# Patient Record
Sex: FEMALE | Race: WHITE | NOT HISPANIC OR LATINO | Employment: OTHER | URBAN - METROPOLITAN AREA
[De-identification: names, ages, dates, MRNs, and addresses within clinical notes are randomized per-mention and may not be internally consistent; named-entity substitution may affect disease eponyms.]

---

## 2017-01-27 ENCOUNTER — TRANSCRIBE ORDERS (OUTPATIENT)
Dept: LAB | Facility: CLINIC | Age: 82
End: 2017-01-27

## 2017-01-27 ENCOUNTER — APPOINTMENT (OUTPATIENT)
Dept: LAB | Facility: CLINIC | Age: 82
End: 2017-01-27
Payer: COMMERCIAL

## 2017-01-27 DIAGNOSIS — D64.9 ANEMIA, UNSPECIFIED: ICD-10-CM

## 2017-01-27 DIAGNOSIS — Z79.899 ENCOUNTER FOR LONG-TERM (CURRENT) USE OF OTHER MEDICATIONS: ICD-10-CM

## 2017-01-27 DIAGNOSIS — R10.9 ABDOMINAL PAIN, UNSPECIFIED SITE: Primary | ICD-10-CM

## 2017-01-27 LAB
ALBUMIN SERPL BCP-MCNC: 3.5 G/DL (ref 3.5–5)
ALP SERPL-CCNC: 69 U/L (ref 46–116)
ALT SERPL W P-5'-P-CCNC: 20 U/L (ref 12–78)
ANION GAP SERPL CALCULATED.3IONS-SCNC: 6 MMOL/L (ref 4–13)
AST SERPL W P-5'-P-CCNC: 12 U/L (ref 5–45)
BASOPHILS # BLD AUTO: 0.02 THOUSANDS/ΜL (ref 0–0.1)
BASOPHILS NFR BLD AUTO: 0 % (ref 0–1)
BILIRUB SERPL-MCNC: 0.44 MG/DL (ref 0.2–1)
BUN SERPL-MCNC: 11 MG/DL (ref 5–25)
CALCIUM SERPL-MCNC: 8.9 MG/DL (ref 8.3–10.1)
CHLORIDE SERPL-SCNC: 106 MMOL/L (ref 100–108)
CO2 SERPL-SCNC: 29 MMOL/L (ref 21–32)
CREAT SERPL-MCNC: 0.8 MG/DL (ref 0.6–1.3)
EOSINOPHIL # BLD AUTO: 0.11 THOUSAND/ΜL (ref 0–0.61)
EOSINOPHIL NFR BLD AUTO: 2 % (ref 0–6)
ERYTHROCYTE [DISTWIDTH] IN BLOOD BY AUTOMATED COUNT: 16.5 % (ref 11.6–15.1)
EST. AVERAGE GLUCOSE BLD GHB EST-MCNC: 120 MG/DL
FERRITIN SERPL-MCNC: 12 NG/ML (ref 8–388)
FOLATE SERPL-MCNC: >20 NG/ML (ref 3.1–17.5)
GFR SERPL CREATININE-BSD FRML MDRD: >60 ML/MIN/1.73SQ M
GLUCOSE SERPL-MCNC: 95 MG/DL (ref 65–140)
HBA1C MFR BLD: 5.8 % (ref 4.2–6.3)
HCT VFR BLD AUTO: 39.6 % (ref 34.8–46.1)
HGB BLD-MCNC: 12.4 G/DL (ref 11.5–15.4)
IRON SATN MFR SERPL: 13 %
IRON SERPL-MCNC: 52 UG/DL (ref 50–170)
LYMPHOCYTES # BLD AUTO: 2.25 THOUSANDS/ΜL (ref 0.6–4.47)
LYMPHOCYTES NFR BLD AUTO: 33 % (ref 14–44)
MCH RBC QN AUTO: 27.4 PG (ref 26.8–34.3)
MCHC RBC AUTO-ENTMCNC: 31.3 G/DL (ref 31.4–37.4)
MCV RBC AUTO: 88 FL (ref 82–98)
MONOCYTES # BLD AUTO: 0.53 THOUSAND/ΜL (ref 0.17–1.22)
MONOCYTES NFR BLD AUTO: 8 % (ref 4–12)
NEUTROPHILS # BLD AUTO: 3.91 THOUSANDS/ΜL (ref 1.85–7.62)
NEUTS SEG NFR BLD AUTO: 57 % (ref 43–75)
NRBC BLD AUTO-RTO: 0 /100 WBCS
PLATELET # BLD AUTO: 227 THOUSANDS/UL (ref 149–390)
PMV BLD AUTO: 10.4 FL (ref 8.9–12.7)
POTASSIUM SERPL-SCNC: 3.3 MMOL/L (ref 3.5–5.3)
PROT SERPL-MCNC: 7.2 G/DL (ref 6.4–8.2)
RBC # BLD AUTO: 4.52 MILLION/UL (ref 3.81–5.12)
SODIUM SERPL-SCNC: 141 MMOL/L (ref 136–145)
TIBC SERPL-MCNC: 411 UG/DL (ref 250–450)
VIT B12 SERPL-MCNC: 695 PG/ML (ref 100–900)
WBC # BLD AUTO: 6.83 THOUSAND/UL (ref 4.31–10.16)

## 2017-01-27 PROCEDURE — 83036 HEMOGLOBIN GLYCOSYLATED A1C: CPT

## 2017-01-27 PROCEDURE — 83540 ASSAY OF IRON: CPT

## 2017-01-27 PROCEDURE — 82607 VITAMIN B-12: CPT

## 2017-01-27 PROCEDURE — 82746 ASSAY OF FOLIC ACID SERUM: CPT

## 2017-01-27 PROCEDURE — 80053 COMPREHEN METABOLIC PANEL: CPT

## 2017-01-27 PROCEDURE — 85025 COMPLETE CBC W/AUTO DIFF WBC: CPT

## 2017-01-27 PROCEDURE — 83550 IRON BINDING TEST: CPT

## 2017-01-27 PROCEDURE — 36415 COLL VENOUS BLD VENIPUNCTURE: CPT

## 2017-01-27 PROCEDURE — 82728 ASSAY OF FERRITIN: CPT

## 2017-02-23 ENCOUNTER — APPOINTMENT (OUTPATIENT)
Dept: LAB | Facility: CLINIC | Age: 82
End: 2017-02-23
Payer: COMMERCIAL

## 2017-02-23 ENCOUNTER — TRANSCRIBE ORDERS (OUTPATIENT)
Dept: LAB | Facility: CLINIC | Age: 82
End: 2017-02-23

## 2017-02-23 DIAGNOSIS — E87.6 HYPOPOTASSEMIA: Primary | ICD-10-CM

## 2017-02-23 LAB
ANION GAP SERPL CALCULATED.3IONS-SCNC: 5 MMOL/L (ref 4–13)
BUN SERPL-MCNC: 14 MG/DL (ref 5–25)
CALCIUM SERPL-MCNC: 9.1 MG/DL (ref 8.3–10.1)
CHLORIDE SERPL-SCNC: 107 MMOL/L (ref 100–108)
CO2 SERPL-SCNC: 30 MMOL/L (ref 21–32)
CREAT SERPL-MCNC: 0.9 MG/DL (ref 0.6–1.3)
GFR SERPL CREATININE-BSD FRML MDRD: 59 ML/MIN/1.73SQ M
GLUCOSE SERPL-MCNC: 96 MG/DL (ref 65–140)
POTASSIUM SERPL-SCNC: 4 MMOL/L (ref 3.5–5.3)
SODIUM SERPL-SCNC: 142 MMOL/L (ref 136–145)

## 2017-02-23 PROCEDURE — 80048 BASIC METABOLIC PNL TOTAL CA: CPT

## 2017-02-23 PROCEDURE — 36415 COLL VENOUS BLD VENIPUNCTURE: CPT

## 2017-12-04 ENCOUNTER — APPOINTMENT (EMERGENCY)
Dept: RADIOLOGY | Facility: HOSPITAL | Age: 82
End: 2017-12-04
Payer: COMMERCIAL

## 2017-12-04 ENCOUNTER — APPOINTMENT (EMERGENCY)
Dept: CT IMAGING | Facility: HOSPITAL | Age: 82
End: 2017-12-04
Payer: COMMERCIAL

## 2017-12-04 ENCOUNTER — HOSPITAL ENCOUNTER (EMERGENCY)
Facility: HOSPITAL | Age: 82
Discharge: HOME | End: 2017-12-04
Attending: EMERGENCY MEDICINE
Payer: COMMERCIAL

## 2017-12-04 VITALS
SYSTOLIC BLOOD PRESSURE: 186 MMHG | TEMPERATURE: 98.9 F | DIASTOLIC BLOOD PRESSURE: 77 MMHG | RESPIRATION RATE: 20 BRPM | OXYGEN SATURATION: 98 % | HEART RATE: 72 BPM | WEIGHT: 131.1 LBS

## 2017-12-04 DIAGNOSIS — J98.8 VIRAL RESPIRATORY INFECTION: Primary | ICD-10-CM

## 2017-12-04 DIAGNOSIS — R10.9 LEFT LATERAL ABDOMINAL PAIN: ICD-10-CM

## 2017-12-04 DIAGNOSIS — B97.89 VIRAL RESPIRATORY INFECTION: Primary | ICD-10-CM

## 2017-12-04 LAB
ALBUMIN SERPL BCP-MCNC: 3.3 G/DL (ref 3.5–5)
ALP SERPL-CCNC: 71 U/L (ref 46–116)
ALT SERPL W P-5'-P-CCNC: 26 U/L (ref 12–78)
ANION GAP SERPL CALCULATED.3IONS-SCNC: 9 MMOL/L (ref 4–13)
AST SERPL W P-5'-P-CCNC: 18 U/L (ref 5–45)
ATRIAL RATE: 66 BPM
BASOPHILS # BLD AUTO: 0.03 THOUSANDS/ΜL (ref 0–0.1)
BASOPHILS NFR BLD AUTO: 1 % (ref 0–1)
BILIRUB SERPL-MCNC: 0.7 MG/DL (ref 0.2–1)
BUN SERPL-MCNC: 16 MG/DL (ref 5–25)
CALCIUM SERPL-MCNC: 8.8 MG/DL (ref 8.3–10.1)
CHLORIDE SERPL-SCNC: 105 MMOL/L (ref 100–108)
CLARITY, POC: CLEAR
CO2 SERPL-SCNC: 28 MMOL/L (ref 21–32)
COLOR, POC: YELLOW
CREAT SERPL-MCNC: 0.97 MG/DL (ref 0.6–1.3)
EOSINOPHIL # BLD AUTO: 0.12 THOUSAND/ΜL (ref 0–0.61)
EOSINOPHIL NFR BLD AUTO: 2 % (ref 0–6)
ERYTHROCYTE [DISTWIDTH] IN BLOOD BY AUTOMATED COUNT: 13.1 % (ref 11.6–15.1)
EXT BILIRUBIN, UA: NORMAL
EXT BLOOD URINE: NORMAL
EXT GLUCOSE, UA: NORMAL
EXT KETONES: NORMAL
EXT NITRITE, UA: POSITIVE
EXT PH, UA: 6
EXT PROTEIN, UA: NORMAL
EXT SPECIFIC GRAVITY, UA: 1
EXT UROBILINOGEN: 0.2
GFR SERPL CREATININE-BSD FRML MDRD: 52 ML/MIN/1.73SQ M
GLUCOSE SERPL-MCNC: 95 MG/DL (ref 65–140)
HCT VFR BLD AUTO: 38.7 % (ref 34.8–46.1)
HGB BLD-MCNC: 12.5 G/DL (ref 11.5–15.4)
LIPASE SERPL-CCNC: 57 U/L (ref 73–393)
LYMPHOCYTES # BLD AUTO: 1.2 THOUSANDS/ΜL (ref 0.6–4.47)
LYMPHOCYTES NFR BLD AUTO: 18 % (ref 14–44)
MCH RBC QN AUTO: 30.4 PG (ref 26.8–34.3)
MCHC RBC AUTO-ENTMCNC: 32.3 G/DL (ref 31.4–37.4)
MCV RBC AUTO: 94 FL (ref 82–98)
MONOCYTES # BLD AUTO: 0.46 THOUSAND/ΜL (ref 0.17–1.22)
MONOCYTES NFR BLD AUTO: 7 % (ref 4–12)
NEUTROPHILS # BLD AUTO: 4.74 THOUSANDS/ΜL (ref 1.85–7.62)
NEUTS SEG NFR BLD AUTO: 72 % (ref 43–75)
P AXIS: 71 DEGREES
PLATELET # BLD AUTO: 135 THOUSANDS/UL (ref 149–390)
PMV BLD AUTO: 9.9 FL (ref 8.9–12.7)
POTASSIUM SERPL-SCNC: 3.7 MMOL/L (ref 3.5–5.3)
PR INTERVAL: 162 MS
PROT SERPL-MCNC: 6.7 G/DL (ref 6.4–8.2)
QRS AXIS: 7 DEGREES
QRSD INTERVAL: 74 MS
QT INTERVAL: 400 MS
QTC INTERVAL: 413 MS
RBC # BLD AUTO: 4.11 MILLION/UL (ref 3.81–5.12)
SODIUM SERPL-SCNC: 142 MMOL/L (ref 136–145)
T WAVE AXIS: 76 DEGREES
VENTRICULAR RATE: 64 BPM
WBC # BLD AUTO: 6.55 THOUSAND/UL (ref 4.31–10.16)
WBC # BLD EST: NORMAL 10*3/UL

## 2017-12-04 PROCEDURE — 93005 ELECTROCARDIOGRAM TRACING: CPT

## 2017-12-04 PROCEDURE — 71010 HB CHEST X-RAY 1 VIEW FRONTAL (PORTABLE): CPT

## 2017-12-04 PROCEDURE — 96361 HYDRATE IV INFUSION ADD-ON: CPT

## 2017-12-04 PROCEDURE — 99285 EMERGENCY DEPT VISIT HI MDM: CPT

## 2017-12-04 PROCEDURE — 74176 CT ABD & PELVIS W/O CONTRAST: CPT

## 2017-12-04 PROCEDURE — 83690 ASSAY OF LIPASE: CPT | Performed by: EMERGENCY MEDICINE

## 2017-12-04 PROCEDURE — 93005 ELECTROCARDIOGRAM TRACING: CPT | Performed by: EMERGENCY MEDICINE

## 2017-12-04 PROCEDURE — 96360 HYDRATION IV INFUSION INIT: CPT

## 2017-12-04 PROCEDURE — 81002 URINALYSIS NONAUTO W/O SCOPE: CPT | Performed by: EMERGENCY MEDICINE

## 2017-12-04 PROCEDURE — 85025 COMPLETE CBC W/AUTO DIFF WBC: CPT | Performed by: EMERGENCY MEDICINE

## 2017-12-04 PROCEDURE — 80053 COMPREHEN METABOLIC PANEL: CPT | Performed by: EMERGENCY MEDICINE

## 2017-12-04 PROCEDURE — 36415 COLL VENOUS BLD VENIPUNCTURE: CPT | Performed by: EMERGENCY MEDICINE

## 2017-12-04 RX ORDER — IRON POLYSACCHARIDE COMPLEX 150 MG
150 CAPSULE ORAL 2 TIMES DAILY
COMMUNITY
End: 2021-01-01

## 2017-12-04 RX ORDER — OXYCODONE HYDROCHLORIDE 5 MG/1
5 CAPSULE ORAL EVERY 6 HOURS PRN
Qty: 20 CAPSULE | Refills: 0 | Status: SHIPPED | OUTPATIENT
Start: 2017-12-04 | End: 2019-10-30

## 2017-12-04 RX ORDER — ALBUTEROL SULFATE 90 UG/1
2 AEROSOL, METERED RESPIRATORY (INHALATION) EVERY 6 HOURS PRN
COMMUNITY
End: 2020-08-26

## 2017-12-04 RX ORDER — GABAPENTIN 100 MG/1
100 CAPSULE ORAL 4 TIMES DAILY
COMMUNITY
End: 2020-06-07

## 2017-12-04 RX ORDER — OXYCODONE HYDROCHLORIDE 5 MG/1
5 TABLET ORAL ONCE
Status: COMPLETED | OUTPATIENT
Start: 2017-12-04 | End: 2017-12-04

## 2017-12-04 RX ADMIN — SODIUM CHLORIDE 500 ML: 0.9 INJECTION, SOLUTION INTRAVENOUS at 14:43

## 2017-12-04 RX ADMIN — OXYCODONE HYDROCHLORIDE 5 MG: 5 TABLET ORAL at 17:37

## 2017-12-04 NOTE — DISCHARGE INSTRUCTIONS
Diagnosis;   Viral respiratory infection // left mid to lower abdominal pain     - activity as tolerated    - diet as tolerated - need to keep well hydrated    - for the cold symptoms=- just supportive care -- keep hydrated- for cough can use over the counter mucinex- if it helps you- for nasal congestion over the counter afrin 2 sprays per nostril 2 times per day for 3 days    - please return to  The er for any increasing difficulty breathing    - for the left sided abdominal pain // - use over the counter tylenol 500 mg every 4 hrs -- can use the oxycodone as needed     - please return to  The er for any fevers-temp > 100 4/ any worsening/ intractable abdominal pain // any vomiting/ any bloody stools or any new/ worsening/concerning symptoms to you

## 2017-12-04 NOTE — ED NOTES
Pt awake and alert, no distress noted, no other questions upon d/c     April KYUNG Miles RN  12/04/17 1800

## 2017-12-04 NOTE — ED NOTES
Upon going in to discharge pt family and patient reported her pain coming back "severely"   Spoke with Dr Mohinder Vasquez who stated "I will be in to see her"     Bridgett Delacruz RN  12/04/17 0251

## 2017-12-04 NOTE — ED PROCEDURE NOTE
PROCEDURE  ECG 12 Lead Documentation  Date/Time: 12/4/2017 4:35 PM  Performed by: Luis Felipe Bess  Authorized by: Shivani VENCES     Indications / Diagnosis:  Mid abd pain   ECG reviewed by me, the ED Provider: yes    Patient location:  ED and bedside  Previous ECG:     Previous ECG:  Compared to current    Comparison ECG info:  9/20/16    Similarity:  No change  Interpretation:     Interpretation: non-specific    Rate:     ECG rate:  64    ECG rate assessment: normal    Rhythm:     Rhythm: sinus rhythm    Ectopy:     Ectopy: none    QRS:     QRS axis:  Normal    QRS intervals:  Normal  Conduction:     Conduction: normal    ST segments:     ST segments:  Normal  T waves:     T waves: flattening      Flattening:  AVL and V1  Q waves:     Q waves:  V1  Other findings:     Other findings: poor R wave progression and U wave    Comments:      No ecg signs of ischemia/ injury

## 2017-12-04 NOTE — ED PROVIDER NOTES
History  Chief Complaint   Patient presents with    Abdominal Pain     pt here for left side abd pain  hx of bowel loop for many years  afraid it may be the reason of her pain  c/o some diarrhea  onset yesterday  also has an URI  80 yr  wf- with 1 week of improving nasal cogngestion- productive cough -- no fever/ no cp/sob--  No ill cotnacts-  With 2 days of intermitetn sharp left sdied mid abd pain -- worse upon movements-- no injury - no rash in area-- no n/v/d- no gu/gyn comps-        History provided by:  Patient, relative and spouse   used: No        Prior to Admission Medications   Prescriptions Last Dose Informant Patient Reported? Taking? albuterol (PROVENTIL HFA,VENTOLIN HFA) 90 mcg/act inhaler  Self Yes Yes   Sig: Inhale 2 puffs every 6 (six) hours as needed for wheezing   colchicine (COLCRYS) 0 6 mg tablet  Self Yes Yes   Sig: Take 0 6 mg by mouth daily  cyanocobalamin (VITAMIN B-12) 1,000 mcg tablet  Self Yes Yes   Sig: Take 1,000 mcg by mouth daily  dexlansoprazole (DEXILANT) 60 MG capsule  Self Yes Yes   Sig: Take 60 mg by mouth daily  famotidine (PEPCID) 20 mg tablet  Self No Yes   Sig: Take 1 tablet by mouth 2 (two) times a day for 4 days  Patient taking differently: Take 20 mg by mouth daily     gabapentin (NEURONTIN) 100 mg capsule  Self Yes Yes   Sig: Take 100 mg by mouth 4 (four) times a day   iron polysaccharides (NIFEREX) 150 mg capsule  Self Yes Yes   Sig: Take 150 mg by mouth 2 (two) times a day   montelukast (SINGULAIR) 10 mg tablet 12/3/2017 at 1200 Self Yes Yes   Sig: Take 10 mg by mouth daily     oxyCODONE-acetaminophen (PERCOCET) 5-325 mg per tablet  Self Yes Yes   Sig: Take 1 tablet by mouth every 6 (six) hours as needed for moderate pain  pravastatin (PRAVACHOL) 10 mg tablet  Self Yes Yes   Sig: Take 10 mg by mouth daily        Facility-Administered Medications: None       Past Medical History:   Diagnosis Date    Asthma     Cancer ([facility] [address] )  Myocardial infarction        Past Surgical History:   Procedure Laterality Date    HYSTERECTOMY         History reviewed  No pertinent family history  I have reviewed and agree with the history as documented  Social History   Substance Use Topics    Smoking status: Never Smoker    Smokeless tobacco: Not on file    Alcohol use No        Review of Systems   Constitutional: Negative  HENT: Positive for congestion, postnasal drip, rhinorrhea and sore throat  Negative for dental problem, drooling, ear discharge, ear pain, facial swelling, hearing loss, mouth sores, nosebleeds, sinus pain, sinus pressure, sneezing, tinnitus, trouble swallowing and voice change  Eyes: Negative  Respiratory: Positive for cough  Negative for apnea, choking, chest tightness, shortness of breath, wheezing and stridor  Cardiovascular: Negative  Gastrointestinal: Positive for abdominal pain  Negative for abdominal distention, anal bleeding, blood in stool, constipation, diarrhea, nausea, rectal pain and vomiting  Endocrine: Negative  Musculoskeletal: Negative  Skin: Negative  Allergic/Immunologic: Negative  Neurological: Negative  Hematological: Negative  Psychiatric/Behavioral: Negative  Physical Exam  ED Triage Vitals [12/04/17 1200]   Temperature Pulse Respirations Blood Pressure SpO2   98 9 °F (37 2 °C) 75 20 163/69 95 %      Temp Source Heart Rate Source Patient Position - Orthostatic VS BP Location FiO2 (%)   Oral Monitor Sitting Left arm --      Pain Score       Worst Possible Pain           Orthostatic Vital Signs  Vitals:    12/04/17 1430 12/04/17 1512 12/04/17 1515 12/04/17 1621   BP:  127/80 127/80 157/71   Pulse: 68 65 66 65   Patient Position - Orthostatic VS:  Lying Lying Lying       Physical Exam   Constitutional: She is oriented to person, place, and time  No distress     avss-- pulse ox 98 % on ra- intepretation is normal- no intervention is normal- - no intervention   HENT: Head: Normocephalic and atraumatic  Right Ear: External ear normal    Left Ear: External ear normal    Mouth/Throat: Oropharynx is clear and moist  No oropharyngeal exudate  bilaterla ansal cognestion --  No bialterla amx/frtonal sinus tendenress/edema/ erythema   Eyes: Conjunctivae and EOM are normal  Pupils are equal, round, and reactive to light  Right eye exhibits no discharge  Left eye exhibits no discharge  No scleral icterus  Mm pink   Neck: Normal range of motion  Neck supple  No JVD present  No tracheal deviation present  No thyromegaly present  Cardiovascular: Normal rate and intact distal pulses  Exam reveals no gallop and no friction rub  Murmur heard  Pulmonary/Chest: Effort normal and breath sounds normal  No stridor  No respiratory distress  She has no wheezes  She has no rales  She exhibits no tenderness  Abdominal: Soft  Bowel sounds are normal  She exhibits no distension and no mass  There is tenderness  There is no rebound and no guarding  No hernia  No pulsatile abd mass/bruit--  No cva tendernjess-- mild llq  tenderness-- - no peritoneal signs-- no bilateral inguinal femotal hernia's    Musculoskeletal: She exhibits edema  She exhibits no tenderness or deformity  Trace ble pretibial edema- no assym/ erythema/ tenderness-- equal bilateral radial/dp pulses   Lymphadenopathy:     She has no cervical adenopathy  Neurological: She is alert and oriented to person, place, and time  No cranial nerve deficit or sensory deficit  She exhibits normal muscle tone  Skin: Capillary refill takes less than 2 seconds  No rash noted  She is not diaphoretic  No erythema  No pallor  Psychiatric: She has a normal mood and affect  Her behavior is normal  Judgment and thought content normal    Nursing note and vitals reviewed        ED Medications  Medications   sodium chloride 0 9 % bolus 500 mL (500 mL Intravenous New Bag 12/4/17 3517)       Diagnostic Studies  Results Reviewed     Procedure Component Value Units Date/Time    Comprehensive metabolic panel [[id number]]  (Abnormal) Collected:  12/04/17 1439    Lab Status:  Final result Specimen:  Blood from Arm, Right Updated:  12/04/17 1501     Sodium 142 mmol/L      Potassium 3 7 mmol/L      Chloride 105 mmol/L      CO2 28 mmol/L      Anion Gap 9 mmol/L      BUN 16 mg/dL      Creatinine 0 97 mg/dL      Glucose 95 mg/dL      Calcium 8 8 mg/dL      AST 18 U/L      ALT 26 U/L      Alkaline Phosphatase 71 U/L      Total Protein 6 7 g/dL      Albumin 3 3 (L) g/dL      Total Bilirubin 0 70 mg/dL      eGFR 52 ml/min/1 73sq m     Narrative:         National Kidney Disease Education Program recommendations are as follows:  GFR calculation is accurate only with a steady state creatinine  Chronic Kidney disease less than 60 ml/min/1 73 sq  meters  Kidney failure less than 15 ml/min/1 73 sq  meters      Lipase [[id number]]  (Abnormal) Collected:  12/04/17 1439    Lab Status:  Final result Specimen:  Blood from Arm, Right Updated:  12/04/17 1501     Lipase 57 (L) u/L     POCT urinalysis dipstick [[id number]]  (Normal) Resulted:  12/04/17 1456    Lab Status:  Final result Specimen:  Urine Updated:  12/04/17 1457     Color, UA yellow     Clarity, UA clear     EXT Glucose, UA neg     EXT Bilirubin, UA (Ref: Negative) neg     EXT Ketones, UA (Ref: Negative) small     EXT Spec Grav, UA 1 005     EXT Blood, UA (Ref: Negative) hemolyzed trace     EXT pH, UA 6 0     EXT Protein, UA (Ref: Negative) neg     EXT Urobilinogen, UA (Ref: 0 2- 1 0) 0 2     EXT Leukocytes, UA (Ref: Negative) trace     EXT Nitrite, UA (Ref: Negative) positive    CBC and differential [[id number]]  (Abnormal) Collected:  12/04/17 1439    Lab Status:  Final result Specimen:  Blood from Arm, Right Updated:  12/04/17 1446     WBC 6 55 Thousand/uL      RBC 4 11 Million/uL      Hemoglobin 12 5 g/dL      Hematocrit 38 7 %      MCV 94 fL      MCH 30 4 pg      MCHC 32 3 g/dL      RDW 13 1 %      MPV 9 9 fL Platelets 083 (L) Thousands/uL      Neutrophils Relative 72 %      Lymphocytes Relative 18 %      Monocytes Relative 7 %      Eosinophils Relative 2 %      Basophils Relative 1 %      Neutrophils Absolute 4 74 Thousands/µL      Lymphocytes Absolute 1 20 Thousands/µL      Monocytes Absolute 0 46 Thousand/µL      Eosinophils Absolute 0 12 Thousand/µL      Basophils Absolute 0 03 Thousands/µL                  XR chest 1 view portable   ED Interpretation by Diana Aguilera MD (12/04 1633)   No change from prior       Final Result by Shannan Howard DO (12/04 1613)      No active pulmonary disease  Workstation performed: [id number]         CT abdomen pelvis wo contrast   Final Result by Catarina Osuna MD (12/04 1535)      No acute inflammatory findings within the abdomen or pelvis  Workstation performed: [id number]                    Procedures  Procedures       Phone Contacts  ED Phone Contact    ED Course  ED Course as of Dec 04 1734   Mon Dec 04, 2017   1421 Er md note- pt offered pain medication  - refuses at present    1631 Cxr portable- no sign change from previous 9/20/16-  no change in mediastinum- no free/sq air/ no infiltrate/ ptx/ pulm edema/ plerual effusions    1645 - er- md note- pt currently denies any urinary comps karan l d/c - pt and family feel comfortable taking pt home    65 - er md note- upon pt d/c--  pt again c/o intermitent left sided sharp abd pain -- pt offered hops admit- pt refuses admit- asking for pain medication- has oxycodone  for pain at home- but only has 1 pill left                                [facility]  CritCare Time    Disposition  Final diagnoses:   None     ED Disposition     None      Follow-up Information    None       Patient's Medications   Discharge Prescriptions    No medications on file     No discharge procedures on file      ED Provider  Electronically Signed by           Diana Aguilera MD  12/04/17 0566

## 2017-12-13 ENCOUNTER — APPOINTMENT (EMERGENCY)
Dept: RADIOLOGY | Facility: HOSPITAL | Age: 82
DRG: 690 | End: 2017-12-13
Payer: COMMERCIAL

## 2017-12-13 ENCOUNTER — HOSPITAL ENCOUNTER (INPATIENT)
Facility: HOSPITAL | Age: 82
LOS: 3 days | Discharge: HOME | DRG: 690 | End: 2017-12-16
Attending: EMERGENCY MEDICINE | Admitting: FAMILY MEDICINE
Payer: COMMERCIAL

## 2017-12-13 DIAGNOSIS — R53.1 GENERALIZED WEAKNESS: ICD-10-CM

## 2017-12-13 DIAGNOSIS — N39.0 UTI (URINARY TRACT INFECTION): Primary | ICD-10-CM

## 2017-12-13 DIAGNOSIS — R05.9 COUGH: ICD-10-CM

## 2017-12-13 DIAGNOSIS — F32.A DEPRESSION: ICD-10-CM

## 2017-12-13 PROBLEM — G62.9 NEUROPATHY: Status: ACTIVE | Noted: 2017-12-13

## 2017-12-13 PROBLEM — M10.9 GOUT: Status: ACTIVE | Noted: 2017-12-13

## 2017-12-13 PROBLEM — I10 ESSENTIAL HYPERTENSION: Status: ACTIVE | Noted: 2017-12-13

## 2017-12-13 PROBLEM — J45.909 ASTHMA: Status: ACTIVE | Noted: 2017-12-13

## 2017-12-13 PROBLEM — N30.00 ACUTE CYSTITIS: Status: ACTIVE | Noted: 2017-12-13

## 2017-12-13 LAB
ALBUMIN SERPL BCP-MCNC: 3.1 G/DL (ref 3.5–5)
ALP SERPL-CCNC: 92 U/L (ref 46–116)
ALT SERPL W P-5'-P-CCNC: 19 U/L (ref 12–78)
ANION GAP SERPL CALCULATED.3IONS-SCNC: 9 MMOL/L (ref 4–13)
AST SERPL W P-5'-P-CCNC: 16 U/L (ref 5–45)
ATRIAL RATE: 73 BPM
BACTERIA UR QL AUTO: ABNORMAL /HPF
BASOPHILS # BLD AUTO: 0.03 THOUSANDS/ΜL (ref 0–0.1)
BASOPHILS NFR BLD AUTO: 0 % (ref 0–1)
BILIRUB SERPL-MCNC: 0.7 MG/DL (ref 0.2–1)
BILIRUB UR QL STRIP: NEGATIVE
BUN SERPL-MCNC: 10 MG/DL (ref 5–25)
CALCIUM SERPL-MCNC: 9.3 MG/DL (ref 8.3–10.1)
CHLORIDE SERPL-SCNC: 101 MMOL/L (ref 100–108)
CLARITY UR: ABNORMAL
CO2 SERPL-SCNC: 26 MMOL/L (ref 21–32)
COLOR UR: YELLOW
CREAT SERPL-MCNC: 0.89 MG/DL (ref 0.6–1.3)
EOSINOPHIL # BLD AUTO: 0.12 THOUSAND/ΜL (ref 0–0.61)
EOSINOPHIL NFR BLD AUTO: 1 % (ref 0–6)
ERYTHROCYTE [DISTWIDTH] IN BLOOD BY AUTOMATED COUNT: 12.9 % (ref 11.6–15.1)
GFR SERPL CREATININE-BSD FRML MDRD: 57 ML/MIN/1.73SQ M
GLUCOSE SERPL-MCNC: 116 MG/DL (ref 65–140)
GLUCOSE UR STRIP-MCNC: NEGATIVE MG/DL
HCT VFR BLD AUTO: 41.2 % (ref 34.8–46.1)
HGB BLD-MCNC: 13.2 G/DL (ref 11.5–15.4)
HGB UR QL STRIP.AUTO: NEGATIVE
KETONES UR STRIP-MCNC: ABNORMAL MG/DL
LACTATE SERPL-SCNC: 0.8 MMOL/L (ref 0.5–2)
LEUKOCYTE ESTERASE UR QL STRIP: ABNORMAL
LYMPHOCYTES # BLD AUTO: 0.92 THOUSANDS/ΜL (ref 0.6–4.47)
LYMPHOCYTES NFR BLD AUTO: 8 % (ref 14–44)
MCH RBC QN AUTO: 30.1 PG (ref 26.8–34.3)
MCHC RBC AUTO-ENTMCNC: 32 G/DL (ref 31.4–37.4)
MCV RBC AUTO: 94 FL (ref 82–98)
MONOCYTES # BLD AUTO: 0.8 THOUSAND/ΜL (ref 0.17–1.22)
MONOCYTES NFR BLD AUTO: 7 % (ref 4–12)
NEUTROPHILS # BLD AUTO: 9.46 THOUSANDS/ΜL (ref 1.85–7.62)
NEUTS SEG NFR BLD AUTO: 84 % (ref 43–75)
NITRITE UR QL STRIP: POSITIVE
NON-SQ EPI CELLS URNS QL MICRO: ABNORMAL /HPF
P AXIS: 70 DEGREES
PH UR STRIP.AUTO: 5.5 [PH] (ref 4.5–8)
PLATELET # BLD AUTO: 244 THOUSANDS/UL (ref 149–390)
PMV BLD AUTO: 10.5 FL (ref 8.9–12.7)
POTASSIUM SERPL-SCNC: 3.8 MMOL/L (ref 3.5–5.3)
PR INTERVAL: 160 MS
PROT SERPL-MCNC: 7.8 G/DL (ref 6.4–8.2)
PROT UR STRIP-MCNC: NEGATIVE MG/DL
QRS AXIS: -12 DEGREES
QRSD INTERVAL: 70 MS
QT INTERVAL: 368 MS
QTC INTERVAL: 405 MS
RBC # BLD AUTO: 4.39 MILLION/UL (ref 3.81–5.12)
RBC #/AREA URNS AUTO: ABNORMAL /HPF
SODIUM SERPL-SCNC: 136 MMOL/L (ref 136–145)
SP GR UR STRIP.AUTO: 1.02 (ref 1–1.03)
T WAVE AXIS: 52 DEGREES
TROPONIN I SERPL-MCNC: <0.02 NG/ML
UROBILINOGEN UR QL STRIP.AUTO: 0.2 E.U./DL
VENTRICULAR RATE: 73 BPM
WBC # BLD AUTO: 11.33 THOUSAND/UL (ref 4.31–10.16)
WBC #/AREA URNS AUTO: ABNORMAL /HPF

## 2017-12-13 PROCEDURE — 36415 COLL VENOUS BLD VENIPUNCTURE: CPT | Performed by: EMERGENCY MEDICINE

## 2017-12-13 PROCEDURE — 80053 COMPREHEN METABOLIC PANEL: CPT | Performed by: EMERGENCY MEDICINE

## 2017-12-13 PROCEDURE — 84484 ASSAY OF TROPONIN QUANT: CPT | Performed by: EMERGENCY MEDICINE

## 2017-12-13 PROCEDURE — 96361 HYDRATE IV INFUSION ADD-ON: CPT

## 2017-12-13 PROCEDURE — 96360 HYDRATION IV INFUSION INIT: CPT

## 2017-12-13 PROCEDURE — 87040 BLOOD CULTURE FOR BACTERIA: CPT | Performed by: EMERGENCY MEDICINE

## 2017-12-13 PROCEDURE — 81001 URINALYSIS AUTO W/SCOPE: CPT | Performed by: EMERGENCY MEDICINE

## 2017-12-13 PROCEDURE — 85025 COMPLETE CBC W/AUTO DIFF WBC: CPT | Performed by: EMERGENCY MEDICINE

## 2017-12-13 PROCEDURE — 94640 AIRWAY INHALATION TREATMENT: CPT

## 2017-12-13 PROCEDURE — 87798 DETECT AGENT NOS DNA AMP: CPT | Performed by: EMERGENCY MEDICINE

## 2017-12-13 PROCEDURE — 83605 ASSAY OF LACTIC ACID: CPT | Performed by: EMERGENCY MEDICINE

## 2017-12-13 PROCEDURE — 71020 HB CHEST X-RAY 2VW FRONTAL&LATL: CPT

## 2017-12-13 PROCEDURE — 99285 EMERGENCY DEPT VISIT HI MDM: CPT

## 2017-12-13 PROCEDURE — 93005 ELECTROCARDIOGRAM TRACING: CPT | Performed by: EMERGENCY MEDICINE

## 2017-12-13 RX ORDER — OXYCODONE HYDROCHLORIDE AND ACETAMINOPHEN 5; 325 MG/1; MG/1
1 TABLET ORAL EVERY 6 HOURS PRN
Status: DISCONTINUED | OUTPATIENT
Start: 2017-12-13 | End: 2017-12-16 | Stop reason: HOSPADM

## 2017-12-13 RX ORDER — GABAPENTIN 100 MG/1
100 CAPSULE ORAL ONCE
Status: COMPLETED | OUTPATIENT
Start: 2017-12-13 | End: 2017-12-13

## 2017-12-13 RX ORDER — GUAIFENESIN/DEXTROMETHORPHAN 100-10MG/5
10 SYRUP ORAL EVERY 4 HOURS PRN
Status: DISCONTINUED | OUTPATIENT
Start: 2017-12-13 | End: 2017-12-16 | Stop reason: HOSPADM

## 2017-12-13 RX ORDER — MONTELUKAST SODIUM 10 MG/1
10 TABLET ORAL DAILY
Status: DISCONTINUED | OUTPATIENT
Start: 2017-12-14 | End: 2017-12-16 | Stop reason: HOSPADM

## 2017-12-13 RX ORDER — OXYCODONE HYDROCHLORIDE 5 MG/1
5 TABLET ORAL EVERY 6 HOURS PRN
Status: DISCONTINUED | OUTPATIENT
Start: 2017-12-13 | End: 2017-12-16 | Stop reason: HOSPADM

## 2017-12-13 RX ORDER — IPRATROPIUM BROMIDE AND ALBUTEROL SULFATE 2.5; .5 MG/3ML; MG/3ML
3 SOLUTION RESPIRATORY (INHALATION) ONCE
Status: COMPLETED | OUTPATIENT
Start: 2017-12-13 | End: 2017-12-13

## 2017-12-13 RX ORDER — DONEPEZIL HYDROCHLORIDE 5 MG/1
1 TABLET, FILM COATED ORAL DAILY
COMMUNITY
Start: 2013-03-27 | End: 2021-01-01

## 2017-12-13 RX ORDER — CHOLECALCIFEROL (VITAMIN D3) 125 MCG
1000 CAPSULE ORAL DAILY
Status: DISCONTINUED | OUTPATIENT
Start: 2017-12-14 | End: 2017-12-16 | Stop reason: HOSPADM

## 2017-12-13 RX ORDER — PRAVASTATIN SODIUM 10 MG
10 TABLET ORAL DAILY
Status: DISCONTINUED | OUTPATIENT
Start: 2017-12-14 | End: 2017-12-16 | Stop reason: HOSPADM

## 2017-12-13 RX ORDER — GABAPENTIN 100 MG/1
100 CAPSULE ORAL 4 TIMES DAILY
Status: DISCONTINUED | OUTPATIENT
Start: 2017-12-13 | End: 2017-12-16 | Stop reason: HOSPADM

## 2017-12-13 RX ORDER — COLCHICINE 0.6 MG/1
0.6 TABLET ORAL ONCE
Status: COMPLETED | OUTPATIENT
Start: 2017-12-13 | End: 2017-12-13

## 2017-12-13 RX ORDER — DONEPEZIL HYDROCHLORIDE 5 MG/1
5 TABLET, FILM COATED ORAL DAILY
Status: DISCONTINUED | OUTPATIENT
Start: 2017-12-14 | End: 2017-12-16 | Stop reason: HOSPADM

## 2017-12-13 RX ORDER — COLCHICINE 0.6 MG/1
0.6 TABLET ORAL DAILY
Status: DISCONTINUED | OUTPATIENT
Start: 2017-12-14 | End: 2017-12-16 | Stop reason: HOSPADM

## 2017-12-13 RX ORDER — PANTOPRAZOLE SODIUM 40 MG/1
40 TABLET, DELAYED RELEASE ORAL
Status: DISCONTINUED | OUTPATIENT
Start: 2017-12-14 | End: 2017-12-16 | Stop reason: HOSPADM

## 2017-12-13 RX ORDER — ALBUTEROL SULFATE 90 UG/1
2 AEROSOL, METERED RESPIRATORY (INHALATION) EVERY 6 HOURS PRN
Status: DISCONTINUED | OUTPATIENT
Start: 2017-12-13 | End: 2017-12-16 | Stop reason: HOSPADM

## 2017-12-13 RX ADMIN — GABAPENTIN 100 MG: 100 CAPSULE ORAL at 21:56

## 2017-12-13 RX ADMIN — CEFTRIAXONE 1000 MG: 1 INJECTION, SOLUTION INTRAVENOUS at 15:25

## 2017-12-13 RX ADMIN — OXYCODONE HYDROCHLORIDE AND ACETAMINOPHEN 1 TABLET: 5; 325 TABLET ORAL at 21:54

## 2017-12-13 RX ADMIN — SODIUM CHLORIDE 1000 ML: 0.9 INJECTION, SOLUTION INTRAVENOUS at 12:39

## 2017-12-13 RX ADMIN — IPRATROPIUM BROMIDE AND ALBUTEROL SULFATE 3 ML: .5; 3 SOLUTION RESPIRATORY (INHALATION) at 12:35

## 2017-12-13 RX ADMIN — COLCHICINE 0.6 MG: 0.6 TABLET, FILM COATED ORAL at 14:26

## 2017-12-13 RX ADMIN — GABAPENTIN 100 MG: 100 CAPSULE ORAL at 18:16

## 2017-12-13 RX ADMIN — GUAIFENESIN AND DEXTROMETHORPHAN 10 ML: 100; 10 SYRUP ORAL at 18:16

## 2017-12-13 RX ADMIN — GABAPENTIN 100 MG: 100 CAPSULE ORAL at 14:26

## 2017-12-13 NOTE — ASSESSMENT & PLAN NOTE
Patient with positive urine plus burning sensation, hematuria, elevated white blood cell count on CBC   Decrease oral intake  IVF  IV antibiotic  Urine culture

## 2017-12-13 NOTE — ED NOTES
Pt requesting colchecine as well  MD made aware  Pt resting quietly in bed    IVF continues to infuse via piv     Kaela Wyatt, MÓNICA  12/13/17 0937

## 2017-12-13 NOTE — ED NOTES
Pt with increased pain  Requesting her daily neurontin  MD notified    Awaiting orders     Bharath Vega RN  12/13/17 2458

## 2017-12-13 NOTE — ED NOTES
Pt asking again for colchecine and gabapentin  States that she is in so much pain that she cannot tolerate the blankets being moved  Daughter at bedside reports that pt has gout/arthritis   States left fingers are turning red and will continue to worsen until pt receives her meds    Dr Shah Sensor informed of same     Karthikeyan Nunez RN  12/13/17 1423

## 2017-12-13 NOTE — ED PROVIDER NOTES
History  Chief Complaint   Patient presents with    Flu Symptoms     body aches, flu like symptoms, cough, poor PO intakes x2-3 wks; pt is weak       History provided by:  Patient and relative  Flu Symptoms   Presenting symptoms: cough, fatigue and myalgias    Presenting symptoms: no fever, no nausea, no shortness of breath, no sore throat and no vomiting    Severity:  Unable to specify  Onset quality:  Gradual  Duration:  2 weeks  Progression:  Waxing and waning  Chronicity:  New  Relieved by:  Nothing  Worsened by:  Nothing  Ineffective treatments: mucinex  Associated symptoms: chills, decreased appetite and decreased physical activity    Associated symptoms: no ear pain, no mental status change, no congestion, no neck stiffness and no syncope    Risk factors: being elderly    Risk factors: no heart disease, no immunocompromised state, no kidney disease, no liver disease, not pregnant and no sick contacts        Prior to Admission Medications   Prescriptions Last Dose Informant Patient Reported? Taking? albuterol (PROVENTIL HFA,VENTOLIN HFA) 90 mcg/act inhaler  Self Yes No   Sig: Inhale 2 puffs every 6 (six) hours as needed for wheezing   colchicine (COLCRYS) 0 6 mg tablet 12/12/2017 at Unknown time Self Yes Yes   Sig: Take 0 6 mg by mouth daily  cyanocobalamin (VITAMIN B-12) 1,000 mcg tablet  Self Yes No   Sig: Take 1,000 mcg by mouth daily  dexlansoprazole (DEXILANT) 60 MG capsule 12/13/2017 at Unknown time Self Yes Yes   Sig: Take 60 mg by mouth daily  donepezil (ARICEPT) 5 mg tablet   Yes Yes   Sig: Take 1 tablet by mouth daily   famotidine (PEPCID) 20 mg tablet  Self No No   Sig: Take 1 tablet by mouth 2 (two) times a day for 4 days     Patient taking differently: Take 20 mg by mouth daily     gabapentin (NEURONTIN) 100 mg capsule 12/13/2017 at Unknown time Self Yes Yes   Sig: Take 100 mg by mouth 4 (four) times a day   iron polysaccharides (NIFEREX) 150 mg capsule  Self Yes No   Sig: Take 150 mg by mouth 2 (two) times a day   montelukast (SINGULAIR) 10 mg tablet 12/13/2017 at Unknown time Self Yes Yes   Sig: Take 10 mg by mouth daily     oxyCODONE (OXY-IR) 5 MG capsule   No No   Sig: Take 1 capsule by mouth every 6 (six) hours as needed for severe pain for up to 20 doses Max Daily Amount: 20 mg   oxyCODONE-acetaminophen (PERCOCET) 5-325 mg per tablet  Self Yes No   Sig: Take 1 tablet by mouth every 6 (six) hours as needed for moderate pain  pravastatin (PRAVACHOL) 10 mg tablet  Self Yes No   Sig: Take 10 mg by mouth daily  Facility-Administered Medications: None       Past Medical History:   Diagnosis Date    Asthma     Cancer ([facility] [address] )     Myocardial infarction        Past Surgical History:   Procedure Laterality Date    APPENDECTOMY      HYSTERECTOMY         History reviewed  No pertinent family history  I have reviewed and agree with the history as documented  Social History   Substance Use Topics    Smoking status: Never Smoker    Smokeless tobacco: Never Used    Alcohol use No        Review of Systems   Constitutional: Positive for chills, decreased appetite and fatigue  Negative for fever  HENT: Negative for congestion, ear pain and sore throat  Eyes: Negative for visual disturbance  Respiratory: Positive for cough  Negative for shortness of breath  Cardiovascular: Negative for chest pain  Gastrointestinal: Negative for abdominal pain, nausea and vomiting  Genitourinary: Negative for difficulty urinating  Musculoskeletal: Positive for myalgias  Negative for neck stiffness  Neurological: Positive for weakness  Psychiatric/Behavioral: Negative for confusion         Physical Exam  ED Triage Vitals [12/13/17 1051]   Temperature Pulse Respirations Blood Pressure SpO2   98 1 °F (36 7 °C) 91 20 162/75 96 %      Temp Source Heart Rate Source Patient Position - Orthostatic VS BP Location FiO2 (%)   Oral -- Lying Right arm --      Pain Score       8           Orthostatic Vital Signs  Vitals:    12/13/17 1051 12/13/17 1315   BP: 162/75    Pulse: 91 82   Patient Position - Orthostatic VS: Lying        Physical Exam   Constitutional: She is oriented to person, place, and time  HENT:   Head: Normocephalic and atraumatic  Eyes: EOM are normal  Pupils are equal, round, and reactive to light  Neck: Normal range of motion  Neck supple  Cardiovascular: Normal rate and regular rhythm  Pulmonary/Chest: Effort normal    Abdominal: Soft  Bowel sounds are normal    Musculoskeletal: Normal range of motion  Left 4th digit, red and swollen, patient states she has gout   Neurological: She is alert and oriented to person, place, and time  Skin: Skin is warm and dry  Psychiatric: She has a normal mood and affect  Nursing note and vitals reviewed  ED Medications  Medications   cefTRIAXone (ROCEPHIN) IVPB (premix) 1,000 mg (not administered)   sodium chloride 0 9 % bolus 1,000 mL (0 mL Intravenous Stopped 12/13/17 1410)   ipratropium-albuterol (DUO-NEB) 0 5-2 5 mg/mL inhalation solution 3 mL (3 mL Nebulization Given 12/13/17 1235)   colchicine (COLCRYS) tablet 0 6 mg (0 6 mg Oral Given 12/13/17 1426)   gabapentin (NEURONTIN) capsule 100 mg (100 mg Oral Given 12/13/17 1426)       Diagnostic Studies  Results Reviewed     Procedure Component Value Units Date/Time    UA w Reflex to Microscopic [[id number]]  (Abnormal) Collected:  12/13/17 1452    Lab Status:  Final result Specimen:  Urine from Urine, Clean Catch Updated:  12/13/17 1500     Color, UA Yellow     Clarity, UA Slightly Cloudy     Specific [employer], UA 1 020     pH, UA 5 5     Leukocytes, UA Small (A)     Nitrite, UA Positive (A)     Protein, UA Negative mg/dl      Glucose, UA Negative mg/dl      Ketones, UA 40 (2+) (A) mg/dl      Urobilinogen, UA 0 2 E U /dl      Bilirubin, UA Negative     Blood, UA Negative    Urine Microscopic [[id number]] Collected:  12/13/17 1452    Lab Status:   In process Specimen:  Urine from Urine, Clean Catch Updated:  12/13/17 1500    Lactic acid, plasma [[id number]]  (Normal) Collected:  12/13/17 1241    Lab Status:  Final result Specimen:  Blood from Arm, Left Updated:  12/13/17 1310     LACTIC ACID 0 8 mmol/L     Narrative:         Result may be elevated if tourniquet was used during collection  Troponin I [[id number]]  (Normal) Collected:  12/13/17 1100    Lab Status:  Final result Specimen:  Blood from Arm, Right Updated:  12/13/17 1258     Troponin I <0 02 ng/mL     Narrative:         [employer] Chemistry analyzer 99% cutoff is > 0 04 ng/mL in network labs    o cTnI 99% cutoff is useful only when applied to patients in the clinical setting of myocardial ischemia  o cTnI 99% cutoff should be interpreted in the context of clinical history, ECG findings and possibly cardiac imaging to establish correct diagnosis  o cTnI 99% cutoff may be suggestive but clearly not indicative of a coronary event without the clinical setting of myocardial ischemia  Comprehensive metabolic panel [[id number]]  (Abnormal) Collected:  12/13/17 1100    Lab Status:  Final result Specimen:  Blood from Arm, Right Updated:  12/13/17 1255     Sodium 136 mmol/L      Potassium 3 8 mmol/L      Chloride 101 mmol/L      CO2 26 mmol/L      Anion Gap 9 mmol/L      BUN 10 mg/dL      Creatinine 0 89 mg/dL      Glucose 116 mg/dL      Calcium 9 3 mg/dL      AST 16 U/L      ALT 19 U/L      Alkaline Phosphatase 92 U/L      Total Protein 7 8 g/dL      Albumin 3 1 (L) g/dL      Total Bilirubin 0 70 mg/dL      eGFR 57 ml/min/1 73sq m     Narrative:         National Kidney Disease Education Program recommendations are as follows:  GFR calculation is accurate only with a steady state creatinine  Chronic Kidney disease less than 60 ml/min/1 73 sq  meters  Kidney failure less than 15 ml/min/1 73 sq  meters      CBC and differential [[id number]]  (Abnormal) Collected:  12/13/17 1100    Lab Status:  Final result Specimen:  Blood from Arm, Right Updated:  12/13/17 1249 WBC 11 33 (H) Thousand/uL      RBC 4 39 Million/uL      Hemoglobin 13 2 g/dL      Hematocrit 41 2 %      MCV 94 fL      MCH 30 1 pg      MCHC 32 0 g/dL      RDW 12 9 %      MPV 10 5 fL      Platelets 226 Thousands/uL      Neutrophils Relative 84 (H) %      Lymphocytes Relative 8 (L) %      Monocytes Relative 7 %      Eosinophils Relative 1 %      Basophils Relative 0 %      Neutrophils Absolute 9 46 (H) Thousands/µL      Lymphocytes Absolute 0 92 Thousands/µL      Monocytes Absolute 0 80 Thousand/µL      Eosinophils Absolute 0 12 Thousand/µL      Basophils Absolute 0 03 Thousands/µL     Influenza A/B and RSV by PCR (indicated for patients >2 mo of age) [[id number]] Collected:  12/13/17 1232    Lab Status: In process Specimen:  Nasopharyngeal from Nasopharyngeal Swab Updated:  12/13/17 1246    Blood culture #1 [[id number]] Collected:  12/13/17 1241    Lab Status: In process Specimen:  Blood from Arm, Left Updated:  12/13/17 1245    Blood culture #2 [[id number]] Collected:  12/13/17 1100    Lab Status: In process Specimen:  Blood from Arm, Right Updated:  12/13/17 1228                 XR chest 2 views   Final Result by Radha Jenkins MD (12/13 4611)      No active pulmonary disease  Workstation performed: [id number]                    Procedures  Procedures       Phone Contacts  ED Phone Contact    ED Course  ED Course                                MDM  Number of Diagnoses or Management Options  Cough: new and requires workup  Generalized weakness: new and requires workup  UTI (urinary tract infection): new and requires workup  Diagnosis management comments: 3:05 PM  Labs unremarkable, as is chest xray, but UA shows UTI  Will page hospitalist for admission  3:19 PM  Discussed with Dr [place]  We had a detailed discussion of the patient's condition and case,  including need for admission  Accepts to his/her service  Bed request/bridging orders placed             Amount and/or Complexity of Data Reviewed  Clinical lab tests: ordered and reviewed  Tests in the radiology section of CPT®: ordered and reviewed  Tests in the medicine section of CPT®: ordered and reviewed  Decide to obtain previous medical records or to obtain history from someone other than the patient: yes  Review and summarize past medical records: yes  Independent visualization of images, tracings, or specimens: yes    Risk of Complications, Morbidity, and/or Mortality  Presenting problems: high  Diagnostic procedures: high  Management options: high    Patient Progress  Patient progress: stable    CritCare Time    Disposition  Final diagnoses:   UTI (urinary tract infection)   Generalized weakness   Cough     Time reflects when diagnosis was documented in both MDM as applicable and the Disposition within this note     Time User Action Codes Description Comment    12/13/2017  3:06 PM Frida Franco Add [N39 0] UTI (urinary tract infection)     12/13/2017  3:06 PM Amy Heredia Add [R53 1] Generalized weakness     12/13/2017  3:06 PM Frida Franco Add [R05] Cough       ED Disposition     ED Disposition Condition Comment    Admit  Case was discussed with HARPER and the patient's admission status was agreed to be Admission Status: inpatient status to the service of Dr Hameed Profit   Follow-up Information    None       Patient's Medications   Discharge Prescriptions    No medications on file     No discharge procedures on file      ED Provider  Electronically Signed by           Vicky Linares DO  12/13/17 7385

## 2017-12-13 NOTE — H&P
H&P- Ty Manrique 7/4/1927, 80 y o  female MRN: [id number]    Unit/Bed#: -01 Encounter: [id number]    Primary Care Provider: Davy Riojas MD   Date and time admitted to hospital: 12/13/2017 10:48 AM        * UTI (urinary tract infection)   Assessment & Plan    Patient with positive urine plus burning sensation, hematuria, elevated white blood cell count on CBC  IVF  IV antibiotic  Urine culture        Asthma   Assessment & Plan    Less likely to be with an acute exacerbation  Continue home medication        Essential hypertension   Assessment & Plan    Stable  Cont home medication         Gout   Assessment & Plan    Left open extremity gout  Continue home medication        Neuropathy   Assessment & Plan    Chronic  Continue home medication        General weakness   Assessment & Plan    Most likely related with a UTI  See above plan          VTE Prophylaxis: Enoxaparin (Lovenox)  / sequential compression device   Code Status: full code  POLST: There is no POLST form on file for this patient (pre-hospital)    Anticipated Length of Stay:  Patient will be admitted on an Inpatient basis with an anticipated length of stay of> 2 midnights  Justification for Hospital Stay: UTI requiring IV antibiotic  Total Time for Visit, including Counseling / Coordination of Care: 45 minutes  Greater than 50% of this total time spent on direct patient counseling and coordination of care  Chief Complaint:  General malaise     History of Present Illness:    Ty Manrique is a 80 y o  female with significant past medical history of HTN, Hyperlipidemia, Chronic neuropathy pain, Gout who came to the ED complaining of persistent cough and urinary symptoms accompanied of general malaise, weakness for the last 2 weeks  She reported cough has been dry but constant which doesn't improved with home medication  She has different episode of UTI in the past but this time it has been with mild symptoms      Review of Systems:    Review of Systems   Constitutional: Positive for activity change, appetite change and chills  HENT: Negative  Negative for congestion, dental problem, drooling and ear discharge  Eyes: Negative  Negative for pain and discharge  Respiratory: Positive for cough  Negative for apnea, choking, chest tightness, shortness of breath, wheezing and stridor  Cardiovascular: Negative for chest pain, palpitations and leg swelling  Gastrointestinal: Negative for abdominal distention, abdominal pain, anal bleeding, blood in stool, constipation, diarrhea, nausea and vomiting  Endocrine: Negative for cold intolerance and heat intolerance  Genitourinary: Positive for frequency  Negative for decreased urine volume, difficulty urinating, dyspareunia, dysuria, enuresis, flank pain, genital sores, hematuria, menstrual problem, pelvic pain, urgency, vaginal bleeding, vaginal discharge and vaginal pain  Musculoskeletal: Positive for arthralgias  Negative for back pain, gait problem, joint swelling, myalgias, neck pain and neck stiffness  Skin: Positive for pallor  Neurological: Negative for dizziness, facial asymmetry and headaches  Psychiatric/Behavioral: Negative  Negative for agitation  Past Medical and Surgical History:     Past Medical History:   Diagnosis Date    Arthritis     Asthma     Cancer ([facility] [address]     Gout     Myocardial infarction     Neuropathy        Past Surgical History:   Procedure Laterality Date    APPENDECTOMY      HYSTERECTOMY         Meds/Allergies:    Prior to Admission medications    Medication Sig Start Date End Date Taking? Authorizing Provider   colchicine (COLCRYS) 0 6 mg tablet Take 0 6 mg by mouth daily  Yes Historical Provider, MD   dexlansoprazole (DEXILANT) 60 MG capsule Take 60 mg by mouth daily     Yes Historical Provider, MD   donepezil (ARICEPT) 5 mg tablet Take 1 tablet by mouth daily 3/27/13  Yes Historical Provider, MD   gabapentin (NEURONTIN) 100 mg capsule Take 100 mg by mouth 4 (four) times a day   Yes Historical Provider, MD   montelukast (SINGULAIR) 10 mg tablet Take 10 mg by mouth daily     Yes Historical Provider, MD   albuterol (PROVENTIL HFA,VENTOLIN HFA) 90 mcg/act inhaler Inhale 2 puffs every 6 (six) hours as needed for wheezing    Historical Provider, MD   cyanocobalamin (VITAMIN B-12) 1,000 mcg tablet Take 1,000 mcg by mouth daily  Historical Provider, MD   famotidine (PEPCID) 20 mg tablet Take 1 tablet by mouth 2 (two) times a day for 4 days  Patient taking differently: Take 20 mg by mouth daily   9/20/16 12/4/17  Matt Helms MD   iron polysaccharides (NIFEREX) 150 mg capsule Take 150 mg by mouth 2 (two) times a day    Historical Provider, MD   oxyCODONE (OXY-IR) 5 MG capsule Take 1 capsule by mouth every 6 (six) hours as needed for severe pain for up to 20 doses Max Daily Amount: 20 mg 12/4/17   Sidney Khan MD   oxyCODONE-acetaminophen (PERCOCET) 5-325 mg per tablet Take 1 tablet by mouth every 6 (six) hours as needed for moderate pain  Historical Provider, MD   pravastatin (PRAVACHOL) 10 mg tablet Take 10 mg by mouth daily  Historical Provider, MD     I have reviewed home medications with patient personally  Allergies: No Known Allergies    Social History:     Marital Status:     Occupation:   Patient Pre-hospital Living Situation:  Independent of activities of daily living  Patient Pre-hospital Level of Mobility:   Patient Pre-hospital Diet Restrictions:   Substance Use History:   History   Alcohol Use No     History   Smoking Status    Never Smoker   Smokeless Tobacco    Never Used     History   Drug Use No       Family History:    non-contributory    Physical Exam:     Vitals:   Blood Pressure: 155/69 (12/13/17 1623)  Pulse: 76 (12/13/17 1623)  Temperature: 99 5 °F (37 5 °C) (12/13/17 1623)  Temp Source: Oral (12/13/17 1623)  Respirations: 18 (12/13/17 1623)  Weight - Scale: 58 1 kg (128 lb 1 4 oz) (12/13/17 1051)  SpO2: 97 % (12/13/17 1623)    Physical Exam        Additional Data:     Lab Results: I have personally reviewed pertinent reports  Results from last 7 days  Lab Units 12/13/17  1100   WBC Thousand/uL 11 33*   HEMOGLOBIN g/dL 13 2   HEMATOCRIT % 41 2   PLATELETS Thousands/uL 244   NEUTROS PCT % 84*   LYMPHS PCT % 8*   MONOS PCT % 7   EOS PCT % 1       Results from last 7 days  Lab Units 12/13/17  1100   SODIUM mmol/L 136   POTASSIUM mmol/L 3 8   CHLORIDE mmol/L 101   CO2 mmol/L 26   BUN mg/dL 10   CREATININE mg/dL 0 89   CALCIUM mg/dL 9 3   TOTAL PROTEIN g/dL 7 8   BILIRUBIN TOTAL mg/dL 0 70   ALK PHOS U/L 92   ALT U/L 19   AST U/L 16   GLUCOSE RANDOM mg/dL 116           Imaging: I have personally reviewed pertinent reports  Ct Abdomen Pelvis Wo Contrast    Result Date: 12/4/2017  Narrative: CT ABDOMEN AND PELVIS WITHOUT IV CONTRAST INDICATION:  "pt here for left side abd pain  hx of bowel loop for many years  afraid it may be the reason of her pain  c/o some diarrhea  onset yesterday  also has an URI" COMPARISON: None  TECHNIQUE:  CT examination of the abdomen and pelvis was performed without intravenous contrast   Reformatted images were created in axial, sagittal, and coronal planes  Radiation dose length product (DLP) for this visit:  236 mGy-cm   This examination, like all CT scans performed in the [facility], was performed utilizing techniques to minimize radiation dose exposure, including the use of iterative reconstruction and automated exposure control  Enteric contrast was administered  FINDINGS: ABDOMEN LOWER CHEST:  No significant abnormalities identified in the lower chest  LIVER/BILIARY TREE:  Unremarkable  GALLBLADDER:  Cholecystectomy SPLEEN:  Unremarkable  PANCREAS:  Unremarkable  ADRENAL GLANDS:  Unremarkable  KIDNEYS/URETERS:  Simple parapelvic cyst   No hydronephrosis or perinephric collection    Numerous calcifications adjacent to the bladder base on the left are likely external to the ureteral lumen  STOMACH AND BOWEL:  Unremarkable  APPENDIX:  No findings to suggest appendicitis  ABDOMINOPELVIC CAVITY:  No ascites or free intraperitoneal air  No lymphadenopathy  VESSELS:  Unremarkable for patient's age  PELVIS REPRODUCTIVE ORGANS:  Status post hysterectomy  URINARY BLADDER:  Unremarkable  ABDOMINAL WALL/INGUINAL REGIONS:  Probable pelvic floor laxity  OSSEOUS STRUCTURES:  No acute fracture or destructive osseous lesion  Impression: No acute inflammatory findings within the abdomen or pelvis  Workstation performed: [id number]     Xr Chest 1 View Portable    Result Date: 12/4/2017  Narrative: CHEST INDICATION:  Left-sided abdominal pain  COMPARISON:  9/20/2016 VIEWS:   AP frontal IMAGES:  1 FINDINGS:     Cardiomediastinal silhouette appears unremarkable  The lungs are clear  No pneumothorax or pleural effusion  Visualized osseous structures appear within normal limits for the patient's age  Impression: No active pulmonary disease  Workstation performed: [id number]     Xr Chest 2 Views    Result Date: 12/13/2017  Narrative: CHEST INDICATION:  cough  History taken directly from the electronic ordering system  COMPARISON:  Chest x-ray performed on 12/4/2017 VIEWS:  Frontal and lateral projections IMAGES:  2 FINDINGS:     Cardiomediastinal silhouette appears unremarkable  No focal consolidation  No pleural effusion or pneumothorax  Visualized osseous structures appear within normal limits for the patient's age  Impression: No active pulmonary disease  Workstation performed: [id number]       EKG, Pathology, and Other Studies Reviewed on Admission:   · EKG:     Allscripts / Epic Records Reviewed: Yes     ** Please Note: This note has been constructed using a voice recognition system   **

## 2017-12-14 PROBLEM — F32.A DEPRESSION: Status: ACTIVE | Noted: 2017-12-14

## 2017-12-14 LAB
ANION GAP SERPL CALCULATED.3IONS-SCNC: 8 MMOL/L (ref 4–13)
BUN SERPL-MCNC: 10 MG/DL (ref 5–25)
CALCIUM SERPL-MCNC: 8.6 MG/DL (ref 8.3–10.1)
CHLORIDE SERPL-SCNC: 106 MMOL/L (ref 100–108)
CO2 SERPL-SCNC: 24 MMOL/L (ref 21–32)
CREAT SERPL-MCNC: 0.82 MG/DL (ref 0.6–1.3)
ERYTHROCYTE [DISTWIDTH] IN BLOOD BY AUTOMATED COUNT: 12.9 % (ref 11.6–15.1)
FLUAV AG SPEC QL: NORMAL
FLUBV AG SPEC QL: NORMAL
GFR SERPL CREATININE-BSD FRML MDRD: 63 ML/MIN/1.73SQ M
GLUCOSE SERPL-MCNC: 101 MG/DL (ref 65–140)
HCT VFR BLD AUTO: 34.7 % (ref 34.8–46.1)
HGB BLD-MCNC: 10.9 G/DL (ref 11.5–15.4)
MCH RBC QN AUTO: 29.7 PG (ref 26.8–34.3)
MCHC RBC AUTO-ENTMCNC: 31.4 G/DL (ref 31.4–37.4)
MCV RBC AUTO: 95 FL (ref 82–98)
PLATELET # BLD AUTO: 212 THOUSANDS/UL (ref 149–390)
PMV BLD AUTO: 10.3 FL (ref 8.9–12.7)
POTASSIUM SERPL-SCNC: 3.9 MMOL/L (ref 3.5–5.3)
RBC # BLD AUTO: 3.67 MILLION/UL (ref 3.81–5.12)
RSV B RNA SPEC QL NAA+PROBE: NORMAL
SODIUM SERPL-SCNC: 138 MMOL/L (ref 136–145)
WBC # BLD AUTO: 8.75 THOUSAND/UL (ref 4.31–10.16)

## 2017-12-14 PROCEDURE — 85027 COMPLETE CBC AUTOMATED: CPT | Performed by: FAMILY MEDICINE

## 2017-12-14 PROCEDURE — 80048 BASIC METABOLIC PNL TOTAL CA: CPT | Performed by: FAMILY MEDICINE

## 2017-12-14 RX ORDER — PREDNISONE 10 MG/1
10 TABLET ORAL DAILY
Status: DISCONTINUED | OUTPATIENT
Start: 2017-12-14 | End: 2017-12-16 | Stop reason: HOSPADM

## 2017-12-14 RX ADMIN — CYANOCOBALAMIN TAB 500 MCG 1000 MCG: 500 TAB at 09:56

## 2017-12-14 RX ADMIN — CEFTRIAXONE 1000 MG: 1 INJECTION, SOLUTION INTRAVENOUS at 17:50

## 2017-12-14 RX ADMIN — PRAVASTATIN SODIUM 10 MG: 10 TABLET ORAL at 09:56

## 2017-12-14 RX ADMIN — MONTELUKAST SODIUM 10 MG: 10 TABLET, FILM COATED ORAL at 09:56

## 2017-12-14 RX ADMIN — COLCHICINE 0.6 MG: 0.6 TABLET, FILM COATED ORAL at 09:56

## 2017-12-14 RX ADMIN — DONEPEZIL HYDROCHLORIDE 5 MG: 5 TABLET, FILM COATED ORAL at 09:56

## 2017-12-14 RX ADMIN — GABAPENTIN 100 MG: 100 CAPSULE ORAL at 17:41

## 2017-12-14 RX ADMIN — GABAPENTIN 100 MG: 100 CAPSULE ORAL at 22:08

## 2017-12-14 RX ADMIN — GUAIFENESIN AND DEXTROMETHORPHAN 10 ML: 100; 10 SYRUP ORAL at 10:21

## 2017-12-14 RX ADMIN — PREDNISONE 10 MG: 10 TABLET ORAL at 17:41

## 2017-12-14 RX ADMIN — GUAIFENESIN AND DEXTROMETHORPHAN 10 ML: 100; 10 SYRUP ORAL at 05:33

## 2017-12-14 RX ADMIN — PANTOPRAZOLE SODIUM 40 MG: 40 TABLET, DELAYED RELEASE ORAL at 05:31

## 2017-12-14 RX ADMIN — GABAPENTIN 100 MG: 100 CAPSULE ORAL at 12:52

## 2017-12-14 RX ADMIN — GUAIFENESIN AND DEXTROMETHORPHAN 10 ML: 100; 10 SYRUP ORAL at 22:08

## 2017-12-14 RX ADMIN — GABAPENTIN 100 MG: 100 CAPSULE ORAL at 09:56

## 2017-12-14 RX ADMIN — OXYCODONE HYDROCHLORIDE 5 MG: 5 TABLET ORAL at 12:52

## 2017-12-14 RX ADMIN — GUAIFENESIN AND DEXTROMETHORPHAN 10 ML: 100; 10 SYRUP ORAL at 17:41

## 2017-12-14 NOTE — ASSESSMENT & PLAN NOTE
Patient with positive urine plus burning sensation, hematuria, elevated white blood cell count on CBC   Previous Decrease oral intake  Cont IVF  Cont IV antibiotic

## 2017-12-14 NOTE — PROGRESS NOTES
Progress Note - Doyle Lopez 1927, 80 y o  female MRN: [id number]    Unit/Bed#: -01 Encounter: [id number]    Primary Care Provider: Ramez Montanez MD   Date and time admitted to hospital: 2017 10:48 AM        * UTI (urinary tract infection)   Assessment & Plan    Patient with positive urine plus burning sensation, hematuria, elevated white blood cell count on CBC  Previous Decrease oral intake  Cont IVF  Cont IV antibiotic          Asthma   Assessment & Plan    Less likely to be with an acute exacerbation  Continue home medication        Essential hypertension   Assessment & Plan    Stable  Cont home medication         Gout   Assessment & Plan    Left open extremity gout which now patient is complaining all over   Trial of steroid         Neuropathy   Assessment & Plan    Chronic  Continue home medication        General weakness   Assessment & Plan    Most likely related with a UTI  See above plan          VTE Pharmacologic Prophylaxis:   Pharmacologic: Enoxaparin (Lovenox)  Mechanical VTE Prophylaxis in Place: Yes    Patient Centered Rounds: I have performed bedside rounds with nursing staff today  Discussions with Specialists or Other Care Team Provider:   Education and Discussions with Family / Patient:  II talked to the daughter over the phone    Time Spent for Care: 20 minutes  More than 50% of total time spent on counseling and coordination of care as described above      Current Length of Stay: 1 day(s)    Current Patient Status: Inpatient   Certification Statement: The patient will continue to require additional inpatient hospital stay due to UTI requiring IV antibiotic    Discharge Plan:  Patient it is not okay to be discharged yet depends on clinical course   Code Status: Level 1 - Full Code      Subjective:   Patient reports pain all over her body, she said that she does not want to live anymore   Objective:     Vitals:   Temp (24hrs), Av 6 °F (37 °C), Min:97 7 °F (36 5 °C), Max:99 5 °F (37 5 °C)    HR:  [70-78] 73  Resp:  [18] 18  BP: (132-155)/(57-69) 143/61  SpO2:  [94 %-97 %] 97 %  Body mass index is 22 69 kg/m²  Input and Output Summary (last 24 hours): Intake/Output Summary (Last 24 hours) at 12/14/17 1542  Last data filed at 12/14/17 0900   Gross per 24 hour   Intake               80 ml   Output                0 ml   Net               80 ml       Physical Exam:     Physical Exam   Constitutional: She is oriented to person, place, and time  No distress  Cardiovascular: Normal rate and regular rhythm  Exam reveals no gallop and no friction rub  No murmur heard  Pulmonary/Chest: Effort normal and breath sounds normal  No respiratory distress  She has no wheezes  She has no rales  She exhibits no tenderness  Abdominal: Soft  She exhibits no mass  There is no tenderness  There is no rebound and no guarding  Musculoskeletal: She exhibits no edema, tenderness or deformity  Neurological: She is alert and oriented to person, place, and time  She displays normal reflexes  No cranial nerve deficit  She exhibits normal muscle tone  Coordination normal    Skin: Skin is warm  She is not diaphoretic     Psychiatric:   depressed       Additional Data:     Labs:      Results from last 7 days  Lab Units 12/14/17  0515 12/13/17  1100   WBC Thousand/uL 8 75 11 33*   HEMOGLOBIN g/dL 10 9* 13 2   HEMATOCRIT % 34 7* 41 2   PLATELETS Thousands/uL 212 244   NEUTROS PCT %  --  84*   LYMPHS PCT %  --  8*   MONOS PCT %  --  7   EOS PCT %  --  1       Results from last 7 days  Lab Units 12/14/17  0515 12/13/17  1100   SODIUM mmol/L 138 136   POTASSIUM mmol/L 3 9 3 8   CHLORIDE mmol/L 106 101   CO2 mmol/L 24 26   BUN mg/dL 10 10   CREATININE mg/dL 0 82 0 89   CALCIUM mg/dL 8 6 9 3   TOTAL PROTEIN g/dL  --  7 8   BILIRUBIN TOTAL mg/dL  --  0 70   ALK PHOS U/L  --  92   ALT U/L  --  19   AST U/L  --  16   GLUCOSE RANDOM mg/dL 101 116           * I Have Reviewed All Lab Data Listed Above   * Additional Pertinent Lab Tests Reviewed: All Labs Within Last 24 Hours Reviewed    Imaging:    Imaging Reports Reviewed Today Include:   Imaging Personally Reviewed by Myself Includes:      Recent Cultures (last 7 days):       Results from last 7 days  Lab Units 12/13/17  1241 12/13/17  1232 12/13/17  1100   BLOOD CULTURE  No Growth at 24 hrs   --  No Growth at 24 hrs  INFLUENZA A PCR   --  None Detected  --    INFLUENZA B PCR   --  None Detected  --    RSV PCR   --  None Detected  --        Last 24 Hours Medication List:     cefTRIAXone 1,000 mg Intravenous Q24H   colchicine 0 6 mg Oral Daily   cyanocobalamin 1,000 mcg Oral Daily   donepezil 5 mg Oral Daily   enoxaparin 40 mg Subcutaneous Daily   gabapentin 100 mg Oral 4x Daily   montelukast 10 mg Oral Daily   pantoprazole 40 mg Oral Early Morning   pravastatin 10 mg Oral Daily        Today, Patient Was Seen By: Elza De La Paz MD    ** Please Note: Dragon 360 Dictation voice to text software may have been used in the creation of this document   **

## 2017-12-14 NOTE — PLAN OF CARE
Nutrition/Hydration-ADULT     Nutrient/Hydration intake appropriate for improving, restoring or maintaining nutritional needs Progressing        SAFETY ADULT     Patient will remain free of falls Progressing     Maintain or return to baseline ADL function Progressing     Maintain or return mobility status to optimal level Progressing

## 2017-12-14 NOTE — CASE MANAGEMENT
Initial Clinical Review    Admission: Date/Time/Statement: 12/13/17 @ 1511     Orders Placed This Encounter   Procedures    Inpatient Admission (expected length of stay for this patient is greater than two midnights)     Standing Status:   Standing     Number of Occurrences:   1     Order Specific Question:   Admitting Physician     Answer:   Sanjiv Meier [[id number]]     Order Specific Question:   Level of Care     Answer:   Med Surg [16]     Order Specific Question:   Estimated length of stay     Answer:   More than 2 Midnights     Order Specific Question:   Certification     Answer:   I certify that inpatient services are medically necessary for this patient for a duration of greater than two midnights  See H&P and MD Progress Notes for additional information about the patient's course of treatment  ED: Date/Time/Mode of Arrival:   ED Arrival Information     Expected Arrival Acuity Means of Arrival Escorted By Service Admission Type    - 12/13/2017 10:48 Urgent Ambulance [facility] General Medicine Urgent    Arrival Complaint    -          Chief Complaint:   Chief Complaint   Patient presents with    Flu Symptoms     body aches, flu like symptoms, cough, poor PO intakes x2-3 wks; pt is weak       History of Illness: [de-identified] y o  female with significant past medical history of HTN, Hyperlipidemia, Chronic neuropathy pain, Gout who came to the ED complaining of persistent cough and urinary symptoms accompanied of general malaise, weakness for the last 2 weeks  She reported cough has been dry but constant which doesn't improved with home medication  She has different episode of UTI in the past but this time it has been with mild symptoms      ED Vital Signs:   ED Triage Vitals   Temperature Pulse Respirations Blood Pressure SpO2   12/13/17 1051 12/13/17 1051 12/13/17 1051 12/13/17 1051 12/13/17 1051   98 1 °F (36 7 °C) 91 20 162/75 96 %      Temp Source Heart Rate Source Patient Position - Orthostatic VS BP Location FiO2 (%)   12/13/17 1051 12/13/17 1518 12/13/17 1051 12/13/17 1051 --   Oral Monitor Lying Right arm       Pain Score       12/13/17 1051       8        Wt Readings from Last 1 Encounters:   12/13/17 58 1 kg (128 lb 1 4 oz)       Vital Signs (abnormal):  Maximum temperature 99 5     Abnormal Labs/Diagnostic Test Results:   UA small leukocytes  + nitrite  2 + ketones  Albumin 3 1   Wbc 11 33    Labs am 12/14- hgb 10 9, hct 34 7    ED Treatment: blood cultures  Medication Administration from 12/13/2017 1048 to 12/13/2017 1623       Date/Time Order Dose Route Action Action by Comments     12/13/2017 1410 sodium chloride 0 9 % bolus 1,000 mL 0 mL Intravenous Stopped Mei Calhoun RN      12/13/2017 1239 sodium chloride 0 9 % bolus 1,000 mL 1,000 mL Intravenous New Bag Mei Calhoun RN      12/13/2017 1235 ipratropium-albuterol (DUO-NEB) 0 5-2 5 mg/mL inhalation solution 3 mL 3 mL Nebulization Given Mei Calhoun RN      12/13/2017 1426 colchicine (COLCRYS) tablet 0 6 mg 0 6 mg Oral Given Mei Calhoun RN      12/13/2017 1426 gabapentin (NEURONTIN) capsule 100 mg 100 mg Oral Given Mei Calhoun RN      12/13/2017 1546 cefTRIAXone (ROCEPHIN) IVPB (premix) 1,000 mg 0 mg Intravenous Stopped Bill Brock RN      12/13/2017 1525 cefTRIAXone (ROCEPHIN) IVPB (premix) 1,000 mg 1,000 mg Intravenous New Bag Bill Brock RN           Past Medical/Surgical History:    Active Ambulatory Problems     Diagnosis Date Noted    No Active Ambulatory Problems     Resolved Ambulatory Problems     Diagnosis Date Noted    No Resolved Ambulatory Problems     Past Medical History:   Diagnosis Date    Arthritis     Asthma     Cancer ([facility] [address]     Gout     Myocardial infarction     Neuropathy        Admitting Diagnosis: Cough [R05]  UTI (urinary tract infection) [N39 0]  Generalized weakness [R53 1]  Flu-like symptoms [R68 89]    Age/Sex: 80 y o  female    Assessment/Plan:   UTI (urinary tract infection)   Assessment & Plan     Patient with positive urine plus burning sensation, hematuria, elevated white blood cell count on CBC  IVF  IV antibiotic  Urine culture       Asthma   Assessment & Plan     Less likely to be with an acute exacerbation  Continue home medication       Essential hypertension   Assessment & Plan     Stable  Cont home medication        Gout   Assessment & Plan     Left open extremity gout  Continue home medication       Neuropathy   Assessment & Plan     Chronic  Continue home medication       General weakness   Assessment & Plan     Most likely related with a UTI  See above plan             Admission Orders:  12/13/2017  1512 INPATIENT   Scheduled Meds:   cefTRIAXone 1,000 mg Intravenous Q24H   colchicine 0 6 mg Oral Daily   cyanocobalamin 1,000 mcg Oral Daily   donepezil 5 mg Oral Daily   enoxaparin 40 mg Subcutaneous Daily   gabapentin 100 mg Oral 4x Daily   montelukast 10 mg Oral Daily   pantoprazole 40 mg Oral Early Morning   pravastatin 10 mg Oral Daily     Continuous Infusions:    PRN Meds:   albuterol    dextromethorphan-guaiFENesin - used x 3      oxyCODONE    oxyCODONE-acetaminophen - used x 1  OTHER ORDERS: scds  Continuous pulse oximetry and cardiac monitoring      [facility] in the [facility] by Magdy Barahona for 2017  Network Utilization Review Department  Phone: [phone]; Fax [phone]  ATTENTION: The Network Utilization Review Department is now centralized for our 7 Facilities  Make a note that we have a new phone and fax numbers for our Department  Please call with any questions or concerns to [phone] and carefully follow the prompts so that you are directed to the right person  All voicemails are confidential  Fax any determinations, approvals, denials, and requests for initial or continue stay review clinical to [phone]   Due to HIGH CALL volume, it would be easier if you could please send faxed requests to expedite your requests and in part, help us provide discharge notifications faster

## 2017-12-14 NOTE — MALNUTRITION/BMI
This medical record reflects one or more clinical indicators suggestive of malnutrition and/or morbid obesity  Please indicate the one diagnosis below which you feel best reflects the clinical picture  Malnutrition Findings:   acute illness  malnutrition of mild degree    BMI Findings:  19-24 9    Body mass index is 22 69 kg/m²  See Nutrition note dated 12/14/2017 for additional details  Completed nutrition assessment is viewable in the nutrition documentation  Mild malnutrition related to inadequate oral intake, recent illness as evidenced by temporal indentation, dark circles under eyes, depletion body fat

## 2017-12-14 NOTE — PROGRESS NOTES
Patient daughter at bedside  Copy of advance directive received  Daughter crying  Stated, "My mom just wants to die  She doesn't want to eat anymore  All she wants are her pain pills, colchicine and gabapentin " Patient daughter would like Slim to call her with updates  Slim aware

## 2017-12-15 RX ORDER — MEGESTROL ACETATE 40 MG/1
40 TABLET ORAL 4 TIMES DAILY
Status: DISCONTINUED | OUTPATIENT
Start: 2017-12-15 | End: 2017-12-16 | Stop reason: HOSPADM

## 2017-12-15 RX ORDER — ECHINACEA PURPUREA EXTRACT 125 MG
1 TABLET ORAL
Status: DISCONTINUED | OUTPATIENT
Start: 2017-12-15 | End: 2017-12-16 | Stop reason: HOSPADM

## 2017-12-15 RX ADMIN — MEGESTROL ACETATE 40 MG: 40 TABLET ORAL at 22:20

## 2017-12-15 RX ADMIN — GABAPENTIN 100 MG: 100 CAPSULE ORAL at 08:46

## 2017-12-15 RX ADMIN — MONTELUKAST SODIUM 10 MG: 10 TABLET, FILM COATED ORAL at 08:46

## 2017-12-15 RX ADMIN — OXYCODONE HYDROCHLORIDE AND ACETAMINOPHEN 1 TABLET: 5; 325 TABLET ORAL at 06:17

## 2017-12-15 RX ADMIN — PRAVASTATIN SODIUM 10 MG: 10 TABLET ORAL at 08:46

## 2017-12-15 RX ADMIN — PANTOPRAZOLE SODIUM 40 MG: 40 TABLET, DELAYED RELEASE ORAL at 06:17

## 2017-12-15 RX ADMIN — GABAPENTIN 100 MG: 100 CAPSULE ORAL at 18:24

## 2017-12-15 RX ADMIN — CEFTRIAXONE 1000 MG: 1 INJECTION, SOLUTION INTRAVENOUS at 15:05

## 2017-12-15 RX ADMIN — PREDNISONE 10 MG: 10 TABLET ORAL at 08:46

## 2017-12-15 RX ADMIN — CYANOCOBALAMIN TAB 500 MCG 1000 MCG: 500 TAB at 08:46

## 2017-12-15 RX ADMIN — GABAPENTIN 100 MG: 100 CAPSULE ORAL at 22:20

## 2017-12-15 RX ADMIN — Medication 1 SPRAY: at 15:10

## 2017-12-15 RX ADMIN — Medication 1 SPRAY: at 19:58

## 2017-12-15 RX ADMIN — ENOXAPARIN SODIUM 40 MG: 40 INJECTION SUBCUTANEOUS at 08:47

## 2017-12-15 RX ADMIN — COLCHICINE 0.6 MG: 0.6 TABLET, FILM COATED ORAL at 08:46

## 2017-12-15 RX ADMIN — GABAPENTIN 100 MG: 100 CAPSULE ORAL at 12:41

## 2017-12-15 RX ADMIN — MEGESTROL ACETATE 40 MG: 40 TABLET ORAL at 18:24

## 2017-12-15 RX ADMIN — DONEPEZIL HYDROCHLORIDE 5 MG: 5 TABLET, FILM COATED ORAL at 08:46

## 2017-12-15 NOTE — PROGRESS NOTES
Progress Note - Yaz Kaur 7/4/1927, 80 y o  female MRN: [id number]    Unit/Bed#: -01 Encounter: [id number]    Primary Care Provider: Chato Handley MD   Date and time admitted to hospital: 12/13/2017 10:48 AM        * UTI (urinary tract infection)   Assessment & Plan    Patient with positive urine plus burning sensation, hematuria, elevated white blood cell count on CBC  Previous Decrease oral intake  Still complaining of general malaise  No ready to be discharge  Cont IVF  Cont IV antibiotic          Depression   Assessment & Plan    adjustment disorder as per psyquiatry evaluation  Closely monitor         Asthma   Assessment & Plan    Continue home medication        Essential hypertension   Assessment & Plan    Stable  Cont home medication         Gout   Assessment & Plan    Some improvement  No active complain   Cont steroid        Neuropathy   Assessment & Plan    Chronic  Continue home medication        General weakness   Assessment & Plan    Most likely related with a UTI  Still complaining of poor  appetite and low energy   Trial of Megestrol           VTE Pharmacologic Prophylaxis:   Pharmacologic: Enoxaparin (Lovenox)  Mechanical VTE Prophylaxis in Place: Yes    Patient Centered Rounds: I have performed bedside rounds with nursing staff today  Discussions with Specialists or Other Care Team Provider:psyquiatry   Education and Discussions with Family / Patient: patient     Time Spent for Care: 20 minutes  More than 50% of total time spent on counseling and coordination of care as described above      Current Length of Stay: 2 day(s)    Current Patient Status: Inpatient   Certification Statement: The patient will continue to require additional inpatient hospital stay due to UTI on IV antibiotic     Discharge Plan: depend on clinical course, still on IV antibiotic, discharge planning tomorrow   Code Status: Level 1 - Full Code      Subjective:   I started to feel better today, still weak, Objective:     Vitals:   Temp (24hrs), Av 9 °F (36 6 °C), Min:97 6 °F (36 4 °C), Max:98 1 °F (36 7 °C)    HR:  [71-86] 71  Resp:  [18-19] 18  BP: (134-164)/(63-74) 164/74  SpO2:  [94 %-98 %] 98 %  Body mass index is 22 69 kg/m²  Input and Output Summary (last 24 hours): Intake/Output Summary (Last 24 hours) at 12/15/17 1635  Last data filed at 12/15/17 1233   Gross per 24 hour   Intake              480 ml   Output             1000 ml   Net             -520 ml       Physical Exam:     Physical Exam   Constitutional: She is oriented to person, place, and time  No distress  Cardiovascular: Normal rate and normal heart sounds  Exam reveals no gallop and no friction rub  No murmur heard  Pulmonary/Chest: Effort normal  She has no wheezes  She has no rales  She exhibits no tenderness  Decrease breath sound bilaterally    Abdominal: Soft  Bowel sounds are normal  She exhibits no distension and no mass  There is no tenderness  There is no rebound and no guarding  Musculoskeletal: She exhibits no edema, tenderness or deformity  Neurological: She is alert and oriented to person, place, and time  She has normal reflexes  She displays normal reflexes  No cranial nerve deficit  She exhibits normal muscle tone  Coordination normal    Skin: Skin is warm  She is not diaphoretic  Psychiatric: She has a normal mood and affect         Additional Data:     Labs:      Results from last 7 days  Lab Units 17  0515 17  1100   WBC Thousand/uL 8 75 11 33*   HEMOGLOBIN g/dL 10 9* 13 2   HEMATOCRIT % 34 7* 41 2   PLATELETS Thousands/uL 212 244   NEUTROS PCT %  --  84*   LYMPHS PCT %  --  8*   MONOS PCT %  --  7   EOS PCT %  --  1       Results from last 7 days  Lab Units 17  0515 17  1100   SODIUM mmol/L 138 136   POTASSIUM mmol/L 3 9 3 8   CHLORIDE mmol/L 106 101   CO2 mmol/L 24 26   BUN mg/dL 10 10   CREATININE mg/dL 0 82 0 89   CALCIUM mg/dL 8 6 9 3   TOTAL PROTEIN g/dL  --  7 8 BILIRUBIN TOTAL mg/dL  --  0 70   ALK PHOS U/L  --  92   ALT U/L  --  19   AST U/L  --  16   GLUCOSE RANDOM mg/dL 101 116           * I Have Reviewed All Lab Data Listed Above  * Additional Pertinent Lab Tests Reviewed: All Labs Within Last 24 Hours Reviewed    Imaging:    Imaging Reports Reviewed Today Include:   Imaging Personally Reviewed by Myself Includes:      Recent Cultures (last 7 days):       Results from last 7 days  Lab Units 12/13/17  1241 12/13/17  1232 12/13/17  1100   BLOOD CULTURE  No Growth at 48 hrs  --  No Growth at 48 hrs  INFLUENZA A PCR   --  None Detected  --    INFLUENZA B PCR   --  None Detected  --    RSV PCR   --  None Detected  --        Last 24 Hours Medication List:     cefTRIAXone 1,000 mg Intravenous Q24H   colchicine 0 6 mg Oral Daily   cyanocobalamin 1,000 mcg Oral Daily   donepezil 5 mg Oral Daily   enoxaparin 40 mg Subcutaneous Daily   gabapentin 100 mg Oral 4x Daily   montelukast 10 mg Oral Daily   pantoprazole 40 mg Oral Early Morning   pravastatin 10 mg Oral Daily   predniSONE 10 mg Oral Daily        Today, Patient Was Seen By: Annette Jefferson MD    ** Please Note: Dragon 360 Dictation voice to text software may have been used in the creation of this document   **

## 2017-12-15 NOTE — CASE MANAGEMENT
Continued Stay Review    Date: 12/15/2017     Vital Signs: /65   Pulse 86   Temp 98 1 °F (36 7 °C) (Oral)   Resp 19   Ht 5' 3" (1 6 m)   Wt 58 1 kg (128 lb 1 4 oz)   SpO2 95%   BMI 22 69 kg/m²     Medications:   Scheduled Meds:   cefTRIAXone 1,000 mg Intravenous Q24H   colchicine 0 6 mg Oral Daily   cyanocobalamin 1,000 mcg Oral Daily   donepezil 5 mg Oral Daily   enoxaparin 40 mg Subcutaneous Daily   gabapentin 100 mg Oral 4x Daily   montelukast 10 mg Oral Daily   pantoprazole 40 mg Oral Early Morning   pravastatin 10 mg Oral Daily   predniSONE 10 mg Oral Daily     Continuous Infusions:    PRN Meds:   albuterol    dextromethorphan-guaiFENesin    oxyCODONE    oxyCODONE-acetaminophen - used x 1      sodium chloride    Abnormal Labs/Diagnostic Results:  No labs 12/15  Labs 12/14- hgb 10 9, hct 34 7  Age/Sex: 80 y o  female     Assessment/Plan:   UTI (urinary tract infection)   Assessment & Plan     Patient with positive urine plus burning sensation, hematuria, elevated white blood cell count on CBC  Previous Decrease oral intake  Cont IVF  Cont IV antibiotic          Asthma   Assessment & Plan     Less likely to be with an acute exacerbation  Continue home medication       Essential hypertension   Assessment & Plan     Stable  Cont home medication        Gout   Assessment & Plan     Left open extremity gout which now patient is complaining all over   Trial of steroid        Neuropathy   Assessment & Plan     Chronic  Continue home medication       General weakness   Assessment & Plan     Most likely related with a UTI  See above plan             Discharge Plan: To be determined  [address] [place] [address] in the [facility] by Magdy Barahona for 2017  Network Utilization Review Department  Phone: [phone]; Fax [phone]  ATTENTION: The Network Utilization Review Department is now centralized for our 7 Facilities   Make a note that we have a new phone and fax numbers for our Department  Please call with any questions or concerns to [phone] and carefully follow the prompts so that you are directed to the right person  All voicemails are confidential  Fax any determinations, approvals, denials, and requests for initial or continue stay review clinical to [phone]  Due to HIGH CALL volume, it would be easier if you could please send faxed requests to expedite your requests and in part, help us provide discharge notifications faster

## 2017-12-15 NOTE — PLAN OF CARE
Nutrition/Hydration-ADULT     Nutrient/Hydration intake appropriate for improving, restoring or maintaining nutritional needs Progressing        Potential for Falls     Patient will remain free of falls Progressing        Prexisting or High Potential for Compromised Skin Integrity     Skin integrity is maintained or improved Progressing        SAFETY ADULT     Patient will remain free of falls Progressing     Maintain or return to baseline ADL function Progressing     Maintain or return mobility status to optimal level Progressing

## 2017-12-15 NOTE — PLAN OF CARE
Problem: Nutrition/Hydration-ADULT  Goal: Nutrient/Hydration intake appropriate for improving, restoring or maintaining nutritional needs  Monitor and assess patient's nutrition/hydration status for malnutrition (ex- brittle hair, bruises, dry skin, pale skin and conjunctiva, muscle wasting, smooth red tongue, and disorientation)  Collaborate with interdisciplinary team and initiate plan and interventions as ordered  Monitor patient's weight and dietary intake as ordered or per policy  Utilize nutrition screening tool and intervene per policy  Determine patient's food preferences and provide high-protein, high-caloric foods as appropriate  INTERVENTIONS:  - Monitor oral intake, urinary output, labs, and treatment plans  - Assess nutrition and hydration status and recommend course of action  - Evaluate amount of meals eaten  - Assist patient with eating if necessary   - Allow adequate time for meals  - Recommend/ encourage appropriate diets, oral nutritional supplements, and vitamin/mineral supplements  - Order, calculate, and assess calorie counts as needed  - Recommend, monitor, and adjust tube feedings and TPN/PPN based on assessed needs  - Assess need for intravenous fluids  - Provide specific nutrition/hydration education as appropriate  - Include patient/family/caregiver in decisions related to nutrition   Outcome: Progressing      Problem: SAFETY ADULT  Goal: Patient will remain free of falls  INTERVENTIONS:  - Assess patient frequently for physical needs  -  Identify cognitive and physical deficits and behaviors that affect risk of falls    -  [place] fall precautions as indicated by assessment   - Educate patient/family on patient safety including physical limitations  - Instruct patient to call for assistance with activity based on assessment  - Modify environment to reduce risk of injury  - Consider OT/PT consult to assist with strengthening/mobility    Outcome: Progressing    Goal: Maintain or return to baseline ADL function  INTERVENTIONS:  -  Assess patient's ability to carry out ADLs; assess patient's baseline for ADL function and identify physical deficits which impact ability to perform ADLs (bathing, care of mouth/teeth, toileting, grooming, dressing, etc )  - Assess/evaluate cause of self-care deficits   - Assess range of motion  - Assess patient's mobility; develop plan if impaired  - Assess patient's need for assistive devices and provide as appropriate  - Encourage maximum independence but intervene and supervise when necessary  ¯ Involve family in performance of ADLs  ¯ Assess for home care needs following discharge   ¯ Request OT consult to assist with ADL evaluation and planning for discharge  ¯ Provide patient education as appropriate   Outcome: Progressing    Goal: Maintain or return mobility status to optimal level  INTERVENTIONS:  - Assess patient's baseline mobility status (ambulation, transfers, stairs, etc )    - Identify cognitive and physical deficits and behaviors that affect mobility  - Identify mobility aids required to assist with transfers and/or ambulation (gait belt, sit-to-stand, lift, walker, cane, etc )  - [place] fall precautions as indicated by assessment  - Record patient progress and toleration of activity level on Mobility SBAR; progress patient to next Phase/Stage  - Instruct patient to call for assistance with activity based on assessment  - Request Rehabilitation consult to assist with strengthening/weightbearing, etc    Outcome: Progressing      Problem: Prexisting or High Potential for Compromised Skin Integrity  Goal: Skin integrity is maintained or improved  INTERVENTIONS:  - Identify patients at risk for skin breakdown  - Assess and monitor skin integrity  - Assess and monitor nutrition and hydration status  - Monitor labs (i e  albumin)  - Assess for incontinence   - Turn and reposition patient  - Assist with mobility/ambulation  - Relieve pressure over bony prominences  - Avoid friction and shearing  - Provide appropriate hygiene as needed including keeping skin clean and dry  - Evaluate need for skin moisturizer/barrier cream  - Collaborate with interdisciplinary team (i e  Nutrition, Rehabilitation, etc )   - Patient/family teaching   Outcome: Progressing      Problem: Potential for Falls  Goal: Patient will remain free of falls  INTERVENTIONS:  - Assess patient frequently for physical needs  -  Identify cognitive and physical deficits and behaviors that affect risk of falls    -  [place] fall precautions as indicated by assessment   - Educate patient/family on patient safety including physical limitations  - Instruct patient to call for assistance with activity based on assessment  - Modify environment to reduce risk of injury  - Consider OT/PT consult to assist with strengthening/mobility    Outcome: Progressing

## 2017-12-15 NOTE — ASSESSMENT & PLAN NOTE
Most likely related with a UTI   Still complaining of poor  appetite and low energy   Trial of Megestrol

## 2017-12-15 NOTE — CONSULTS
Consultation - [address] 80 y o  female MRN: [id number]  Unit/Bed#: -01 Encounter: [id number]      Chief Complaint:  I have been feeling very sick, today I feel better    History of Present Illness   Physician Requesting Consult: Kelly Davis,*  Reason for Consult / Principal Problem:  Depression    Cici Abbasi is a 80 y o  female presents with persistent cough,  urinary symptoms  and weakness for 2 weeks  Patient was not eating and felt depressed  She states that today she feels better, is the 1st time that she has   an appetite  She stated that she was feeling so sick and she does not want to lives if she cannot function, today she feels better and does not feel the same way  She states she does do things around the  House  She denies any intention to hurt self  She has a good  Sleep  She had good family support and she want home with her daughter when she feels better  Psychiatric Review Of Systems:  sleep: no  appetite changes: yes  weight changes: no  energy/anergy: no  interest/pleasure/anhedonia: no  somatic symptoms: no  anxiety/panic: no  augusto: no  guilty/hopeless: no  self injurious behavior/risky behavior: yes    Historical Information   Past Psychiatric History:   None  Currently in treatment with none  Past Suicide attempts:  None  Past Violent behavior:  None  Past Psychiatric medication trial:  None    Substance Abuse History:  She denies any drugs or alcohol history     I have assessed this patient for substance use within the past 12 months     History of IP/OP rehabilitation program:  None  Smoking history:  None  Family Psychiatric History:   none    Social History  Education: 10th grade  Learning Disabilities: None  Marital history:   Living arrangement, social support: She livingwith the son-in-law and her daughter  Occupational History: retired  Functioning Relationships: good support system    Other Pertinent History: None    Traumatic History:   Abuse: None  Other Traumatic Events: None    Past Medical History:   Diagnosis Date    Arthritis     Asthma     Cancer ([facility] [address] )     Gout     Myocardial infarction     Neuropathy        Medical Review Of Systems:  Review of Systems - Negative except body ache, poor appetite, all other systems reviewed and are negative       Meds/Allergies   all current active meds have been reviewed  No Known Allergies    Objective   Vital signs in last 24 hours:  Temp:  [98 1 °F (36 7 °C)] 98 1 °F (36 7 °C)  HR:  [71-86] 71  Resp:  [18-19] 18  BP: (134-164)/(63-74) 164/74      Intake/Output Summary (Last 24 hours) at 12/15/17 1533  Last data filed at 12/15/17 1233   Gross per 24 hour   Intake              480 ml   Output             1000 ml   Net             -520 ml       Mental Status Evaluation:  Appearance:  age appropriate   Behavior:  normal   Speech:  normal pitch and normal volume   Mood:  euthymic   Affect:  mood-congruent   Language: naming objects and repeating phrases   Thought Process:  goal directed   Associations: intact associations   Thought Content:  normal   Perceptual Disturbances: None   Risk Potential: She denies any suicidal or homicidal ideation plan or intent   Sensorium:  person, place and time/date   Memory:  recent and remote memory grossly intact   Cognition:  grossly intact   Consciousness:  alert and awake    Attention: attention span and concentration were age appropriate   Intellect: within normal limits   Fund of Knowledge: awareness of current events: Fair   Insight:  fair   Judgment: fair   Muscle Strength and Tone: Within normal limits   Gait/Station: slow   Motor Activity: no abnormal movements     Lab Results:    Lab Results   Component Value Date    WBC 8 75 12/14/2017    HGB 10 9 (L) 12/14/2017    HCT 34 7 (L) 12/14/2017    MCV 95 12/14/2017     12/14/2017     Lab Results   Component Value Date    GLUCOSE 101 12/14/2017    CALCIUM 8 6 12/14/2017     12/14/2017    K 3 9 12/14/2017    CO2 24 12/14/2017     12/14/2017    BUN 10 12/14/2017    CREATININE 0 82 12/14/2017       Code Status: )Level 1 - Full Code    Assessment/Plan     Assessment:  Susi Tolbert is a 80 y o  female with a history hypertension, hyperlipidemia, chronic neuropathic pain ,goubt presented to the hospital on December 13, 2017 complaining of poor appetite , urinary symptoms and weakness for 2 weeks was feeling depressed because she was feeling very sick and she likes to do a lot of things around the house and tough  that she will not be able to do again  Today she feels better and her mood has  Improve     Diagnosis:  Adjustment disorder unspecified F [age]  Plan:   Continue medical management  No intervention at this time  Discussed with the primary team  I was sign of  Risks, benefits and possible side effects of Medications:   No medication given      Jeremiah Nichole MD

## 2017-12-15 NOTE — ASSESSMENT & PLAN NOTE
Patient with positive urine plus burning sensation, hematuria, elevated white blood cell count on CBC  Previous Decrease oral intake  Still complaining of general malaise   No ready to be discharge  Cont IVF  Cont IV antibiotic

## 2017-12-16 VITALS
WEIGHT: 128.09 LBS | HEIGHT: 63 IN | OXYGEN SATURATION: 97 % | TEMPERATURE: 97.4 F | BODY MASS INDEX: 22.7 KG/M2 | SYSTOLIC BLOOD PRESSURE: 161 MMHG | HEART RATE: 73 BPM | DIASTOLIC BLOOD PRESSURE: 70 MMHG | RESPIRATION RATE: 18 BRPM

## 2017-12-16 LAB
ANION GAP SERPL CALCULATED.3IONS-SCNC: 7 MMOL/L (ref 4–13)
BASOPHILS # BLD AUTO: 0.01 THOUSANDS/ΜL (ref 0–0.1)
BASOPHILS NFR BLD AUTO: 0 % (ref 0–1)
BUN SERPL-MCNC: 14 MG/DL (ref 5–25)
CALCIUM SERPL-MCNC: 9 MG/DL (ref 8.3–10.1)
CHLORIDE SERPL-SCNC: 110 MMOL/L (ref 100–108)
CO2 SERPL-SCNC: 25 MMOL/L (ref 21–32)
CREAT SERPL-MCNC: 0.76 MG/DL (ref 0.6–1.3)
EOSINOPHIL # BLD AUTO: 0.07 THOUSAND/ΜL (ref 0–0.61)
EOSINOPHIL NFR BLD AUTO: 1 % (ref 0–6)
ERYTHROCYTE [DISTWIDTH] IN BLOOD BY AUTOMATED COUNT: 12.8 % (ref 11.6–15.1)
GFR SERPL CREATININE-BSD FRML MDRD: 69 ML/MIN/1.73SQ M
GLUCOSE SERPL-MCNC: 117 MG/DL (ref 65–140)
HCT VFR BLD AUTO: 33.9 % (ref 34.8–46.1)
HGB BLD-MCNC: 10.9 G/DL (ref 11.5–15.4)
LYMPHOCYTES # BLD AUTO: 1.98 THOUSANDS/ΜL (ref 0.6–4.47)
LYMPHOCYTES NFR BLD AUTO: 23 % (ref 14–44)
MCH RBC QN AUTO: 30.3 PG (ref 26.8–34.3)
MCHC RBC AUTO-ENTMCNC: 32.2 G/DL (ref 31.4–37.4)
MCV RBC AUTO: 94 FL (ref 82–98)
MONOCYTES # BLD AUTO: 0.57 THOUSAND/ΜL (ref 0.17–1.22)
MONOCYTES NFR BLD AUTO: 7 % (ref 4–12)
NEUTROPHILS # BLD AUTO: 5.9 THOUSANDS/ΜL (ref 1.85–7.62)
NEUTS SEG NFR BLD AUTO: 69 % (ref 43–75)
PLATELET # BLD AUTO: 272 THOUSANDS/UL (ref 149–390)
PMV BLD AUTO: 10.2 FL (ref 8.9–12.7)
POTASSIUM SERPL-SCNC: 3.6 MMOL/L (ref 3.5–5.3)
RBC # BLD AUTO: 3.6 MILLION/UL (ref 3.81–5.12)
SODIUM SERPL-SCNC: 142 MMOL/L (ref 136–145)
WBC # BLD AUTO: 8.53 THOUSAND/UL (ref 4.31–10.16)

## 2017-12-16 PROCEDURE — 85025 COMPLETE CBC W/AUTO DIFF WBC: CPT | Performed by: FAMILY MEDICINE

## 2017-12-16 PROCEDURE — 80048 BASIC METABOLIC PNL TOTAL CA: CPT | Performed by: FAMILY MEDICINE

## 2017-12-16 RX ORDER — PREDNISONE 10 MG/1
10 TABLET ORAL DAILY
Qty: 10 TABLET | Refills: 0 | Status: SHIPPED | OUTPATIENT
Start: 2017-12-17 | End: 2019-01-24

## 2017-12-16 RX ORDER — MEGESTROL ACETATE 40 MG/1
40 TABLET ORAL 4 TIMES DAILY
Qty: 30 TABLET | Refills: 0 | Status: SHIPPED | OUTPATIENT
Start: 2017-12-16 | End: 2021-01-01

## 2017-12-16 RX ADMIN — GABAPENTIN 100 MG: 100 CAPSULE ORAL at 12:13

## 2017-12-16 RX ADMIN — GABAPENTIN 100 MG: 100 CAPSULE ORAL at 09:21

## 2017-12-16 RX ADMIN — PREDNISONE 10 MG: 10 TABLET ORAL at 09:21

## 2017-12-16 RX ADMIN — PRAVASTATIN SODIUM 10 MG: 10 TABLET ORAL at 09:21

## 2017-12-16 RX ADMIN — DONEPEZIL HYDROCHLORIDE 5 MG: 5 TABLET, FILM COATED ORAL at 09:22

## 2017-12-16 RX ADMIN — MEGESTROL ACETATE 40 MG: 40 TABLET ORAL at 09:22

## 2017-12-16 RX ADMIN — PANTOPRAZOLE SODIUM 40 MG: 40 TABLET, DELAYED RELEASE ORAL at 05:53

## 2017-12-16 RX ADMIN — COLCHICINE 0.6 MG: 0.6 TABLET, FILM COATED ORAL at 09:21

## 2017-12-16 RX ADMIN — MONTELUKAST SODIUM 10 MG: 10 TABLET, FILM COATED ORAL at 09:21

## 2017-12-16 RX ADMIN — CYANOCOBALAMIN TAB 500 MCG 1000 MCG: 500 TAB at 09:21

## 2017-12-16 RX ADMIN — CEFTRIAXONE 1000 MG: 1 INJECTION, SOLUTION INTRAVENOUS at 15:06

## 2017-12-16 RX ADMIN — MEGESTROL ACETATE 40 MG: 40 TABLET ORAL at 12:13

## 2017-12-16 RX ADMIN — ENOXAPARIN SODIUM 40 MG: 40 INJECTION SUBCUTANEOUS at 09:22

## 2017-12-16 NOTE — DISCHARGE SUMMARY
Discharge Summary - [facility]    Patient Information: Doyle Lopez 80 y o  female MRN: [id number]  Unit/Bed#: -01 Encounter: [id number]    Discharging Physician / Practitioner: Cih Rodríguez MD  PCP: Ramez Montanez MD  Admission Date: 12/13/2017  Discharge Date: 12/16/17    Reason for Admission:  Acute urinary tract infection with general malaise  Discharge Diagnoses:     Principal Problem:    UTI (urinary tract infection)  Active Problems:    General weakness    Neuropathy    Gout    Essential hypertension    Asthma    Depression  Resolved Problems:    * No resolved hospital problems  *      Consultations During Hospital Stay:  · Psychiatric    Procedures Performed:     ·     Significant Findings / Test Results:     ·     Incidental Findings:   ·     Test Results Pending at Discharge (will require follow up):   ·      Outpatient Tests Requested:  ·     Complications:  none  Hospital Course:     Doyle Lopez is a 80 y o  female with significant past medical history of HTN, Hyperlipidemia, Chronic neuropathy pain, Gout who came to the ED complaining of persistent cough and urinary symptoms accompanied of general malaise, weakness for the last 2 weeks  She reported cough has been dry but constant which doesn't improved with home medication  She has different episode of UTI in the past but this time it has been with mild symptoms  Patient completed antibiotic treatment, improved of symptoms  She was also evaluated by Psychiatric Department due  patient was having some  depressed mood, they found it was just adjustment disorder   She was found to be malnutrition reason why started on Megestrol which adequate tolerance and improved of symptoms  Condition at Discharge: stable     Discharge Day Visit / Exam:     Subjective:  I am better today  Vitals: Blood Pressure: 163/69 (12/16/17 0700)  Pulse: 69 (12/16/17 0700)  Temperature: 98 2 °F (36 8 °C) (12/16/17 0700)  Temp Source: Oral (12/16/17 0700)  Respirations: 18 (12/16/17 0700)  Height: 5' 3" (160 cm) (12/13/17 1700)  Weight - Scale: 58 1 kg (128 lb 1 4 oz) (12/13/17 1051)  SpO2: 99 % (12/16/17 0700)  Exam:   Physical Exam   Constitutional: She is oriented to person, place, and time  No distress  Cardiovascular: Normal rate and normal heart sounds  Exam reveals no gallop and no friction rub  No murmur heard  Pulmonary/Chest: Effort normal and breath sounds normal  No respiratory distress  She has no wheezes  She has no rales  She exhibits no tenderness  Abdominal: Soft  Bowel sounds are normal  She exhibits no distension and no mass  There is no tenderness  There is no guarding  Musculoskeletal: She exhibits no edema, tenderness or deformity  Neurological: She is alert and oriented to person, place, and time  She has normal reflexes  Skin: Skin is warm  She is not diaphoretic  Psychiatric: She has a normal mood and affect  Discussion with Family: daughter     Discharge instructions/Information to patient and family:   See after visit summary for information provided to patient and family  Provisions for Follow-Up Care:  See after visit summary for information related to follow-up care and any pertinent home health orders  Disposition:     Home    For Discharges to [facility] SNF:   · Not Applicable to this Patient - Not Applicable to this Patient    Planned Readmission:    Discharge Statement:  I spent 30 minutes discharging the patient  This time was spent on the day of discharge  I had direct contact with the patient on the day of discharge  Greater than 50% of the total time was spent examining patient, answering all patient questions, arranging and discussing plan of care with patient as well as directly providing post-discharge instructions  Additional time then spent on discharge activities      Discharge Medications:  See after visit summary for reconciled discharge medications provided to patient and family        ** Please Note: This note has been constructed using a voice recognition system **

## 2017-12-16 NOTE — PLAN OF CARE
Problem: Nutrition/Hydration-ADULT  Goal: Nutrient/Hydration intake appropriate for improving, restoring or maintaining nutritional needs  Monitor and assess patient's nutrition/hydration status for malnutrition (ex- brittle hair, bruises, dry skin, pale skin and conjunctiva, muscle wasting, smooth red tongue, and disorientation)  Collaborate with interdisciplinary team and initiate plan and interventions as ordered  Monitor patient's weight and dietary intake as ordered or per policy  Utilize nutrition screening tool and intervene per policy  Determine patient's food preferences and provide high-protein, high-caloric foods as appropriate  INTERVENTIONS:  - Monitor oral intake, urinary output, labs, and treatment plans  - Assess nutrition and hydration status and recommend course of action  - Evaluate amount of meals eaten  - Assist patient with eating if necessary   - Allow adequate time for meals  - Recommend/ encourage appropriate diets, oral nutritional supplements, and vitamin/mineral supplements  - Order, calculate, and assess calorie counts as needed  - Recommend, monitor, and adjust tube feedings and TPN/PPN based on assessed needs  - Assess need for intravenous fluids  - Provide specific nutrition/hydration education as appropriate  - Include patient/family/caregiver in decisions related to nutrition   Outcome: Progressing      Problem: SAFETY ADULT  Goal: Patient will remain free of falls  INTERVENTIONS:  - Assess patient frequently for physical needs  -  Identify cognitive and physical deficits and behaviors that affect risk of falls    -  [place] fall precautions as indicated by assessment   - Educate patient/family on patient safety including physical limitations  - Instruct patient to call for assistance with activity based on assessment  - Modify environment to reduce risk of injury  - Consider OT/PT consult to assist with strengthening/mobility    Outcome: Progressing    Goal: Maintain or return to baseline ADL function  INTERVENTIONS:  -  Assess patient's ability to carry out ADLs; assess patient's baseline for ADL function and identify physical deficits which impact ability to perform ADLs (bathing, care of mouth/teeth, toileting, grooming, dressing, etc )  - Assess/evaluate cause of self-care deficits   - Assess range of motion  - Assess patient's mobility; develop plan if impaired  - Assess patient's need for assistive devices and provide as appropriate  - Encourage maximum independence but intervene and supervise when necessary  ¯ Involve family in performance of ADLs  ¯ Assess for home care needs following discharge   ¯ Request OT consult to assist with ADL evaluation and planning for discharge  ¯ Provide patient education as appropriate   Outcome: Progressing    Goal: Maintain or return mobility status to optimal level  INTERVENTIONS:  - Assess patient's baseline mobility status (ambulation, transfers, stairs, etc )    - Identify cognitive and physical deficits and behaviors that affect mobility  - Identify mobility aids required to assist with transfers and/or ambulation (gait belt, sit-to-stand, lift, walker, cane, etc )  - [place] fall precautions as indicated by assessment  - Record patient progress and toleration of activity level on Mobility SBAR; progress patient to next Phase/Stage  - Instruct patient to call for assistance with activity based on assessment  - Request Rehabilitation consult to assist with strengthening/weightbearing, etc    Outcome: Progressing      Problem: Prexisting or High Potential for Compromised Skin Integrity  Goal: Skin integrity is maintained or improved  INTERVENTIONS:  - Identify patients at risk for skin breakdown  - Assess and monitor skin integrity  - Assess and monitor nutrition and hydration status  - Monitor labs (i e  albumin)  - Assess for incontinence   - Turn and reposition patient  - Assist with mobility/ambulation  - Relieve pressure over bony prominences  - Avoid friction and shearing  - Provide appropriate hygiene as needed including keeping skin clean and dry  - Evaluate need for skin moisturizer/barrier cream  - Collaborate with interdisciplinary team (i e  Nutrition, Rehabilitation, etc )   - Patient/family teaching   Outcome: Progressing      Problem: Potential for Falls  Goal: Patient will remain free of falls  INTERVENTIONS:  - Assess patient frequently for physical needs  -  Identify cognitive and physical deficits and behaviors that affect risk of falls    -  [place] fall precautions as indicated by assessment   - Educate patient/family on patient safety including physical limitations  - Instruct patient to call for assistance with activity based on assessment  - Modify environment to reduce risk of injury  - Consider OT/PT consult to assist with strengthening/mobility    Outcome: Progressing

## 2017-12-18 LAB
BACTERIA BLD CULT: NORMAL
BACTERIA BLD CULT: NORMAL

## 2018-06-05 ENCOUNTER — TRANSCRIBE ORDERS (OUTPATIENT)
Dept: LAB | Facility: CLINIC | Age: 83
End: 2018-06-05

## 2018-06-05 ENCOUNTER — APPOINTMENT (OUTPATIENT)
Dept: LAB | Facility: CLINIC | Age: 83
End: 2018-06-05
Payer: COMMERCIAL

## 2018-06-05 DIAGNOSIS — D64.9 ANEMIA, UNSPECIFIED TYPE: ICD-10-CM

## 2018-06-05 DIAGNOSIS — D64.9 ANEMIA, UNSPECIFIED TYPE: Primary | ICD-10-CM

## 2018-06-05 DIAGNOSIS — E55.9 AVITAMINOSIS D: ICD-10-CM

## 2018-06-05 LAB
25(OH)D3 SERPL-MCNC: 19.3 NG/ML (ref 30–100)
ALBUMIN SERPL BCP-MCNC: 3.6 G/DL (ref 3.5–5)
ALP SERPL-CCNC: 77 U/L (ref 46–116)
ALT SERPL W P-5'-P-CCNC: 23 U/L (ref 12–78)
ANION GAP SERPL CALCULATED.3IONS-SCNC: 5 MMOL/L (ref 4–13)
AST SERPL W P-5'-P-CCNC: 18 U/L (ref 5–45)
BASOPHILS # BLD AUTO: 0.03 THOUSANDS/ΜL (ref 0–0.1)
BASOPHILS NFR BLD AUTO: 1 % (ref 0–1)
BILIRUB SERPL-MCNC: 0.67 MG/DL (ref 0.2–1)
BUN SERPL-MCNC: 17 MG/DL (ref 5–25)
CALCIUM SERPL-MCNC: 8.6 MG/DL (ref 8.3–10.1)
CHLORIDE SERPL-SCNC: 108 MMOL/L (ref 100–108)
CHOLEST SERPL-MCNC: 175 MG/DL (ref 50–200)
CO2 SERPL-SCNC: 28 MMOL/L (ref 21–32)
CREAT SERPL-MCNC: 0.84 MG/DL (ref 0.6–1.3)
EOSINOPHIL # BLD AUTO: 0.08 THOUSAND/ΜL (ref 0–0.61)
EOSINOPHIL NFR BLD AUTO: 2 % (ref 0–6)
ERYTHROCYTE [DISTWIDTH] IN BLOOD BY AUTOMATED COUNT: 13.5 % (ref 11.6–15.1)
EST. AVERAGE GLUCOSE BLD GHB EST-MCNC: 126 MG/DL
FERRITIN SERPL-MCNC: 20 NG/ML (ref 8–388)
FOLATE SERPL-MCNC: >20 NG/ML (ref 3.1–17.5)
GFR SERPL CREATININE-BSD FRML MDRD: 61 ML/MIN/1.73SQ M
GLUCOSE P FAST SERPL-MCNC: 85 MG/DL (ref 65–99)
HBA1C MFR BLD: 6 % (ref 4.2–6.3)
HCT VFR BLD AUTO: 43.4 % (ref 34.8–46.1)
HDLC SERPL-MCNC: 39 MG/DL (ref 40–60)
HGB BLD-MCNC: 13.8 G/DL (ref 11.5–15.4)
IMM GRANULOCYTES # BLD AUTO: 0.01 THOUSAND/UL (ref 0–0.2)
IMM GRANULOCYTES NFR BLD AUTO: 0 % (ref 0–2)
IRON SERPL-MCNC: 106 UG/DL (ref 50–170)
LDLC SERPL CALC-MCNC: 107 MG/DL (ref 0–100)
LYMPHOCYTES # BLD AUTO: 1.62 THOUSANDS/ΜL (ref 0.6–4.47)
LYMPHOCYTES NFR BLD AUTO: 31 % (ref 14–44)
MAGNESIUM SERPL-MCNC: 2.4 MG/DL (ref 1.6–2.6)
MCH RBC QN AUTO: 30.6 PG (ref 26.8–34.3)
MCHC RBC AUTO-ENTMCNC: 31.8 G/DL (ref 31.4–37.4)
MCV RBC AUTO: 96 FL (ref 82–98)
MONOCYTES # BLD AUTO: 0.34 THOUSAND/ΜL (ref 0.17–1.22)
MONOCYTES NFR BLD AUTO: 6 % (ref 4–12)
NEUTROPHILS # BLD AUTO: 3.23 THOUSANDS/ΜL (ref 1.85–7.62)
NEUTS SEG NFR BLD AUTO: 60 % (ref 43–75)
NONHDLC SERPL-MCNC: 136 MG/DL
NRBC BLD AUTO-RTO: 0 /100 WBCS
PLATELET # BLD AUTO: 215 THOUSANDS/UL (ref 149–390)
PMV BLD AUTO: 10.6 FL (ref 8.9–12.7)
POTASSIUM SERPL-SCNC: 4.1 MMOL/L (ref 3.5–5.3)
PROT SERPL-MCNC: 7 G/DL (ref 6.4–8.2)
RBC # BLD AUTO: 4.51 MILLION/UL (ref 3.81–5.12)
SODIUM SERPL-SCNC: 141 MMOL/L (ref 136–145)
TIBC SERPL-MCNC: 375 UG/DL (ref 250–450)
TRIGL SERPL-MCNC: 144 MG/DL
TSH SERPL DL<=0.05 MIU/L-ACNC: 2.19 UIU/ML (ref 0.36–3.74)
URATE SERPL-MCNC: 4.6 MG/DL (ref 2–6.8)
VIT B12 SERPL-MCNC: 745 PG/ML (ref 100–900)
WBC # BLD AUTO: 5.31 THOUSAND/UL (ref 4.31–10.16)

## 2018-06-05 PROCEDURE — 83550 IRON BINDING TEST: CPT

## 2018-06-05 PROCEDURE — 84443 ASSAY THYROID STIM HORMONE: CPT

## 2018-06-05 PROCEDURE — 85025 COMPLETE CBC W/AUTO DIFF WBC: CPT

## 2018-06-05 PROCEDURE — 83735 ASSAY OF MAGNESIUM: CPT

## 2018-06-05 PROCEDURE — 84550 ASSAY OF BLOOD/URIC ACID: CPT

## 2018-06-05 PROCEDURE — 82728 ASSAY OF FERRITIN: CPT

## 2018-06-05 PROCEDURE — 82306 VITAMIN D 25 HYDROXY: CPT

## 2018-06-05 PROCEDURE — 82607 VITAMIN B-12: CPT

## 2018-06-05 PROCEDURE — 36415 COLL VENOUS BLD VENIPUNCTURE: CPT

## 2018-06-05 PROCEDURE — 82746 ASSAY OF FOLIC ACID SERUM: CPT

## 2018-06-05 PROCEDURE — 80053 COMPREHEN METABOLIC PANEL: CPT

## 2018-06-05 PROCEDURE — 80061 LIPID PANEL: CPT

## 2018-06-05 PROCEDURE — 83540 ASSAY OF IRON: CPT

## 2018-06-05 PROCEDURE — 83036 HEMOGLOBIN GLYCOSYLATED A1C: CPT

## 2018-12-20 ENCOUNTER — OFFICE VISIT (OUTPATIENT)
Dept: URGENT CARE | Facility: CLINIC | Age: 83
End: 2018-12-20
Payer: COMMERCIAL

## 2018-12-20 VITALS
DIASTOLIC BLOOD PRESSURE: 70 MMHG | RESPIRATION RATE: 18 BRPM | SYSTOLIC BLOOD PRESSURE: 155 MMHG | OXYGEN SATURATION: 100 % | TEMPERATURE: 97.2 F | HEART RATE: 70 BPM

## 2018-12-20 DIAGNOSIS — L24.0 CONTACT DERMATITIS DUE TO DETERGENT, UNSPECIFIED CONTACT DERMATITIS TYPE: Primary | ICD-10-CM

## 2018-12-20 PROCEDURE — 99213 OFFICE O/P EST LOW 20 MIN: CPT | Performed by: FAMILY MEDICINE

## 2018-12-20 RX ORDER — PREDNISONE 50 MG/1
50 TABLET ORAL DAILY
Qty: 5 TABLET | Refills: 0 | Status: SHIPPED | OUTPATIENT
Start: 2018-12-20 | End: 2018-12-25

## 2018-12-20 NOTE — PROGRESS NOTES
[facility]        NAME: Jerry Martines is a 80 y o  female  : 1927    MRN: [id number]  DATE: 2018  TIME: 2:13 PM    Assessment and Plan   Contact dermatitis due to detergent, unspecified contact dermatitis type [L24 0]  1  Contact dermatitis due to detergent, unspecified contact dermatitis type  predniSONE 50 mg tablet    hydrocortisone 2 5 % ointment         Patient Instructions   Prescriptions sent to pharmacy for prednisone and hydrocortisone ointment  Stop taking previously prescribed prednisone 10 mg and take new prescription for prednisone 50 mg x5 days  After the 5 days then return to your normally prescribed prednisone 10 mg  Benadryl 25 mg every 6 hr for rash and itching  Continue to take previously prescribed Pepcid which is a histamine blocker  Stop using new soap  Use a soap without dyes or perfumes such as Dove or [place]  Do not apply new lotions or creams  If you developed lip, tongue, throat swelling or difficulty swallowing or breathing immediately proceed to the emergency room  Follow up with PCP in 3-5 days  Proceed to  ER if symptoms worsen  Chief Complaint     Chief Complaint   Patient presents with    Urticaria     started new body wash and rash started today          History of Present Illness   The patient is a 22-year-old female who presents with a rash on her arms, legs chest and torso for several days  She states that she did start recently using a new soap and noticed the rash afterwards  The rash is itchy  Negative drainage from the rash  Negative fever or chills  Negative nausea or vomiting  She denies any swelling of her lips, tongue or throat  She denies difficulty swallowing or breathing  No voice change  She has no known drug or food allergies  She states that she does normally take prednisone 10 mg for arthritis  HPI    Review of Systems   Review of Systems   Constitutional: Negative for chills and fever  HENT: Negative  Negative for sore throat, trouble swallowing and voice change  Respiratory: Negative for shortness of breath, wheezing and stridor  Cardiovascular: Negative for chest pain, palpitations and leg swelling  Musculoskeletal: Negative for arthralgias, joint swelling and myalgias  Skin: Positive for rash  Neurological: Negative for dizziness, light-headedness and headaches  All other systems reviewed and are negative  Current Medications       Current Outpatient Prescriptions:     albuterol (PROVENTIL HFA,VENTOLIN HFA) 90 mcg/act inhaler, Inhale 2 puffs every 6 (six) hours as needed for wheezing, Disp: , Rfl:     colchicine (COLCRYS) 0 6 mg tablet, Take 0 6 mg by mouth daily  , Disp: , Rfl:     cyanocobalamin (VITAMIN B-12) 1,000 mcg tablet, Take 1,000 mcg by mouth daily  , Disp: , Rfl:     dexlansoprazole (DEXILANT) 60 MG capsule, Take 60 mg by mouth daily  , Disp: , Rfl:     donepezil (ARICEPT) 5 mg tablet, Take 1 tablet by mouth daily, Disp: , Rfl:     gabapentin (NEURONTIN) 100 mg capsule, Take 100 mg by mouth 4 (four) times a day, Disp: , Rfl:     iron polysaccharides (NIFEREX) 150 mg capsule, Take 150 mg by mouth 2 (two) times a day, Disp: , Rfl:     megestrol (MEGACE) 40 mg tablet, Take 1 tablet by mouth 4 (four) times a day, Disp: 30 tablet, Rfl: 0    montelukast (SINGULAIR) 10 mg tablet, Take 10 mg by mouth daily  , Disp: , Rfl:     oxyCODONE (OXY-IR) 5 MG capsule, Take 1 capsule by mouth every 6 (six) hours as needed for severe pain for up to 20 doses Max Daily Amount: 20 mg, Disp: 20 capsule, Rfl: 0    oxyCODONE-acetaminophen (PERCOCET) 5-325 mg per tablet, Take 1 tablet by mouth every 6 (six) hours as needed for moderate pain , Disp: , Rfl:     pravastatin (PRAVACHOL) 10 mg tablet, Take 10 mg by mouth daily  , Disp: , Rfl:     predniSONE 10 mg tablet, Take 1 tablet by mouth daily, Disp: 10 tablet, Rfl: 0    famotidine (PEPCID) 20 mg tablet, Take 1 tablet by mouth 2 (two) times a day for 4 days  (Patient taking differently: Take 20 mg by mouth daily  ), Disp: 8 tablet, Rfl: 0    hydrocortisone 2 5 % ointment, Apply topically 2 (two) times a day, Disp: 30 g, Rfl: 0    predniSONE 50 mg tablet, Take 1 tablet (50 mg total) by mouth daily for 5 days, Disp: 5 tablet, Rfl: 0    Current Allergies     Allergies as of 12/20/2018    (No Known Allergies)            The following portions of the patient's history were reviewed and updated as appropriate: allergies, current medications, past family history, past medical history, past social history, past surgical history and problem list      Past Medical History:   Diagnosis Date    Arthritis     Asthma     Cancer ([address]     Gout     Myocardial infarction ([address]     Neuropathy        Past Surgical History:   Procedure Laterality Date    APPENDECTOMY      HYSTERECTOMY         No family history on file  Medications have been verified  Objective   /70   Pulse 70   Temp (!) 97 2 °F (36 2 °C)   Resp 18   SpO2 100%        Physical Exam     Physical Exam   Constitutional: She appears well-developed and well-nourished  No distress  HENT:   Head: Normocephalic and atraumatic  Mouth/Throat: Oropharynx is clear and moist and mucous membranes are normal  Mucous membranes are not pale, not dry and not cyanotic  No uvula swelling  No oropharyngeal exudate, posterior oropharyngeal edema, posterior oropharyngeal erythema or tonsillar abscesses  Eyes: Pupils are equal, round, and reactive to light  Conjunctivae and EOM are normal  Right eye exhibits no discharge  Left eye exhibits no discharge  No scleral icterus  Cardiovascular: Normal rate, regular rhythm and normal heart sounds  No murmur heard  Pulmonary/Chest: Effort normal and breath sounds normal  No respiratory distress  Neurological: She is alert  Skin: Skin is warm and dry  Rash noted  Rash is urticarial  She is not diaphoretic     The patient has urticarial hives on her arms, legs, chest, torso and back  Negative drainage from the rash  Positive signs of excoriation  Nursing note and vitals reviewed

## 2018-12-20 NOTE — PATIENT INSTRUCTIONS
Prescriptions sent to pharmacy for prednisone and hydrocortisone ointment  Stop taking previously prescribed prednisone 10 mg and take new prescription for prednisone 50 mg x5 days  After the 5 days then return to your normally prescribed prednisone 10 mg  Benadryl 25 mg every 6 hr for rash and itching  Continue to take previously prescribed Pepcid which is a histamine blocker  Stop using new soap  Use a soap without dyes or perfumes such as Dove or [place]  Do not apply new lotions or creams  If you developed lip, tongue, throat swelling or difficulty swallowing or breathing immediately proceed to the emergency room  Follow up with PCP in 3-5 days  Proceed to  ER if symptoms worsen  Contact Dermatitis   AMBULATORY CARE:   Contact dermatitis  is a rash  It develops when you touch something that irritates your skin or causes an allergic reaction  Common signs and symptoms include the following:   · Red, swollen, painful rash           · Skin that itches, stings, or burns    · Dry, scaly, or crusty skin patches    · Bumps or blisters    · Fluid draining from blisters  Call 911 for any of the following:   · You have sudden trouble breathing  · Your throat swells and you have trouble eating  · Your face is swollen  Contact your healthcare provider if:   · You have a fever  · Your blisters are draining pus  · Your rash spreads or does not get better, even after treatment  · You have questions or concerns about your condition or care  Treatment for contact dermatitis  involves removing any irritants or allergens that cause your rash  You may also need medicines to decrease itching and swelling  They will be given as a topical medicine to apply to your rash or as a pill  Manage contact dermatitis:   · Take short baths or showers in cool water  Use mild soap or soap-free cleansers  Add oatmeal, baking soda, or cornstarch to the bath water to help decrease skin irritation      · Avoid skin irritants , such as makeup, hair products, soaps, and cleansers  Use products that do not contain perfume or dye  · Apply a cool compress to your rash  This will help soothe your skin  · Keep your skin moist   Rub unscented cream or lotion on your skin to prevent dryness and itching  Do this right after a bath or shower when your skin is still damp  Follow up with your healthcare provider as directed:  Write down your questions so you remember to ask them during your visits  © 2017 [facility] [employer] Information is for End User's use only and may not be sold, redistributed or otherwise used for commercial purposes  All illustrations and images included in CareNotes® are the copyrighted property of [employer]  or Magdy Barahona  The above information is an  only  It is not intended as medical advice for individual conditions or treatments  Talk to your doctor, nurse or pharmacist before following any medical regimen to see if it is safe and effective for you

## 2019-01-24 ENCOUNTER — OFFICE VISIT (OUTPATIENT)
Dept: URGENT CARE | Facility: CLINIC | Age: 84
End: 2019-01-24
Payer: COMMERCIAL

## 2019-01-24 VITALS
RESPIRATION RATE: 20 BRPM | WEIGHT: 127 LBS | HEART RATE: 92 BPM | DIASTOLIC BLOOD PRESSURE: 93 MMHG | OXYGEN SATURATION: 99 % | TEMPERATURE: 97.6 F | HEIGHT: 62 IN | SYSTOLIC BLOOD PRESSURE: 158 MMHG | BODY MASS INDEX: 23.37 KG/M2

## 2019-01-24 DIAGNOSIS — T78.40XA ALLERGIC REACTION, INITIAL ENCOUNTER: Primary | ICD-10-CM

## 2019-01-24 PROCEDURE — 99213 OFFICE O/P EST LOW 20 MIN: CPT | Performed by: PHYSICIAN ASSISTANT

## 2019-01-24 RX ORDER — PREDNISONE 50 MG/1
50 TABLET ORAL DAILY
Qty: 5 TABLET | Refills: 0 | Status: SHIPPED | OUTPATIENT
Start: 2019-01-24 | End: 2019-01-29

## 2019-01-24 NOTE — PROGRESS NOTES
[facility]        NAME: Laury Carnes is a 80 y o  female  : 1927    MRN: [id number]  DATE: 2019  TIME: 7:01 PM    Assessment and Plan   Allergic reaction, initial encounter Danny Gerardo  1  Allergic reaction, initial encounter  predniSONE 50 mg tablet    Ambulatory referral to Allergy         Patient Instructions   The patient continues to have recurrent allergic reactions to an unknown irritant  Prescription sent to the pharmacy for prednisone-use as directed  Benadryl 25 mg every 6 hours for rash and itching  Continue to take your previously prescribed Pepcid (H2 blocker)  Referral placed for an allergist   I had a long discussion with the patient and her family member regarding the importance of following up with an allergist for recurrent reactions  If you developed difficulty breathing, tongue, throat or lip swelling immediately proceed to the emergency room  Follow up with PCP in 3-5 days  Proceed to  ER if symptoms worsen  Chief Complaint   No chief complaint on file  History of Present Illness   The patient is a 19-year-old female who presents with itching and a rash that started today  The patient has a history of recurrent allergic reactions  She was in our care now office last month for a similar reaction that she felt was secondary to a change in her detergent  On this occasion she states that she may have used a new soap but is unsure  She has a rash on her upper extremities, neck and entire torso  The rash is pruritic  Negative open wound  She denies lip, tongue or throat swelling  No difficulty breathing  Negative stridor  She was advised to follow up with an allergist at her last visit, however, she states that she did not do so  HPI    Review of Systems   Review of Systems   Constitutional: Negative for chills and fever  HENT: Positive for trouble swallowing (Chronic)  Negative for facial swelling  Skin: Positive for rash  Neurological: Negative for dizziness, tremors, seizures, syncope, facial asymmetry, speech difficulty, weakness, light-headedness, numbness and headaches  Psychiatric/Behavioral: Positive for agitation  All other systems reviewed and are negative  Current Medications       Current Outpatient Prescriptions:     albuterol (PROVENTIL HFA,VENTOLIN HFA) 90 mcg/act inhaler, Inhale 2 puffs every 6 (six) hours as needed for wheezing, Disp: , Rfl:     colchicine (COLCRYS) 0 6 mg tablet, Take 0 6 mg by mouth daily  , Disp: , Rfl:     cyanocobalamin (VITAMIN B-12) 1,000 mcg tablet, Take 1,000 mcg by mouth daily  , Disp: , Rfl:     dexlansoprazole (DEXILANT) 60 MG capsule, Take 60 mg by mouth daily  , Disp: , Rfl:     donepezil (ARICEPT) 5 mg tablet, Take 1 tablet by mouth daily, Disp: , Rfl:     famotidine (PEPCID) 20 mg tablet, Take 1 tablet by mouth 2 (two) times a day for 4 days  (Patient taking differently: Take 20 mg by mouth daily  ), Disp: 8 tablet, Rfl: 0    gabapentin (NEURONTIN) 100 mg capsule, Take 100 mg by mouth 4 (four) times a day, Disp: , Rfl:     hydrocortisone 2 5 % ointment, Apply topically 2 (two) times a day, Disp: 30 g, Rfl: 0    iron polysaccharides (NIFEREX) 150 mg capsule, Take 150 mg by mouth 2 (two) times a day, Disp: , Rfl:     megestrol (MEGACE) 40 mg tablet, Take 1 tablet by mouth 4 (four) times a day, Disp: 30 tablet, Rfl: 0    montelukast (SINGULAIR) 10 mg tablet, Take 10 mg by mouth daily  , Disp: , Rfl:     oxyCODONE (OXY-IR) 5 MG capsule, Take 1 capsule by mouth every 6 (six) hours as needed for severe pain for up to 20 doses Max Daily Amount: 20 mg, Disp: 20 capsule, Rfl: 0    oxyCODONE-acetaminophen (PERCOCET) 5-325 mg per tablet, Take 1 tablet by mouth every 6 (six) hours as needed for moderate pain , Disp: , Rfl:     pravastatin (PRAVACHOL) 10 mg tablet, Take 10 mg by mouth daily  , Disp: , Rfl:     predniSONE 50 mg tablet, Take 1 tablet (50 mg total) by mouth daily for 5 days, Disp: 5 tablet, Rfl: 0    Current Allergies     Allergies as of 01/24/2019    (No Known Allergies)            The following portions of the patient's history were reviewed and updated as appropriate: allergies, current medications, past family history, past medical history, past social history, past surgical history and problem list      Past Medical History:   Diagnosis Date    Arthritis     Asthma     [facility] [address] [address] [facility]     Gout     Myocardial infarction ([facility] )     Neuropathy        Past Surgical History:   Procedure Laterality Date    APPENDECTOMY      HYSTERECTOMY         No family history on file  Medications have been verified  Objective   There were no vitals taken for this visit  Physical Exam     Physical Exam   Constitutional: She is oriented to person, place, and time  She appears well-developed and well-nourished  Non-toxic appearance  She does not have a sickly appearance  She does not appear ill  No distress  HENT:   Head: Normocephalic and atraumatic  Mouth/Throat: Mucous membranes are normal  Mucous membranes are not pale, not dry and not cyanotic  No oropharyngeal exudate, posterior oropharyngeal edema, posterior oropharyngeal erythema or tonsillar abscesses  Cardiovascular: Normal rate, regular rhythm and normal heart sounds  Exam reveals no gallop and no friction rub  No murmur heard  Pulmonary/Chest: Effort normal and breath sounds normal  No respiratory distress  She has no wheezes  She has no rales  She exhibits no tenderness  Musculoskeletal: She exhibits no edema  Neurological: She is alert and oriented to person, place, and time  Skin: Skin is warm and dry  Rash noted  Rash is urticarial  She is not diaphoretic  No pallor  Urticarial hives on the patient's neck, torso, abdomen and upper extremities  Positive excoriation  Psychiatric: Her mood appears anxious  Nursing note and vitals reviewed

## 2019-01-24 NOTE — PATIENT INSTRUCTIONS
The patient continues to have recurrent allergic reactions to an unknown irritant  Prescription sent to the pharmacy for prednisone-use as directed  Benadryl 25 mg every 6 hours for rash and itching  Continue to take your previously prescribed Pepcid (H2 blocker)  Referral placed for an allergist   I had a long discussion with the patient and her family member regarding the importance of following up with an allergist for recurrent reactions  If you developed difficulty breathing, tongue, throat or lip swelling immediately proceed to the emergency room  Follow up with PCP in 3-5 days  Proceed to  ER if symptoms worsen  General Allergic Reaction   AMBULATORY CARE:   A general allergic reaction  is your body's response to an allergen  Allergens include medicines, food, insect stings, animal dander, mold, latex, chemicals, and dust mites  Pollen from trees, grass, and weeds can also cause an allergic reaction  Seek care immediately if:   · You have a skin rash, hives, swelling, or itching that gets worse  · You have trouble breathing, shortness of breath, wheezing, or coughing  · Your throat tightens, or your lips or tongue swell  · You have trouble swallowing or speaking  · You have dizziness, lightheadedness, fainting, or confusion  · You have nausea, vomiting, diarrhea, or abdominal cramps  · You have chest pain or tightness  Contact your healthcare provider if:   · You have questions or concerns about your condition or care  Treatment for a general allergic reaction  may include medicines to relieve certain allergy symptoms such as itching, sneezing, and swelling  You may take them as a pill or use drops in your nose or eyes  Topical treatments may be given to put directly on your skin to help decrease itching or swelling  Manage allergic reactions:   · Avoid the allergen  that you think may have caused your allergic reaction       · Use cold compresses  on your skin or eyes if they were affected by the allergic reaction  Cold compresses may help to soothe your skin or eyes  · Rinse your nasal passages  with a saline solution  Daily rinsing may help clear your nose of allergens  · Do not smoke  Your allergy symptoms may decrease if you are not around smoke  Nicotine and other chemicals in cigarettes and cigars can also cause lung damage  Ask your healthcare provider for information if you currently smoke and need help to quit  E-cigarettes or smokeless tobacco still contain nicotine  Talk to your healthcare provider before you use these products  Follow up with your healthcare provider as directed:  Write down your questions so you remember to ask them during your visits  © 2017 [facility] Information is for End User's use only and may not be sold, redistributed or otherwise used for commercial purposes  All illustrations and images included in CareNotes® are the copyrighted property of [employer]  or Magdy Barahona  The above information is an  only  It is not intended as medical advice for individual conditions or treatments  Talk to your doctor, nurse or pharmacist before following any medical regimen to see if it is safe and effective for you

## 2019-03-04 ENCOUNTER — TRANSCRIBE ORDERS (OUTPATIENT)
Dept: LAB | Facility: CLINIC | Age: 84
End: 2019-03-04

## 2019-03-04 ENCOUNTER — APPOINTMENT (OUTPATIENT)
Dept: LAB | Facility: CLINIC | Age: 84
End: 2019-03-04
Payer: COMMERCIAL

## 2019-03-04 DIAGNOSIS — Z79.899 ENCOUNTER FOR LONG-TERM (CURRENT) USE OF HIGH-RISK MEDICATION: ICD-10-CM

## 2019-03-04 DIAGNOSIS — L50.1 IDIOPATHIC URTICARIA: Primary | ICD-10-CM

## 2019-03-04 DIAGNOSIS — L50.1 IDIOPATHIC URTICARIA: ICD-10-CM

## 2019-03-04 LAB
25(OH)D3 SERPL-MCNC: 36.7 NG/ML (ref 30–100)
ALBUMIN SERPL BCP-MCNC: 3.7 G/DL (ref 3.5–5)
ALP SERPL-CCNC: 77 U/L (ref 46–116)
ALT SERPL W P-5'-P-CCNC: 24 U/L (ref 12–78)
ANION GAP SERPL CALCULATED.3IONS-SCNC: 3 MMOL/L (ref 4–13)
AST SERPL W P-5'-P-CCNC: 17 U/L (ref 5–45)
BASOPHILS # BLD AUTO: 0.03 THOUSANDS/ΜL (ref 0–0.1)
BASOPHILS NFR BLD AUTO: 1 % (ref 0–1)
BILIRUB DIRECT SERPL-MCNC: 0.11 MG/DL (ref 0–0.2)
BILIRUB SERPL-MCNC: 0.52 MG/DL (ref 0.2–1)
BUN SERPL-MCNC: 13 MG/DL (ref 5–25)
C3 SERPL-MCNC: 114 MG/DL (ref 90–180)
C4 SERPL-MCNC: 27 MG/DL (ref 10–40)
CALCIUM SERPL-MCNC: 8.8 MG/DL (ref 8.3–10.1)
CHLORIDE SERPL-SCNC: 108 MMOL/L (ref 100–108)
CO2 SERPL-SCNC: 28 MMOL/L (ref 21–32)
CREAT SERPL-MCNC: 0.92 MG/DL (ref 0.6–1.3)
CRP SERPL QL: <3 MG/L
EOSINOPHIL # BLD AUTO: 0.07 THOUSAND/ΜL (ref 0–0.61)
EOSINOPHIL NFR BLD AUTO: 1 % (ref 0–6)
ERYTHROCYTE [DISTWIDTH] IN BLOOD BY AUTOMATED COUNT: 13 % (ref 11.6–15.1)
ERYTHROCYTE [SEDIMENTATION RATE] IN BLOOD: 10 MM/HOUR (ref 0–20)
GFR SERPL CREATININE-BSD FRML MDRD: 55 ML/MIN/1.73SQ M
GLUCOSE SERPL-MCNC: 114 MG/DL (ref 65–140)
HCT VFR BLD AUTO: 39.3 % (ref 34.8–46.1)
HGB BLD-MCNC: 12.6 G/DL (ref 11.5–15.4)
IMM GRANULOCYTES # BLD AUTO: 0.01 THOUSAND/UL (ref 0–0.2)
IMM GRANULOCYTES NFR BLD AUTO: 0 % (ref 0–2)
LYMPHOCYTES # BLD AUTO: 1.62 THOUSANDS/ΜL (ref 0.6–4.47)
LYMPHOCYTES NFR BLD AUTO: 29 % (ref 14–44)
MCH RBC QN AUTO: 31.1 PG (ref 26.8–34.3)
MCHC RBC AUTO-ENTMCNC: 32.1 G/DL (ref 31.4–37.4)
MCV RBC AUTO: 97 FL (ref 82–98)
MONOCYTES # BLD AUTO: 0.37 THOUSAND/ΜL (ref 0.17–1.22)
MONOCYTES NFR BLD AUTO: 7 % (ref 4–12)
NEUTROPHILS # BLD AUTO: 3.53 THOUSANDS/ΜL (ref 1.85–7.62)
NEUTS SEG NFR BLD AUTO: 62 % (ref 43–75)
NRBC BLD AUTO-RTO: 0 /100 WBCS
PHOSPHATE SERPL-MCNC: 3.4 MG/DL (ref 2.3–4.1)
PLATELET # BLD AUTO: 199 THOUSANDS/UL (ref 149–390)
PMV BLD AUTO: 10.6 FL (ref 8.9–12.7)
POTASSIUM SERPL-SCNC: 4.1 MMOL/L (ref 3.5–5.3)
PROT SERPL-MCNC: 6.6 G/DL (ref 6.4–8.2)
RBC # BLD AUTO: 4.05 MILLION/UL (ref 3.81–5.12)
SODIUM SERPL-SCNC: 139 MMOL/L (ref 136–145)
T3 SERPL-MCNC: 1 NG/ML (ref 0.6–1.8)
T4 SERPL-MCNC: 7.4 UG/DL (ref 4.7–13.3)
WBC # BLD AUTO: 5.63 THOUSAND/UL (ref 4.31–10.16)

## 2019-03-04 PROCEDURE — 86162 COMPLEMENT TOTAL (CH50): CPT

## 2019-03-04 PROCEDURE — 86200 CCP ANTIBODY: CPT

## 2019-03-04 PROCEDURE — 85025 COMPLETE CBC W/AUTO DIFF WBC: CPT

## 2019-03-04 PROCEDURE — 84100 ASSAY OF PHOSPHORUS: CPT

## 2019-03-04 PROCEDURE — 86376 MICROSOMAL ANTIBODY EACH: CPT

## 2019-03-04 PROCEDURE — 86430 RHEUMATOID FACTOR TEST QUAL: CPT

## 2019-03-04 PROCEDURE — 36415 COLL VENOUS BLD VENIPUNCTURE: CPT

## 2019-03-04 PROCEDURE — 84432 ASSAY OF THYROGLOBULIN: CPT

## 2019-03-04 PROCEDURE — 86800 THYROGLOBULIN ANTIBODY: CPT

## 2019-03-04 PROCEDURE — 86140 C-REACTIVE PROTEIN: CPT

## 2019-03-04 PROCEDURE — 84480 ASSAY TRIIODOTHYRONINE (T3): CPT

## 2019-03-04 PROCEDURE — 86038 ANTINUCLEAR ANTIBODIES: CPT

## 2019-03-04 PROCEDURE — 82306 VITAMIN D 25 HYDROXY: CPT

## 2019-03-04 PROCEDURE — 86160 COMPLEMENT ANTIGEN: CPT

## 2019-03-04 PROCEDURE — 80053 COMPREHEN METABOLIC PANEL: CPT

## 2019-03-04 PROCEDURE — 84436 ASSAY OF TOTAL THYROXINE: CPT

## 2019-03-04 PROCEDURE — 84165 PROTEIN E-PHORESIS SERUM: CPT | Performed by: PATHOLOGY

## 2019-03-04 PROCEDURE — 84165 PROTEIN E-PHORESIS SERUM: CPT

## 2019-03-04 PROCEDURE — 82248 BILIRUBIN DIRECT: CPT

## 2019-03-04 PROCEDURE — 85652 RBC SED RATE AUTOMATED: CPT

## 2019-03-04 PROCEDURE — 86431 RHEUMATOID FACTOR QUANT: CPT

## 2019-03-05 LAB
CRYOGLOB RF SER-ACNC: ABNORMAL [IU]/ML
RHEUMATOID FACT SER QL LA: POSITIVE
THYROGLOB AB SERPL-ACNC: <1 IU/ML (ref 0–0.9)
THYROGLOB SERPL-MCNC: 29.6 NG/ML (ref 1.5–38.5)
THYROPEROXIDASE AB SERPL-ACNC: 8 IU/ML (ref 0–34)

## 2019-03-06 LAB
ALBUMIN SERPL ELPH-MCNC: 4.09 G/DL (ref 3.5–5)
ALBUMIN SERPL ELPH-MCNC: 61 % (ref 52–65)
ALPHA1 GLOB SERPL ELPH-MCNC: 0.29 G/DL (ref 0.1–0.4)
ALPHA1 GLOB SERPL ELPH-MCNC: 4.3 % (ref 2.5–5)
ALPHA2 GLOB SERPL ELPH-MCNC: 0.8 G/DL (ref 0.4–1.2)
ALPHA2 GLOB SERPL ELPH-MCNC: 12 % (ref 7–13)
BETA GLOB ABNORMAL SERPL ELPH-MCNC: 0.45 G/DL (ref 0.4–0.8)
BETA1 GLOB SERPL ELPH-MCNC: 6.7 % (ref 5–13)
BETA2 GLOB SERPL ELPH-MCNC: 4.8 % (ref 2–8)
BETA2+GAMMA GLOB SERPL ELPH-MCNC: 0.32 G/DL (ref 0.2–0.5)
CCP IGA+IGG SERPL IA-ACNC: 17 UNITS (ref 0–19)
CH50 SERPL-ACNC: >60 U/ML
GAMMA GLOB ABNORMAL SERPL ELPH-MCNC: 0.75 G/DL (ref 0.5–1.6)
GAMMA GLOB SERPL ELPH-MCNC: 11.2 % (ref 12–22)
IGG/ALB SER: 1.56 {RATIO} (ref 1.1–1.8)
PROT PATTERN SERPL ELPH-IMP: ABNORMAL
PROT SERPL-MCNC: 6.7 G/DL (ref 6.4–8.2)
RYE IGE QN: NEGATIVE

## 2019-03-08 ENCOUNTER — OFFICE VISIT (OUTPATIENT)
Dept: URGENT CARE | Facility: CLINIC | Age: 84
End: 2019-03-08
Payer: COMMERCIAL

## 2019-03-08 VITALS
DIASTOLIC BLOOD PRESSURE: 76 MMHG | OXYGEN SATURATION: 97 % | SYSTOLIC BLOOD PRESSURE: 138 MMHG | HEART RATE: 73 BPM | WEIGHT: 133 LBS | TEMPERATURE: 98.4 F | HEIGHT: 62 IN | RESPIRATION RATE: 20 BRPM | BODY MASS INDEX: 24.48 KG/M2

## 2019-03-08 DIAGNOSIS — R30.0 DYSURIA: Primary | ICD-10-CM

## 2019-03-08 DIAGNOSIS — N39.0 URINARY TRACT INFECTION WITH HEMATURIA, SITE UNSPECIFIED: ICD-10-CM

## 2019-03-08 DIAGNOSIS — R31.9 URINARY TRACT INFECTION WITH HEMATURIA, SITE UNSPECIFIED: ICD-10-CM

## 2019-03-08 LAB
SL AMB  POCT GLUCOSE, UA: NEGATIVE
SL AMB LEUKOCYTE ESTERASE,UA: ABNORMAL
SL AMB POCT BILIRUBIN,UA: NEGATIVE
SL AMB POCT BLOOD,UA: ABNORMAL
SL AMB POCT CLARITY,UA: ABNORMAL
SL AMB POCT COLOR,UA: ABNORMAL
SL AMB POCT KETONES,UA: NEGATIVE
SL AMB POCT NITRITE,UA: NEGATIVE
SL AMB POCT PH,UA: 6.5
SL AMB POCT SPECIFIC GRAVITY,UA: 1
SL AMB POCT URINE PROTEIN: ABNORMAL
SL AMB POCT UROBILINOGEN: 0.2

## 2019-03-08 PROCEDURE — 87086 URINE CULTURE/COLONY COUNT: CPT | Performed by: NURSE PRACTITIONER

## 2019-03-08 PROCEDURE — 81002 URINALYSIS NONAUTO W/O SCOPE: CPT | Performed by: NURSE PRACTITIONER

## 2019-03-08 PROCEDURE — 87186 SC STD MICRODIL/AGAR DIL: CPT | Performed by: NURSE PRACTITIONER

## 2019-03-08 PROCEDURE — 99213 OFFICE O/P EST LOW 20 MIN: CPT | Performed by: NURSE PRACTITIONER

## 2019-03-08 PROCEDURE — 87077 CULTURE AEROBIC IDENTIFY: CPT | Performed by: NURSE PRACTITIONER

## 2019-03-08 RX ORDER — NITROFURANTOIN 25; 75 MG/1; MG/1
100 CAPSULE ORAL 2 TIMES DAILY
Qty: 14 CAPSULE | Refills: 0 | Status: SHIPPED | OUTPATIENT
Start: 2019-03-08 | End: 2019-10-30

## 2019-03-08 NOTE — PATIENT INSTRUCTIONS
We will send your urine for culture  If we have to change your antibiotic we will call you   Macrobid 1 capsule 2 times a day for 7 days   Increase fluid intake   If symptoms worsen follow up with your PCP or go to the ER    Urinary Traction Infection in Older Adults   WHAT YOU NEED TO KNOW:   A urinary tract infection (UTI) is caused by bacteria that get inside your urinary tract  Your urinary tract includes your kidneys, ureters, bladder, and urethra  Urine is made in your kidneys, and it flows from the ureters to the bladder  Urine leaves the bladder through the urethra  A UTI is more common in your lower urinary tract, which includes your bladder and urethra  DISCHARGE INSTRUCTIONS:   Return to the emergency department if:   · You are urinating very little or not at all  · You are vomiting  · You have a high fever with shaking chills  · You have side or back pain that gets worse  Contact your healthcare provider if:   · You have a fever  · You are a woman and you have increased white or yellow discharge from your vagina  · You do not feel better after 2 days of taking antibiotics  · You have questions or concerns about your condition or care  Medicines:   · Medicines  help treat the bacterial infection or decrease pain and burning when you urinate  You may also need medicines to decrease the urge to urinate often  Your healthcare provider may recommend cranberry juice or cranberry supplements to help decrease your symptoms  · Take your medicine as directed  Contact your healthcare provider if you think your medicine is not helping or if you have side effects  Tell him or her if you are allergic to any medicine  Keep a list of the medicines, vitamins, and herbs you take  Include the amounts, and when and why you take them  Bring the list or the pill bottles to follow-up visits  Carry your medicine list with you in case of an emergency  Self-care:   · Urinate when you feel the urge    Do not hold your urine because bacteria can grow in the bladder if urine stays in the bladder too long  It may be helpful to urinate at least every 3 to 4 hours  · Drink liquids as directed  Liquids can help flush bacteria from your urinary tract  Ask how much liquid to drink each day and which liquids are best for you  You may need to drink more liquids than usual to help flush out the bacteria  Do not drink alcohol, caffeine, and citrus juices  These can irritate your bladder and increase your symptoms  · Apply heat  on your abdomen for 20 to 30 minutes every 2 hours for as many days as directed  Heat helps decrease discomfort and pressure in your bladder  Prevent a UTI:   · Women should wipe front to back  after urinating or having a bowel movement  This may prevent germs from getting into the urinary tract  · Urinate after you have sex  to flush away bacteria that can enter your urinary tract during sex  · Wear cotton underwear and clothes that fit loose  Tight pants and nylon underwear can trap moisture and cause bacteria to grow  Follow up with your healthcare provider as directed:  Write down your questions so you remember to ask them during your visits  © 2017 [facility] Information is for End User's use only and may not be sold, redistributed or otherwise used for commercial purposes  All illustrations and images included in CareNotes® are the copyrighted property of [employer]  or Magdy Barahona  The above information is an  only  It is not intended as medical advice for individual conditions or treatments  Talk to your doctor, nurse or pharmacist before following any medical regimen to see if it is safe and effective for you

## 2019-03-08 NOTE — PROGRESS NOTES
[id number]        NAME: Quiana Ortiz is a 80 y o  female  : 1927    MRN: [id number]  DATE: 2019  TIME: 3:22 PM    Assessment and Plan   Dysuria [R30 0]  1  Dysuria  POCT urine dip   2  Urinary tract infection with hematuria, site unspecified  nitrofurantoin (MACROBID) 100 mg capsule    Urine culture         Patient Instructions   We will send your urine for culture  If we have to change your antibiotic we will call you   Macrobid 1 capsule 2 times a day for 7 days   Increase fluid intake   If symptoms worsen follow up with your PCP or go to the ER      Follow up with PCP in 3-5 days  Proceed to  ER if symptoms worsen  Chief Complaint     Chief Complaint   Patient presents with    Possible UTI     x 2 weeks burning and pressure          History of Present Illness       Patient is a 70-year-old female presenting from home with a family member for dysuria for the past 2 weeks  Associated with suprapubic tenderness, urinary frequency, urgency, and decreased urination  She states it got worse yesterday  She denies fever, chills, sweats, flank pain, or hematuria  She has seen her family doctor for this approximately 2 weeks ago but states they only gave her a cream to apply externally  She is tolerating PO intake of fluids  Review of Systems   Review of Systems   Constitutional: Negative for activity change, chills and fever  Gastrointestinal: Positive for abdominal pain (suprapubic)  Negative for diarrhea, nausea and vomiting  Genitourinary: Positive for decreased urine volume, dysuria, frequency and urgency  Negative for flank pain and hematuria  Musculoskeletal: Negative for back pain  Skin: Negative for rash           Current Medications       Current Outpatient Medications:     albuterol (PROVENTIL HFA,VENTOLIN HFA) 90 mcg/act inhaler, Inhale 2 puffs every 6 (six) hours as needed for wheezing, Disp: , Rfl:     colchicine (COLCRYS) 0 6 mg tablet, Take 0 6 mg by mouth daily  , Disp: , Rfl:     cyanocobalamin (VITAMIN B-12) 1,000 mcg tablet, Take 1,000 mcg by mouth daily  , Disp: , Rfl:     dexlansoprazole (DEXILANT) 60 MG capsule, Take 60 mg by mouth daily  , Disp: , Rfl:     gabapentin (NEURONTIN) 100 mg capsule, Take 100 mg by mouth 4 (four) times a day, Disp: , Rfl:     hydrocortisone 2 5 % ointment, Apply topically 2 (two) times a day, Disp: 30 g, Rfl: 0    iron polysaccharides (NIFEREX) 150 mg capsule, Take 150 mg by mouth 2 (two) times a day, Disp: , Rfl:     megestrol (MEGACE) 40 mg tablet, Take 1 tablet by mouth 4 (four) times a day, Disp: 30 tablet, Rfl: 0    montelukast (SINGULAIR) 10 mg tablet, Take 10 mg by mouth daily  , Disp: , Rfl:     oxyCODONE (OXY-IR) 5 MG capsule, Take 1 capsule by mouth every 6 (six) hours as needed for severe pain for up to 20 doses Max Daily Amount: 20 mg, Disp: 20 capsule, Rfl: 0    oxyCODONE-acetaminophen (PERCOCET) 5-325 mg per tablet, Take 1 tablet by mouth every 6 (six) hours as needed for moderate pain , Disp: , Rfl:     pravastatin (PRAVACHOL) 10 mg tablet, Take 10 mg by mouth daily  , Disp: , Rfl:     donepezil (ARICEPT) 5 mg tablet, Take 1 tablet by mouth daily, Disp: , Rfl:     famotidine (PEPCID) 20 mg tablet, Take 1 tablet by mouth 2 (two) times a day for 4 days   (Patient taking differently: Take 20 mg by mouth daily  ), Disp: 8 tablet, Rfl: 0    nitrofurantoin (MACROBID) 100 mg capsule, Take 1 capsule (100 mg total) by mouth 2 (two) times a day, Disp: 14 capsule, Rfl: 0    Current Allergies     Allergies as of 03/08/2019    (No Known Allergies)            The following portions of the patient's history were reviewed and updated as appropriate: allergies, current medications, past family history, past medical history, past social history, past surgical history and problem list      Past Medical History:   Diagnosis Date    Arthritis     Asthma     [facility] [address] [facility]     Gout     [facility]     Neuropathy        Past Surgical History:   Procedure Laterality Date    APPENDECTOMY      HYSTERECTOMY         History reviewed  No pertinent family history  Medications have been verified  Objective   /76 (BP Location: Right arm, Patient Position: Sitting, Cuff Size: Standard)   Pulse 73   Temp 98 4 °F (36 9 °C) (Temporal)   Resp 20   Ht 5' 2" (1 575 m)   Wt 60 3 kg (133 lb)   SpO2 97%   BMI 24 33 kg/m²       urine dip:  Ana Paula hazy urine  Positive for moderate blood, trace protein, and large leukocytes  All other values within normal limits  Physical Exam     Physical Exam   Constitutional: She is oriented to person, place, and time  Vital signs are normal  She appears well-developed and well-nourished  She is active  No distress  HENT:   Head: Normocephalic  Nose: Nose normal    Cardiovascular: Normal rate, regular rhythm, S1 normal and normal heart sounds  Pulmonary/Chest: Breath sounds normal  No respiratory distress  She has no decreased breath sounds  She has no wheezes  She has no rhonchi  She has no rales  Abdominal: Soft  Normal appearance and bowel sounds are normal  There is no tenderness  Neurological: She is alert and oriented to person, place, and time  GCS eye subscore is 4  GCS verbal subscore is 5  GCS motor subscore is 6  Skin: Skin is warm and dry

## 2019-03-10 LAB — BACTERIA UR CULT: ABNORMAL

## 2019-10-30 ENCOUNTER — OFFICE VISIT (OUTPATIENT)
Dept: URGENT CARE | Facility: CLINIC | Age: 84
End: 2019-10-30
Payer: COMMERCIAL

## 2019-10-30 VITALS
OXYGEN SATURATION: 98 % | DIASTOLIC BLOOD PRESSURE: 60 MMHG | TEMPERATURE: 97.1 F | RESPIRATION RATE: 22 BRPM | HEART RATE: 79 BPM | WEIGHT: 135 LBS | SYSTOLIC BLOOD PRESSURE: 136 MMHG | HEIGHT: 61 IN | BODY MASS INDEX: 25.49 KG/M2

## 2019-10-30 DIAGNOSIS — R06.02 SHORTNESS OF BREATH: Primary | ICD-10-CM

## 2019-10-30 DIAGNOSIS — T78.40XA ALLERGIC REACTION, INITIAL ENCOUNTER: ICD-10-CM

## 2019-10-30 PROCEDURE — 96372 THER/PROPH/DIAG INJ SC/IM: CPT | Performed by: FAMILY MEDICINE

## 2019-10-30 PROCEDURE — 99213 OFFICE O/P EST LOW 20 MIN: CPT | Performed by: FAMILY MEDICINE

## 2019-10-30 PROCEDURE — 93005 ELECTROCARDIOGRAM TRACING: CPT | Performed by: FAMILY MEDICINE

## 2019-10-30 RX ORDER — METHYLPREDNISOLONE SODIUM SUCCINATE 40 MG/ML
40 INJECTION, POWDER, LYOPHILIZED, FOR SOLUTION INTRAMUSCULAR; INTRAVENOUS ONCE
Status: COMPLETED | OUTPATIENT
Start: 2019-10-30 | End: 2019-10-30

## 2019-10-30 RX ADMIN — METHYLPREDNISOLONE SODIUM SUCCINATE 40 MG: 40 INJECTION, POWDER, LYOPHILIZED, FOR SOLUTION INTRAMUSCULAR; INTRAVENOUS at 14:37

## 2019-10-30 NOTE — PATIENT INSTRUCTIONS
For any further shortness of breath, call 911 and go directly to the emergency room  Call your family doctor today and set appointment for later this week to discuss your increased shortness of breath and fatigue  Follow up with PCP in 3-5 days  Proceed to  ER if symptoms worsen  Shortness of Breath   WHAT YOU NEED TO KNOW:   Shortness of breath is a feeling that you cannot get enough air when you breathe in  You may have this feeling only during activity, or all the time  Your symptoms can range from mild to severe  Shortness of breath may be a sign of a serious health condition that needs immediate care  DISCHARGE INSTRUCTIONS:   Return to the emergency department if:   · Your signs and symptoms are the same or worse within 24 hours of treatment  · The skin over your ribs or on your neck sinks in when you breathe  · You feel confused or dizzy  Contact your healthcare provider if:   · You have new or worsening symptoms  · You have questions or concerns about your condition or care  Medicines:   · Medicines  may be used to treat the cause of your symptoms  You may need medicine to treat a bacterial infection or reduce anxiety  Other medicines may be used to open your airway, reduce swelling, or remove extra fluid  If you have a heart condition, you may need medicine to help your heart beat more strongly or regularly  · Take your medicine as directed  Contact your healthcare provider if you think your medicine is not helping or if you have side effects  Tell him or her if you are allergic to any medicine  Keep a list of the medicines, vitamins, and herbs you take  Include the amounts, and when and why you take them  Bring the list or the pill bottles to follow-up visits  Carry your medicine list with you in case of an emergency  Manage shortness of breath:   · Create an action plan  You and your healthcare provider can work together to create a plan for how to handle shortness of breath   The plan can include daily activities, treatment changes, and what to do if you have severe breathing problems  · Lean forward on your elbows when you sit  This helps your lungs expand and may make it easier to breathe  · Use pursed-lip breathing any time you feel short of breath  Breathe in through your nose and then slowly breathe out through your mouth with your lips slightly puckered  It should take you twice as long to breathe out as it did to breathe in  · Do not smoke  Nicotine and other chemicals in cigarettes and cigars can cause lung damage and make shortness of breath worse  Ask your healthcare provider for information if you currently smoke and need help to quit  E-cigarettes or smokeless tobacco still contain nicotine  Talk to your healthcare provider before you use these products  · Reach or maintain a healthy weight  Your healthcare provider can help you create a safe weight loss plan if you are overweight  · Exercise as directed  Exercise can help your lungs work more easily  Exercise can also help you lose weight if needed  Try to get at least 30 minutes of exercise most days of the week  Follow up with your healthcare provider or specialist as directed:  Write down your questions so you remember to ask them during your visits  © 2017 [address] [facility]  Information is for End User's use only and may not be sold, redistributed or otherwise used for commercial purposes  All illustrations and images included in CareNotes® are the copyrighted property of [employer] Inc  or Magdy Barahona  The above information is an  only  It is not intended as medical advice for individual conditions or treatments  Talk to your doctor, nurse or pharmacist before following any medical regimen to see if it is safe and effective for you

## 2019-10-30 NOTE — PROGRESS NOTES
[facility]        NAME: Navi Sheridan is a 80 y o  female  : 1927    MRN: [id number]  DATE: 2019  TIME: 3:06 PM    Assessment and Plan   Shortness of breath [R06 02]  1  Shortness of breath  methylPREDNISolone sodium succinate (Solu-MEDROL) injection 40 mg   2  Allergic reaction, initial encounter           Patient Instructions     For any further shortness of breath, call 911 and go directly to the emergency room  Call your family doctor today and set appointment for later this week to discuss your increased shortness of breath and fatigue  Follow up with PCP in 3-5 days  Proceed to  ER if symptoms worsen  Chief Complaint     Chief Complaint   Patient presents with    Shortness of Breath     took albuterol inhailer and it did help about 1 hour ago    hands itching     x today    Fatigue     feels weak now         History of Present Illness       Shortness of Breath (took albuterol inhailer and it did help about 1 hour ago)  hands itching (x today)  Fatigue (feels weak now)    Patient brought into the office by her sister  Patient reports increasing episodes at home where she does a little bit of work around the house, no shortness of breath or fatigue while she is doing it, but after as she sits down she exhibited is some shortness of breath and severe fatigue  Today with this happened she also noticed that her hands were very red and itchy  She is allergic to dish soap, she typically uses gloves when washing dishes, which she did today, but she thought that she might have been having a reaction to that  Shortness of Breath   This is a new problem  The current episode started today  The problem has been gradually improving  The average episode lasts 30 minutes  Fatigue   Associated symptoms include fatigue  Review of Systems   Review of Systems   Constitutional: Positive for fatigue  HENT: Negative  Respiratory: Positive for shortness of breath  Cardiovascular: Negative  Skin: Positive for color change  Current Medications       Current Outpatient Medications:     albuterol (PROVENTIL HFA,VENTOLIN HFA) 90 mcg/act inhaler, Inhale 2 puffs every 6 (six) hours as needed for wheezing, Disp: , Rfl:     colchicine (COLCRYS) 0 6 mg tablet, Take 0 6 mg by mouth daily  , Disp: , Rfl:     cyanocobalamin (VITAMIN B-12) 1,000 mcg tablet, Take 1,000 mcg by mouth daily  , Disp: , Rfl:     dexlansoprazole (DEXILANT) 60 MG capsule, Take 60 mg by mouth daily  , Disp: , Rfl:     gabapentin (NEURONTIN) 100 mg capsule, Take 100 mg by mouth 4 (four) times a day, Disp: , Rfl:     pravastatin (PRAVACHOL) 10 mg tablet, Take 10 mg by mouth daily  , Disp: , Rfl:     donepezil (ARICEPT) 5 mg tablet, Take 1 tablet by mouth daily, Disp: , Rfl:     famotidine (PEPCID) 20 mg tablet, Take 1 tablet by mouth 2 (two) times a day for 4 days  (Patient taking differently: Take 20 mg by mouth daily  ), Disp: 8 tablet, Rfl: 0    hydrocortisone 2 5 % ointment, Apply topically 2 (two) times a day, Disp: 30 g, Rfl: 0    iron polysaccharides (NIFEREX) 150 mg capsule, Take 150 mg by mouth 2 (two) times a day, Disp: , Rfl:     megestrol (MEGACE) 40 mg tablet, Take 1 tablet by mouth 4 (four) times a day, Disp: 30 tablet, Rfl: 0    montelukast (SINGULAIR) 10 mg tablet, Take 10 mg by mouth daily  , Disp: , Rfl:   No current facility-administered medications for this visit       Current Allergies     Allergies as of 10/30/2019    (No Known Allergies)            The following portions of the patient's history were reviewed and updated as appropriate: allergies, current medications, past family history, past medical history, past social history, past surgical history and problem list      Past Medical History:   Diagnosis Date    Arthritis     Asthma     [facility] [address] [facility]     Gout     Myocardial infarction ([facility] [address] [facility]     Neuropathy        Past Surgical History:   Procedure Laterality Date    APPENDECTOMY      HYSTERECTOMY         No family history on file  Medications have been verified  Objective   /60 (BP Location: Right arm, Patient Position: Sitting, Cuff Size: Standard)   Pulse 79   Temp (!) 97 1 °F (36 2 °C) (Tympanic)   Resp 22   Ht 5' 1" (1 549 m)   Wt 61 2 kg (135 lb)   SpO2 98% Comment: on 2 liters of oxygen via nasal cannula  BMI 25 51 kg/m²        Physical Exam     Physical Exam   Constitutional: She is oriented to person, place, and time  She appears well-developed and well-nourished  HENT:   Right Ear: External ear normal    Left Ear: External ear normal    Nose: Nose normal    Mouth/Throat: Oropharynx is clear and moist  No oropharyngeal exudate  Eyes: Conjunctivae are normal    Neck: Normal range of motion  Neck supple  Cardiovascular: Normal rate, regular rhythm and normal heart sounds  No murmur heard  Pulmonary/Chest: Effort normal and breath sounds normal  No respiratory distress  She has no wheezes  She has no rales  She exhibits no tenderness  Abdominal: Soft  Musculoskeletal: Normal range of motion  Lymphadenopathy:     She has no cervical adenopathy  Neurological: She is alert and oriented to person, place, and time  Skin: Skin is warm  No rash noted  No erythema

## 2019-10-31 LAB
ATRIAL RATE: 64 BPM
P AXIS: 59 DEGREES
PR INTERVAL: 166 MS
QRS AXIS: -14 DEGREES
QRSD INTERVAL: 76 MS
QT INTERVAL: 430 MS
QTC INTERVAL: 443 MS
T WAVE AXIS: 43 DEGREES
VENTRICULAR RATE: 64 BPM

## 2019-10-31 PROCEDURE — 93010 ELECTROCARDIOGRAM REPORT: CPT | Performed by: INTERNAL MEDICINE

## 2020-03-06 ENCOUNTER — TRANSCRIBE ORDERS (OUTPATIENT)
Dept: LAB | Facility: CLINIC | Age: 85
End: 2020-03-06

## 2020-03-06 ENCOUNTER — APPOINTMENT (OUTPATIENT)
Dept: LAB | Facility: CLINIC | Age: 85
End: 2020-03-06
Payer: COMMERCIAL

## 2020-03-06 DIAGNOSIS — R25.2 CRAMP OF LIMB: ICD-10-CM

## 2020-03-06 DIAGNOSIS — E61.1 IRON DEFICIENCY: ICD-10-CM

## 2020-03-06 DIAGNOSIS — Z13.6 SCREENING FOR ISCHEMIC HEART DISEASE: ICD-10-CM

## 2020-03-06 DIAGNOSIS — Z13.29 SCREENING FOR THYROID DISORDER: ICD-10-CM

## 2020-03-06 DIAGNOSIS — Z79.899 ENCOUNTER FOR LONG-TERM (CURRENT) USE OF OTHER MEDICATIONS: ICD-10-CM

## 2020-03-06 DIAGNOSIS — M06.4 INFLAMMATORY POLYARTHROPATHY (HCC): ICD-10-CM

## 2020-03-06 DIAGNOSIS — K62.89 CHRONIC IDIOPATHIC ANAL PAIN: ICD-10-CM

## 2020-03-06 DIAGNOSIS — G89.29 CHRONIC IDIOPATHIC ANAL PAIN: ICD-10-CM

## 2020-03-06 DIAGNOSIS — E53.8 BIOTIN-(PROPIONYL-COA-CARBOXYLASE) LIGASE DEFICIENCY: ICD-10-CM

## 2020-03-06 DIAGNOSIS — K21.9 GASTROESOPHAGEAL REFLUX DISEASE WITHOUT ESOPHAGITIS: ICD-10-CM

## 2020-03-06 DIAGNOSIS — R32 URINARY INCONTINENCE, UNSPECIFIED TYPE: ICD-10-CM

## 2020-03-06 DIAGNOSIS — Z13.1 SCREENING FOR DIABETES MELLITUS: ICD-10-CM

## 2020-03-06 DIAGNOSIS — L20.9 ATOPIC DERMATITIS, UNSPECIFIED TYPE: Primary | ICD-10-CM

## 2020-03-06 DIAGNOSIS — L20.9 ATOPIC DERMATITIS, UNSPECIFIED TYPE: ICD-10-CM

## 2020-03-06 DIAGNOSIS — R53.83 FATIGUE, UNSPECIFIED TYPE: ICD-10-CM

## 2020-03-06 DIAGNOSIS — E55.9 AVITAMINOSIS D: ICD-10-CM

## 2020-03-06 DIAGNOSIS — E87.6 HYPOPOTASSEMIA: ICD-10-CM

## 2020-03-06 LAB
25(OH)D3 SERPL-MCNC: 24.1 NG/ML (ref 30–100)
ALBUMIN SERPL BCP-MCNC: 3.6 G/DL (ref 3.5–5)
ALP SERPL-CCNC: 79 U/L (ref 46–116)
ALT SERPL W P-5'-P-CCNC: 23 U/L (ref 12–78)
ANION GAP SERPL CALCULATED.3IONS-SCNC: 5 MMOL/L (ref 4–13)
AST SERPL W P-5'-P-CCNC: 14 U/L (ref 5–45)
BACTERIA UR QL AUTO: NORMAL /HPF
BASOPHILS # BLD AUTO: 0.04 THOUSANDS/ΜL (ref 0–0.1)
BASOPHILS NFR BLD AUTO: 1 % (ref 0–1)
BILIRUB SERPL-MCNC: 0.31 MG/DL (ref 0.2–1)
BILIRUB UR QL STRIP: NEGATIVE
BUN SERPL-MCNC: 19 MG/DL (ref 5–25)
CALCIUM SERPL-MCNC: 8.8 MG/DL (ref 8.3–10.1)
CHLORIDE SERPL-SCNC: 113 MMOL/L (ref 100–108)
CK SERPL-CCNC: 83 U/L (ref 26–192)
CLARITY UR: CLEAR
CO2 SERPL-SCNC: 27 MMOL/L (ref 21–32)
COLOR UR: YELLOW
CREAT SERPL-MCNC: 0.86 MG/DL (ref 0.6–1.3)
CRP SERPL QL: <3 MG/L
EOSINOPHIL # BLD AUTO: 0.09 THOUSAND/ΜL (ref 0–0.61)
EOSINOPHIL NFR BLD AUTO: 1 % (ref 0–6)
ERYTHROCYTE [DISTWIDTH] IN BLOOD BY AUTOMATED COUNT: 13 % (ref 11.6–15.1)
ERYTHROCYTE [SEDIMENTATION RATE] IN BLOOD: 8 MM/HOUR (ref 0–20)
EST. AVERAGE GLUCOSE BLD GHB EST-MCNC: 117 MG/DL
FERRITIN SERPL-MCNC: 22 NG/ML (ref 8–388)
GFR SERPL CREATININE-BSD FRML MDRD: 59 ML/MIN/1.73SQ M
GLUCOSE SERPL-MCNC: 84 MG/DL (ref 65–140)
GLUCOSE UR STRIP-MCNC: NEGATIVE MG/DL
HBA1C MFR BLD: 5.7 %
HCT VFR BLD AUTO: 41 % (ref 34.8–46.1)
HGB BLD-MCNC: 13.3 G/DL (ref 11.5–15.4)
HGB UR QL STRIP.AUTO: NEGATIVE
HYALINE CASTS #/AREA URNS LPF: NORMAL /LPF
IMM GRANULOCYTES # BLD AUTO: 0.01 THOUSAND/UL (ref 0–0.2)
IMM GRANULOCYTES NFR BLD AUTO: 0 % (ref 0–2)
KETONES UR STRIP-MCNC: NEGATIVE MG/DL
LEUKOCYTE ESTERASE UR QL STRIP: NEGATIVE
LYMPHOCYTES # BLD AUTO: 1.94 THOUSANDS/ΜL (ref 0.6–4.47)
LYMPHOCYTES NFR BLD AUTO: 29 % (ref 14–44)
MAGNESIUM SERPL-MCNC: 2.2 MG/DL (ref 1.6–2.6)
MCH RBC QN AUTO: 31.1 PG (ref 26.8–34.3)
MCHC RBC AUTO-ENTMCNC: 32.4 G/DL (ref 31.4–37.4)
MCV RBC AUTO: 96 FL (ref 82–98)
MONOCYTES # BLD AUTO: 0.44 THOUSAND/ΜL (ref 0.17–1.22)
MONOCYTES NFR BLD AUTO: 7 % (ref 4–12)
NEUTROPHILS # BLD AUTO: 4.12 THOUSANDS/ΜL (ref 1.85–7.62)
NEUTS SEG NFR BLD AUTO: 62 % (ref 43–75)
NITRITE UR QL STRIP: NEGATIVE
NON-SQ EPI CELLS URNS QL MICRO: NORMAL /HPF
NRBC BLD AUTO-RTO: 0 /100 WBCS
PH UR STRIP.AUTO: 6 [PH]
PLATELET # BLD AUTO: 208 THOUSANDS/UL (ref 149–390)
PMV BLD AUTO: 10.5 FL (ref 8.9–12.7)
POTASSIUM SERPL-SCNC: 3.9 MMOL/L (ref 3.5–5.3)
PROT SERPL-MCNC: 6.7 G/DL (ref 6.4–8.2)
PROT UR STRIP-MCNC: NEGATIVE MG/DL
RBC # BLD AUTO: 4.27 MILLION/UL (ref 3.81–5.12)
RBC #/AREA URNS AUTO: NORMAL /HPF
SODIUM SERPL-SCNC: 145 MMOL/L (ref 136–145)
SP GR UR STRIP.AUTO: 1.02 (ref 1–1.03)
TSH SERPL DL<=0.05 MIU/L-ACNC: 1.54 UIU/ML (ref 0.36–3.74)
UROBILINOGEN UR QL STRIP.AUTO: 0.2 E.U./DL
VIT B12 SERPL-MCNC: 683 PG/ML (ref 100–900)
WBC # BLD AUTO: 6.64 THOUSAND/UL (ref 4.31–10.16)
WBC #/AREA URNS AUTO: NORMAL /HPF

## 2020-03-06 PROCEDURE — 80053 COMPREHEN METABOLIC PANEL: CPT

## 2020-03-06 PROCEDURE — 82306 VITAMIN D 25 HYDROXY: CPT

## 2020-03-06 PROCEDURE — 36415 COLL VENOUS BLD VENIPUNCTURE: CPT

## 2020-03-06 PROCEDURE — 86140 C-REACTIVE PROTEIN: CPT

## 2020-03-06 PROCEDURE — 82550 ASSAY OF CK (CPK): CPT

## 2020-03-06 PROCEDURE — 82088 ASSAY OF ALDOSTERONE: CPT

## 2020-03-06 PROCEDURE — 83036 HEMOGLOBIN GLYCOSYLATED A1C: CPT

## 2020-03-06 PROCEDURE — 83735 ASSAY OF MAGNESIUM: CPT

## 2020-03-06 PROCEDURE — 82728 ASSAY OF FERRITIN: CPT

## 2020-03-06 PROCEDURE — 85025 COMPLETE CBC W/AUTO DIFF WBC: CPT

## 2020-03-06 PROCEDURE — 82607 VITAMIN B-12: CPT

## 2020-03-06 PROCEDURE — 85652 RBC SED RATE AUTOMATED: CPT

## 2020-03-06 PROCEDURE — 87086 URINE CULTURE/COLONY COUNT: CPT

## 2020-03-06 PROCEDURE — 84443 ASSAY THYROID STIM HORMONE: CPT

## 2020-03-06 PROCEDURE — 81001 URINALYSIS AUTO W/SCOPE: CPT

## 2020-03-08 LAB — BACTERIA UR CULT: NORMAL

## 2020-03-13 LAB — ALDOST SERPL-MCNC: 6.9 NG/DL (ref 0–30)

## 2020-04-28 DIAGNOSIS — G62.9 NEUROPATHY: Primary | ICD-10-CM

## 2020-04-28 RX ORDER — TRAMADOL HYDROCHLORIDE 50 MG/1
TABLET ORAL
Qty: 60 TABLET | Refills: 5 | Status: SHIPPED | OUTPATIENT
Start: 2020-04-28 | End: 2020-12-02

## 2020-06-05 DIAGNOSIS — G62.9 NEUROPATHY: Primary | ICD-10-CM

## 2020-06-07 DIAGNOSIS — I10 ESSENTIAL HYPERTENSION: Primary | ICD-10-CM

## 2020-06-07 RX ORDER — GABAPENTIN 100 MG/1
CAPSULE ORAL
Qty: 120 CAPSULE | Refills: 5 | Status: SHIPPED | OUTPATIENT
Start: 2020-06-07 | End: 2021-04-13

## 2020-06-07 RX ORDER — PRAVASTATIN SODIUM 10 MG
TABLET ORAL
Qty: 90 TABLET | Refills: 1 | Status: SHIPPED | OUTPATIENT
Start: 2020-06-07 | End: 2021-01-01

## 2020-07-22 ENCOUNTER — OFFICE VISIT (OUTPATIENT)
Dept: URGENT CARE | Facility: CLINIC | Age: 85
End: 2020-07-22
Payer: COMMERCIAL

## 2020-07-22 VITALS
WEIGHT: 127 LBS | HEART RATE: 82 BPM | OXYGEN SATURATION: 95 % | BODY MASS INDEX: 23.37 KG/M2 | DIASTOLIC BLOOD PRESSURE: 65 MMHG | HEIGHT: 62 IN | SYSTOLIC BLOOD PRESSURE: 136 MMHG | TEMPERATURE: 98 F | RESPIRATION RATE: 18 BRPM

## 2020-07-22 DIAGNOSIS — R06.00 DYSPNEA, UNSPECIFIED TYPE: ICD-10-CM

## 2020-07-22 DIAGNOSIS — L20.9 ATOPIC DERMATITIS, UNSPECIFIED TYPE: Primary | ICD-10-CM

## 2020-07-22 PROCEDURE — 96372 THER/PROPH/DIAG INJ SC/IM: CPT | Performed by: NURSE PRACTITIONER

## 2020-07-22 PROCEDURE — 99213 OFFICE O/P EST LOW 20 MIN: CPT | Performed by: NURSE PRACTITIONER

## 2020-07-22 RX ORDER — METHYLPREDNISOLONE SODIUM SUCCINATE 40 MG/ML
40 INJECTION, POWDER, LYOPHILIZED, FOR SOLUTION INTRAMUSCULAR; INTRAVENOUS ONCE
Status: DISCONTINUED | OUTPATIENT
Start: 2020-07-22 | End: 2020-07-22

## 2020-07-22 RX ORDER — METHYLPREDNISOLONE SODIUM SUCCINATE 40 MG/ML
40 INJECTION, POWDER, LYOPHILIZED, FOR SOLUTION INTRAMUSCULAR; INTRAVENOUS ONCE
Status: COMPLETED | OUTPATIENT
Start: 2020-07-22 | End: 2020-07-22

## 2020-07-22 RX ADMIN — METHYLPREDNISOLONE SODIUM SUCCINATE 40 MG: 40 INJECTION, POWDER, LYOPHILIZED, FOR SOLUTION INTRAMUSCULAR; INTRAVENOUS at 18:32

## 2020-07-22 NOTE — PATIENT INSTRUCTIONS
Use non-perfumed soaps and lotions  Can use coconut oil for natural skin lubrication  Avoid hot showers  Can take OTC pepcid to help with the itching  You must follow up with an allergist or dermatology for your skin  Follow up with primary care as needed    If you feel SOB or have difficulty breathing go directly to the emergency room

## 2020-07-22 NOTE — PROGRESS NOTES
[facility]        NAME: Tressa Duran is a 80 y o  female  : 1927    MRN: [id number]  DATE: 2020  TIME: 4:29 PM    Assessment and Plan   Atopic dermatitis, unspecified type [L20 9]  1  Atopic dermatitis, unspecified type  methylPREDNISolone sodium succinate (Solu-MEDROL) injection 40 mg    DISCONTINUED: methylPREDNISolone sodium succinate (Solu-MEDROL) injection 40 mg   2  Dyspnea, unspecified type           Patient Instructions     Use non-perfumed soaps and lotions  Can use coconut oil for natural skin lubrication  Avoid hot showers  Can take OTC pepcid to help with the itching  You must follow up with an allergist or dermatology for your skin  Follow up with primary care as needed  If you feel SOB or have difficulty breathing go directly to the emergency room    Follow up with PCP in 3-5 days  Proceed to  ER if symptoms worsen  Chief Complaint     Chief Complaint   Patient presents with    Rash     rashing, itching, wheezing, cant figure out what its from, took 2 allergy pills two hours ago, onset of symptoms today, exhaustion         History of Present Illness       79 y/o female presents with itchy rash that began this morning  She states when the rash began she had some shortness of breath and wheezing  She took her inhaler and the SOB and wheezing resolved  She currently denies any SOB, wheezing, difficulty breathing, N/V, CP, or palpitations  She took 2 allergy pills for the itching but it persists  Review of Systems   Review of Systems   Constitutional: Negative for chills and fever  Respiratory: Positive for shortness of breath and wheezing  Cardiovascular: Negative for chest pain and palpitations  Gastrointestinal: Negative for nausea and vomiting  Musculoskeletal: Negative for back pain and myalgias  Skin: Positive for color change  Neurological: Negative for headaches           Current Medications       Current Outpatient Medications:    albuterol (PROVENTIL HFA,VENTOLIN HFA) 90 mcg/act inhaler, Inhale 2 puffs every 6 (six) hours as needed for wheezing, Disp: , Rfl:     colchicine (COLCRYS) 0 6 mg tablet, Take 0 6 mg by mouth daily  , Disp: , Rfl:     cyanocobalamin (VITAMIN B-12) 1,000 mcg tablet, Take 1,000 mcg by mouth daily  , Disp: , Rfl:     dexlansoprazole (DEXILANT) 60 MG capsule, Take 60 mg by mouth daily  , Disp: , Rfl:     donepezil (ARICEPT) 5 mg tablet, Take 1 tablet by mouth daily, Disp: , Rfl:     famotidine (PEPCID) 20 mg tablet, Take 1 tablet by mouth 2 (two) times a day for 4 days   (Patient taking differently: Take 20 mg by mouth daily  ), Disp: 8 tablet, Rfl: 0    gabapentin (NEURONTIN) 100 mg capsule, TAKE 1 CAPSULE BY MOUTH UP TO FOUR TIMES DAILY, Disp: 120 capsule, Rfl: 5    hydrocortisone 2 5 % ointment, Apply topically 2 (two) times a day, Disp: 30 g, Rfl: 0    iron polysaccharides (NIFEREX) 150 mg capsule, Take 150 mg by mouth 2 (two) times a day, Disp: , Rfl:     megestrol (MEGACE) 40 mg tablet, Take 1 tablet by mouth 4 (four) times a day, Disp: 30 tablet, Rfl: 0    montelukast (SINGULAIR) 10 mg tablet, Take 10 mg by mouth daily  , Disp: , Rfl:     pravastatin (PRAVACHOL) 10 mg tablet, TAKE ONE TABLET BY MOUTH EVERY DAY, Disp: 90 tablet, Rfl: 1    traMADol (ULTRAM) 50 mg tablet, TAKE ONE TABLET BY MOUTH TWICE A DAY, Disp: 60 tablet, Rfl: 5    Current Facility-Administered Medications:     methylPREDNISolone sodium succinate (Solu-MEDROL) injection 40 mg, 40 mg, Intramuscular, Once, MARIO Her    Current Allergies     Allergies as of 07/22/2020    (No Known Allergies)            The following portions of the patient's history were reviewed and updated as appropriate: allergies, current medications, past family history, past medical history, past social history, past surgical history and problem list      Past Medical History:   Diagnosis Date    Arthritis     Asthma     [facility] [address] [facility]     Chondrocalcinosis     Chronic sciatica     Dementia (HCC)     Diverticulosis     GERD (gastroesophageal reflux disease)     Gout     High blood pressure     History of low potassium     Hyperlipidemia     Insomnia     Iron deficiency anemia     Low vitamin D level     [facility] [address] [facility]     Neuropathy     Uterine cancer ([facility] [address] [facility]        Past Surgical History:   Procedure Laterality Date    APPENDECTOMY      CATARACT EXTRACTION Right     COLONOSCOPY      2012?  EGD  04/2019    upper- Hiatal hernia    ESOPHAGOGASTRODUODENOSCOPY      HYSTERECTOMY      TONSILLECTOMY         Family History   Problem Relation Age of Onset    Cancer Sister     Stroke Sister     Heart disease Brother          Medications have been verified  Objective   /65   Pulse 82   Temp 98 °F (36 7 °C)   Resp 18   Ht 5' 2" (1 575 m)   Wt 57 6 kg (127 lb)   SpO2 95%   BMI 23 23 kg/m²        Physical Exam     Physical Exam   Constitutional: She is oriented to person, place, and time  Vital signs are normal  She appears well-developed and well-nourished  No distress  HENT:   Nose: Nose normal    Mouth/Throat: Uvula is midline and oropharynx is clear and moist    Cardiovascular: Normal rate, regular rhythm and normal heart sounds  Pulmonary/Chest: Effort normal and breath sounds normal    Neurological: She is alert and oriented to person, place, and time  She has normal strength  GCS eye subscore is 4  GCS verbal subscore is 5  GCS motor subscore is 6  Skin: Skin is warm and dry  Rash noted  No pallor  There is a flat red rash that is located on the bilateral arms and legs  Areas are pruritic  Skin is flaky and dry  There is no drainage present  Psychiatric: She has a normal mood and affect  Her speech is normal and behavior is normal    Nursing note and vitals reviewed

## 2020-08-26 DIAGNOSIS — J45.20 MILD INTERMITTENT ASTHMA, UNSPECIFIED WHETHER COMPLICATED: Primary | ICD-10-CM

## 2020-10-05 DIAGNOSIS — K21.9 GASTROESOPHAGEAL REFLUX DISEASE WITHOUT ESOPHAGITIS: Primary | ICD-10-CM

## 2020-12-01 DIAGNOSIS — G62.9 NEUROPATHY: ICD-10-CM

## 2020-12-02 RX ORDER — TRAMADOL HYDROCHLORIDE 50 MG/1
TABLET ORAL
Qty: 60 TABLET | Refills: 5 | Status: SHIPPED | OUTPATIENT
Start: 2020-12-02 | End: 2021-06-04

## 2021-01-01 ENCOUNTER — TELEPHONE (OUTPATIENT)
Dept: FAMILY MEDICINE CLINIC | Facility: CLINIC | Age: 86
End: 2021-01-01

## 2021-01-01 ENCOUNTER — OFFICE VISIT (OUTPATIENT)
Dept: FAMILY MEDICINE CLINIC | Facility: CLINIC | Age: 86
End: 2021-01-01
Payer: COMMERCIAL

## 2021-01-01 VITALS
DIASTOLIC BLOOD PRESSURE: 64 MMHG | SYSTOLIC BLOOD PRESSURE: 140 MMHG | OXYGEN SATURATION: 99 % | WEIGHT: 127 LBS | HEART RATE: 70 BPM | RESPIRATION RATE: 16 BRPM | BODY MASS INDEX: 23.37 KG/M2 | HEIGHT: 62 IN

## 2021-01-01 DIAGNOSIS — N18.31 STAGE 3A CHRONIC KIDNEY DISEASE (HCC): ICD-10-CM

## 2021-01-01 DIAGNOSIS — G30.0 ALZHEIMER'S DISEASE, EARLY ONSET (HCC): ICD-10-CM

## 2021-01-01 DIAGNOSIS — Z79.899 OTHER LONG TERM (CURRENT) DRUG THERAPY: ICD-10-CM

## 2021-01-01 DIAGNOSIS — E55.9 VITAMIN D DEFICIENCY: ICD-10-CM

## 2021-01-01 DIAGNOSIS — F02.80 ALZHEIMER'S DISEASE, EARLY ONSET (HCC): ICD-10-CM

## 2021-01-01 DIAGNOSIS — R53.83 FATIGUE, UNSPECIFIED TYPE: ICD-10-CM

## 2021-01-01 DIAGNOSIS — J45.40 MODERATE PERSISTENT ASTHMA WITHOUT COMPLICATION: ICD-10-CM

## 2021-01-01 DIAGNOSIS — F11.20 CONTINUOUS OPIOID DEPENDENCE (HCC): ICD-10-CM

## 2021-01-01 DIAGNOSIS — Z00.00 WELL ADULT EXAM: Primary | ICD-10-CM

## 2021-01-01 DIAGNOSIS — Z23 NEED FOR VACCINATION: ICD-10-CM

## 2021-01-01 PROCEDURE — G0439 PPPS, SUBSEQ VISIT: HCPCS

## 2021-01-01 PROCEDURE — 99214 OFFICE O/P EST MOD 30 MIN: CPT

## 2021-01-01 PROCEDURE — 90662 IIV NO PRSV INCREASED AG IM: CPT

## 2021-01-01 PROCEDURE — G0008 ADMIN INFLUENZA VIRUS VAC: HCPCS

## 2021-01-01 RX ORDER — FLUTICASONE FUROATE AND VILANTEROL TRIFENATATE 100; 25 UG/1; UG/1
1 POWDER RESPIRATORY (INHALATION) DAILY
Qty: 60 BLISTER | Refills: 0 | Status: SHIPPED | OUTPATIENT
Start: 2021-01-01 | End: 2022-01-01

## 2021-01-07 DIAGNOSIS — K21.9 GASTROESOPHAGEAL REFLUX DISEASE WITHOUT ESOPHAGITIS: ICD-10-CM

## 2021-02-12 DIAGNOSIS — Z23 ENCOUNTER FOR IMMUNIZATION: ICD-10-CM

## 2021-04-13 DIAGNOSIS — G62.9 NEUROPATHY: ICD-10-CM

## 2021-04-13 RX ORDER — GABAPENTIN 100 MG/1
CAPSULE ORAL
Qty: 120 CAPSULE | Refills: 5 | Status: SHIPPED | OUTPATIENT
Start: 2021-04-13 | End: 2022-01-01 | Stop reason: ALTCHOICE

## 2021-06-03 DIAGNOSIS — G62.9 NEUROPATHY: ICD-10-CM

## 2021-06-04 RX ORDER — TRAMADOL HYDROCHLORIDE 50 MG/1
TABLET ORAL
Qty: 60 TABLET | Refills: 5 | Status: SHIPPED | OUTPATIENT
Start: 2021-06-04 | End: 2022-01-01

## 2021-11-16 PROBLEM — N18.31 STAGE 3A CHRONIC KIDNEY DISEASE (HCC): Status: ACTIVE | Noted: 2021-01-01

## 2021-11-16 PROBLEM — F02.80 ALZHEIMER'S DISEASE, EARLY ONSET (HCC): Status: ACTIVE | Noted: 2021-01-01

## 2021-11-16 PROBLEM — G30.0 ALZHEIMER'S DISEASE, EARLY ONSET (HCC): Status: ACTIVE | Noted: 2021-01-01

## 2021-11-16 PROBLEM — F11.20 CONTINUOUS OPIOID DEPENDENCE (HCC): Status: ACTIVE | Noted: 2021-01-01

## 2022-01-01 ENCOUNTER — NURSING HOME VISIT (OUTPATIENT)
Dept: GERIATRICS | Facility: OTHER | Age: 87
End: 2022-01-01
Payer: COMMERCIAL

## 2022-01-01 ENCOUNTER — OFFICE VISIT (OUTPATIENT)
Dept: FAMILY MEDICINE CLINIC | Facility: CLINIC | Age: 87
End: 2022-01-01
Payer: COMMERCIAL

## 2022-01-01 ENCOUNTER — TELEPHONE (OUTPATIENT)
Dept: OTHER | Facility: OTHER | Age: 87
End: 2022-01-01

## 2022-01-01 ENCOUNTER — HOME CARE VISIT (OUTPATIENT)
Dept: HOME HEALTH SERVICES | Facility: HOME HEALTHCARE | Age: 87
End: 2022-01-01
Payer: MEDICARE

## 2022-01-01 ENCOUNTER — HOME CARE VISIT (OUTPATIENT)
Dept: HOME HOSPICE | Facility: HOSPICE | Age: 87
End: 2022-01-01
Payer: MEDICARE

## 2022-01-01 ENCOUNTER — APPOINTMENT (EMERGENCY)
Dept: CT IMAGING | Facility: HOSPITAL | Age: 87
End: 2022-01-01
Payer: COMMERCIAL

## 2022-01-01 ENCOUNTER — TELEPHONE (OUTPATIENT)
Dept: FAMILY MEDICINE CLINIC | Facility: CLINIC | Age: 87
End: 2022-01-01

## 2022-01-01 ENCOUNTER — HOSPITAL ENCOUNTER (EMERGENCY)
Facility: HOSPITAL | Age: 87
Discharge: HOME | End: 2022-07-05
Attending: EMERGENCY MEDICINE
Payer: COMMERCIAL

## 2022-01-01 ENCOUNTER — APPOINTMENT (EMERGENCY)
Dept: RADIOLOGY | Facility: HOSPITAL | Age: 87
End: 2022-01-01
Payer: COMMERCIAL

## 2022-01-01 ENCOUNTER — TRANSITIONAL CARE MANAGEMENT (OUTPATIENT)
Dept: FAMILY MEDICINE CLINIC | Facility: CLINIC | Age: 87
End: 2022-01-01

## 2022-01-01 ENCOUNTER — HOSPITAL ENCOUNTER (INPATIENT)
Facility: HOSPITAL | Age: 87
LOS: 5 days | Discharge: HOSPICE | DRG: 923 | End: 2022-07-28
Attending: EMERGENCY MEDICINE | Admitting: INTERNAL MEDICINE
Payer: COMMERCIAL

## 2022-01-01 ENCOUNTER — HOSPITAL ENCOUNTER (INPATIENT)
Facility: HOSPITAL | Age: 87
LOS: 3 days | Discharge: NURSING, REHAB OR LONG-TERM CARE | DRG: 179 | End: 2022-06-10
Attending: EMERGENCY MEDICINE | Admitting: INTERNAL MEDICINE
Payer: COMMERCIAL

## 2022-01-01 ENCOUNTER — APPOINTMENT (EMERGENCY)
Dept: RADIOLOGY | Facility: HOSPITAL | Age: 87
DRG: 179 | End: 2022-01-01
Payer: COMMERCIAL

## 2022-01-01 ENCOUNTER — TRANSCRIBE ORDERS (OUTPATIENT)
Dept: HOME HEALTH SERVICES | Facility: HOME HEALTHCARE | Age: 87
End: 2022-01-01

## 2022-01-01 ENCOUNTER — APPOINTMENT (INPATIENT)
Dept: NON INVASIVE DIAGNOSTICS | Facility: HOSPITAL | Age: 87
DRG: 179 | End: 2022-01-01
Payer: COMMERCIAL

## 2022-01-01 ENCOUNTER — HOSPICE ADMISSION (OUTPATIENT)
Dept: HOME HOSPICE | Facility: HOSPICE | Age: 87
End: 2022-01-01
Payer: MEDICARE

## 2022-01-01 ENCOUNTER — APPOINTMENT (EMERGENCY)
Dept: RADIOLOGY | Facility: HOSPITAL | Age: 87
DRG: 923 | End: 2022-01-01
Payer: COMMERCIAL

## 2022-01-01 ENCOUNTER — APPOINTMENT (INPATIENT)
Dept: RADIOLOGY | Facility: HOSPITAL | Age: 87
DRG: 923 | End: 2022-01-01
Payer: COMMERCIAL

## 2022-01-01 VITALS
HEART RATE: 80 BPM | SYSTOLIC BLOOD PRESSURE: 120 MMHG | DIASTOLIC BLOOD PRESSURE: 60 MMHG | RESPIRATION RATE: 16 BRPM | BODY MASS INDEX: 25.03 KG/M2 | WEIGHT: 136 LBS | OXYGEN SATURATION: 94 % | HEIGHT: 62 IN

## 2022-01-01 VITALS
WEIGHT: 118 LBS | SYSTOLIC BLOOD PRESSURE: 120 MMHG | TEMPERATURE: 98.3 F | BODY MASS INDEX: 23.05 KG/M2 | OXYGEN SATURATION: 100 % | DIASTOLIC BLOOD PRESSURE: 66 MMHG | HEART RATE: 71 BPM | RESPIRATION RATE: 18 BRPM

## 2022-01-01 VITALS
DIASTOLIC BLOOD PRESSURE: 64 MMHG | OXYGEN SATURATION: 97 % | RESPIRATION RATE: 16 BRPM | HEART RATE: 60 BPM | SYSTOLIC BLOOD PRESSURE: 116 MMHG | WEIGHT: 127 LBS | TEMPERATURE: 97 F | BODY MASS INDEX: 24.8 KG/M2

## 2022-01-01 VITALS
TEMPERATURE: 97.7 F | WEIGHT: 132.72 LBS | DIASTOLIC BLOOD PRESSURE: 67 MMHG | HEART RATE: 59 BPM | RESPIRATION RATE: 16 BRPM | BODY MASS INDEX: 26.06 KG/M2 | SYSTOLIC BLOOD PRESSURE: 142 MMHG | OXYGEN SATURATION: 97 % | HEIGHT: 60 IN

## 2022-01-01 VITALS — DIASTOLIC BLOOD PRESSURE: 60 MMHG | SYSTOLIC BLOOD PRESSURE: 122 MMHG | HEART RATE: 100 BPM | RESPIRATION RATE: 20 BRPM

## 2022-01-01 VITALS — TEMPERATURE: 97.8 F | RESPIRATION RATE: 20 BRPM | HEART RATE: 92 BPM

## 2022-01-01 VITALS
HEART RATE: 60 BPM | WEIGHT: 133 LBS | BODY MASS INDEX: 25.97 KG/M2 | SYSTOLIC BLOOD PRESSURE: 124 MMHG | DIASTOLIC BLOOD PRESSURE: 82 MMHG | RESPIRATION RATE: 16 BRPM

## 2022-01-01 VITALS
SYSTOLIC BLOOD PRESSURE: 147 MMHG | HEIGHT: 60 IN | BODY MASS INDEX: 25.49 KG/M2 | TEMPERATURE: 101.4 F | OXYGEN SATURATION: 90 % | RESPIRATION RATE: 16 BRPM | HEART RATE: 78 BPM | DIASTOLIC BLOOD PRESSURE: 62 MMHG | WEIGHT: 129.85 LBS

## 2022-01-01 VITALS — RESPIRATION RATE: 16 BRPM | HEART RATE: 64 BPM | SYSTOLIC BLOOD PRESSURE: 118 MMHG | DIASTOLIC BLOOD PRESSURE: 78 MMHG

## 2022-01-01 VITALS — RESPIRATION RATE: 32 BRPM | HEART RATE: 124 BPM

## 2022-01-01 VITALS
HEART RATE: 89 BPM | HEIGHT: 60 IN | BODY MASS INDEX: 23.16 KG/M2 | DIASTOLIC BLOOD PRESSURE: 70 MMHG | SYSTOLIC BLOOD PRESSURE: 118 MMHG | RESPIRATION RATE: 16 BRPM | OXYGEN SATURATION: 98 % | WEIGHT: 118 LBS

## 2022-01-01 DIAGNOSIS — E87.20 LACTIC ACIDOSIS: ICD-10-CM

## 2022-01-01 DIAGNOSIS — N18.31 STAGE 3A CHRONIC KIDNEY DISEASE (HCC): ICD-10-CM

## 2022-01-01 DIAGNOSIS — R35.0 URINARY FREQUENCY: ICD-10-CM

## 2022-01-01 DIAGNOSIS — F02.80 ALZHEIMER'S DISEASE, EARLY ONSET (HCC): ICD-10-CM

## 2022-01-01 DIAGNOSIS — R30.0 DYSURIA: Primary | ICD-10-CM

## 2022-01-01 DIAGNOSIS — I10 ESSENTIAL HYPERTENSION: ICD-10-CM

## 2022-01-01 DIAGNOSIS — G93.40 ENCEPHALOPATHY: ICD-10-CM

## 2022-01-01 DIAGNOSIS — G30.0 ALZHEIMER'S DISEASE, EARLY ONSET (HCC): ICD-10-CM

## 2022-01-01 DIAGNOSIS — Z51.5 HOSPICE CARE PATIENT: Primary | ICD-10-CM

## 2022-01-01 DIAGNOSIS — Z86.16 HISTORY OF COVID-19: Primary | ICD-10-CM

## 2022-01-01 DIAGNOSIS — R30.0 BURNING WITH URINATION: ICD-10-CM

## 2022-01-01 DIAGNOSIS — U07.1 COVID-19: Primary | ICD-10-CM

## 2022-01-01 DIAGNOSIS — G62.9 NEUROPATHY: ICD-10-CM

## 2022-01-01 DIAGNOSIS — J45.20 MILD INTERMITTENT ASTHMA WITHOUT COMPLICATION: ICD-10-CM

## 2022-01-01 DIAGNOSIS — Z71.89 GOALS OF CARE, COUNSELING/DISCUSSION: ICD-10-CM

## 2022-01-01 DIAGNOSIS — R77.8 ELEVATED TROPONIN: ICD-10-CM

## 2022-01-01 DIAGNOSIS — K21.9 GASTROESOPHAGEAL REFLUX DISEASE WITHOUT ESOPHAGITIS: ICD-10-CM

## 2022-01-01 DIAGNOSIS — M25.551 RIGHT HIP PAIN: ICD-10-CM

## 2022-01-01 DIAGNOSIS — R55 NEAR SYNCOPE: Primary | ICD-10-CM
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
## 2022-01-01 DIAGNOSIS — R45.1 AGITATION: ICD-10-CM

## 2022-01-01 DIAGNOSIS — R53.1 GENERAL WEAKNESS: ICD-10-CM

## 2022-01-01 DIAGNOSIS — I21.4 NSTEMI (NON-ST ELEVATED MYOCARDIAL INFARCTION) (HCC): Primary | ICD-10-CM

## 2022-01-01 DIAGNOSIS — T67.5XXA HEAT EXHAUSTION, INITIAL ENCOUNTER: ICD-10-CM

## 2022-01-01 DIAGNOSIS — N39.0 UTI (URINARY TRACT INFECTION): ICD-10-CM

## 2022-01-01 DIAGNOSIS — Z51.5 HOSPICE CARE PATIENT: ICD-10-CM

## 2022-01-01 DIAGNOSIS — U07.1 COVID-19: ICD-10-CM

## 2022-01-01 DIAGNOSIS — G96.9 DISORDER OF NERVOUS SYSTEM (CENTRAL): Primary | ICD-10-CM

## 2022-01-01 DIAGNOSIS — F03.90 DEMENTIA (HCC): ICD-10-CM

## 2022-01-01 DIAGNOSIS — R53.83 FATIGUE, UNSPECIFIED TYPE: ICD-10-CM

## 2022-01-01 DIAGNOSIS — F11.20 CONTINUOUS OPIOID DEPENDENCE (HCC): ICD-10-CM

## 2022-01-01 LAB
2HR DELTA HS TROPONIN: 1 NG/L
2HR DELTA HS TROPONIN: 2 NG/L
4HR DELTA HS TROPONIN: -218 NG/L
ALBUMIN SERPL BCP-MCNC: 3.1 G/DL (ref 3.5–5)
ALBUMIN SERPL BCP-MCNC: 3.1 G/DL (ref 3.5–5)
ALBUMIN SERPL BCP-MCNC: 3.2 G/DL (ref 3.5–5)
ALBUMIN SERPL BCP-MCNC: 3.3 G/DL (ref 3.5–5)
ALBUMIN SERPL BCP-MCNC: 3.7 G/DL (ref 3.5–5)
ALP SERPL-CCNC: 50 U/L (ref 34–104)
ALP SERPL-CCNC: 53 U/L (ref 34–104)
ALP SERPL-CCNC: 55 U/L (ref 34–104)
ALP SERPL-CCNC: 63 U/L (ref 34–104)
ALP SERPL-CCNC: 65 U/L (ref 34–104)
ALT SERPL W P-5'-P-CCNC: 10 U/L (ref 7–52)
ALT SERPL W P-5'-P-CCNC: 11 U/L (ref 7–52)
ALT SERPL W P-5'-P-CCNC: 11 U/L (ref 7–52)
ALT SERPL W P-5'-P-CCNC: 12 U/L (ref 7–52)
ALT SERPL W P-5'-P-CCNC: 12 U/L (ref 7–52)
ANION GAP SERPL CALCULATED.3IONS-SCNC: 10 MMOL/L (ref 4–13)
ANION GAP SERPL CALCULATED.3IONS-SCNC: 10 MMOL/L (ref 4–13)
ANION GAP SERPL CALCULATED.3IONS-SCNC: 11 MMOL/L (ref 4–13)
ANION GAP SERPL CALCULATED.3IONS-SCNC: 12 MMOL/L (ref 4–13)
ANION GAP SERPL CALCULATED.3IONS-SCNC: 7 MMOL/L (ref 4–13)
ANION GAP SERPL CALCULATED.3IONS-SCNC: 8 MMOL/L (ref 4–13)
ANION GAP SERPL CALCULATED.3IONS-SCNC: 8 MMOL/L (ref 4–13)
ANION GAP SERPL CALCULATED.3IONS-SCNC: 9 MMOL/L (ref 4–13)
ANION GAP SERPL CALCULATED.3IONS-SCNC: 9 MMOL/L (ref 4–13)
APICAL FOUR CHAMBER EJECTION FRACTION: 62 %
AST SERPL W P-5'-P-CCNC: 14 U/L (ref 13–39)
AST SERPL W P-5'-P-CCNC: 17 U/L (ref 13–39)
AST SERPL W P-5'-P-CCNC: 19 U/L (ref 13–39)
AST SERPL W P-5'-P-CCNC: 20 U/L (ref 13–39)
AST SERPL W P-5'-P-CCNC: 21 U/L (ref 13–39)
ATRIAL RATE: 138 BPM
ATRIAL RATE: 65 BPM
ATRIAL RATE: 84 BPM
ATRIAL RATE: 95 BPM
BACTERIA UR QL AUTO: ABNORMAL /HPF
BASOPHILS # BLD AUTO: 0.01 THOUSANDS/ΜL (ref 0–0.1)
BASOPHILS # BLD AUTO: 0.02 THOUSANDS/ΜL (ref 0–0.1)
BASOPHILS NFR BLD AUTO: 0 % (ref 0–1)
BILIRUB SERPL-MCNC: 0.43 MG/DL (ref 0.2–1)
BILIRUB SERPL-MCNC: 0.51 MG/DL (ref 0.2–1)
BILIRUB SERPL-MCNC: 0.65 MG/DL (ref 0.2–1)
BILIRUB SERPL-MCNC: 0.71 MG/DL (ref 0.2–1)
BILIRUB SERPL-MCNC: 0.85 MG/DL (ref 0.2–1)
BILIRUB UR QL STRIP: NEGATIVE
BILIRUB UR QL STRIP: NEGATIVE
BNP SERPL-MCNC: 468 PG/ML (ref 0–100)
BUN SERPL-MCNC: 10 MG/DL (ref 5–25)
BUN SERPL-MCNC: 13 MG/DL (ref 5–25)
BUN SERPL-MCNC: 14 MG/DL (ref 5–25)
BUN SERPL-MCNC: 14 MG/DL (ref 5–25)
BUN SERPL-MCNC: 15 MG/DL (ref 5–25)
BUN SERPL-MCNC: 16 MG/DL (ref 5–25)
BUN SERPL-MCNC: 8 MG/DL (ref 5–25)
CALCIUM ALBUM COR SERPL-MCNC: 8.8 MG/DL (ref 8.3–10.1)
CALCIUM ALBUM COR SERPL-MCNC: 9.3 MG/DL (ref 8.3–10.1)
CALCIUM ALBUM COR SERPL-MCNC: 9.4 MG/DL (ref 8.3–10.1)
CALCIUM ALBUM COR SERPL-MCNC: 9.5 MG/DL (ref 8.3–10.1)
CALCIUM SERPL-MCNC: 7.8 MG/DL (ref 8.4–10.2)
CALCIUM SERPL-MCNC: 7.9 MG/DL (ref 8.4–10.2)
CALCIUM SERPL-MCNC: 8.1 MG/DL (ref 8.4–10.2)
CALCIUM SERPL-MCNC: 8.2 MG/DL (ref 8.4–10.2)
CALCIUM SERPL-MCNC: 8.6 MG/DL (ref 8.4–10.2)
CALCIUM SERPL-MCNC: 8.7 MG/DL (ref 8.4–10.2)
CALCIUM SERPL-MCNC: 8.8 MG/DL (ref 8.4–10.2)
CALCIUM SERPL-MCNC: 8.8 MG/DL (ref 8.4–10.2)
CALCIUM SERPL-MCNC: 9.3 MG/DL (ref 8.4–10.2)
CARDIAC TROPONIN I PNL SERPL HS: 1192 NG/L
CARDIAC TROPONIN I PNL SERPL HS: 12 NG/L
CARDIAC TROPONIN I PNL SERPL HS: 14 NG/L
CARDIAC TROPONIN I PNL SERPL HS: 15 NG/L
CARDIAC TROPONIN I PNL SERPL HS: 16 NG/L
CARDIAC TROPONIN I PNL SERPL HS: 974 NG/L
CHLORIDE SERPL-SCNC: 102 MMOL/L (ref 96–108)
CHLORIDE SERPL-SCNC: 103 MMOL/L (ref 96–108)
CHLORIDE SERPL-SCNC: 104 MMOL/L (ref 96–108)
CHLORIDE SERPL-SCNC: 106 MMOL/L (ref 96–108)
CHLORIDE SERPL-SCNC: 107 MMOL/L (ref 96–108)
CHLORIDE SERPL-SCNC: 107 MMOL/L (ref 96–108)
CHLORIDE SERPL-SCNC: 111 MMOL/L (ref 96–108)
CHLORIDE SERPL-SCNC: 112 MMOL/L (ref 96–108)
CHLORIDE SERPL-SCNC: 113 MMOL/L (ref 96–108)
CK SERPL-CCNC: 63 U/L (ref 26–192)
CK SERPL-CCNC: 92 U/L (ref 26–192)
CLARITY UR: ABNORMAL
CLARITY UR: CLEAR
CO2 SERPL-SCNC: 22 MMOL/L (ref 21–32)
CO2 SERPL-SCNC: 22 MMOL/L (ref 21–32)
CO2 SERPL-SCNC: 23 MMOL/L (ref 21–32)
CO2 SERPL-SCNC: 24 MMOL/L (ref 21–32)
CO2 SERPL-SCNC: 24 MMOL/L (ref 21–32)
CO2 SERPL-SCNC: 25 MMOL/L (ref 21–32)
CO2 SERPL-SCNC: 26 MMOL/L (ref 21–32)
CO2 SERPL-SCNC: 26 MMOL/L (ref 21–32)
CO2 SERPL-SCNC: 27 MMOL/L (ref 21–32)
COLOR UR: YELLOW
COLOR UR: YELLOW
CREAT SERPL-MCNC: 0.67 MG/DL (ref 0.6–1.3)
CREAT SERPL-MCNC: 0.74 MG/DL (ref 0.6–1.3)
CREAT SERPL-MCNC: 0.78 MG/DL (ref 0.6–1.3)
CREAT SERPL-MCNC: 0.85 MG/DL (ref 0.6–1.3)
CREAT SERPL-MCNC: 0.87 MG/DL (ref 0.6–1.3)
CREAT SERPL-MCNC: 0.93 MG/DL (ref 0.6–1.3)
CREAT SERPL-MCNC: 0.93 MG/DL (ref 0.6–1.3)
CREAT SERPL-MCNC: 0.94 MG/DL (ref 0.6–1.3)
CREAT SERPL-MCNC: 0.94 MG/DL (ref 0.6–1.3)
CRP SERPL QL: 46 MG/L
CRP SERPL QL: 49.8 MG/L
D DIMER PPP FEU-MCNC: 1.28 UG/ML FEU
D DIMER PPP FEU-MCNC: 1.38 UG/ML FEU
EOSINOPHIL # BLD AUTO: 0 THOUSAND/ΜL (ref 0–0.61)
EOSINOPHIL # BLD AUTO: 0.01 THOUSAND/ΜL (ref 0–0.61)
EOSINOPHIL # BLD AUTO: 0.03 THOUSAND/ΜL (ref 0–0.61)
EOSINOPHIL # BLD AUTO: 0.04 THOUSAND/ΜL (ref 0–0.61)
EOSINOPHIL # BLD AUTO: 0.05 THOUSAND/ΜL (ref 0–0.61)
EOSINOPHIL # BLD AUTO: 0.13 THOUSAND/ΜL (ref 0–0.61)
EOSINOPHIL # BLD AUTO: 0.15 THOUSAND/ΜL (ref 0–0.61)
EOSINOPHIL NFR BLD AUTO: 0 % (ref 0–6)
EOSINOPHIL NFR BLD AUTO: 0 % (ref 0–6)
EOSINOPHIL NFR BLD AUTO: 1 % (ref 0–6)
EOSINOPHIL NFR BLD AUTO: 2 % (ref 0–6)
ERYTHROCYTE [DISTWIDTH] IN BLOOD BY AUTOMATED COUNT: 13.2 % (ref 11.6–15.1)
ERYTHROCYTE [DISTWIDTH] IN BLOOD BY AUTOMATED COUNT: 13.2 % (ref 11.6–15.1)
ERYTHROCYTE [DISTWIDTH] IN BLOOD BY AUTOMATED COUNT: 13.4 % (ref 11.6–15.1)
ERYTHROCYTE [DISTWIDTH] IN BLOOD BY AUTOMATED COUNT: 13.4 % (ref 11.6–15.1)
ERYTHROCYTE [DISTWIDTH] IN BLOOD BY AUTOMATED COUNT: 14.6 % (ref 11.6–15.1)
ERYTHROCYTE [DISTWIDTH] IN BLOOD BY AUTOMATED COUNT: 15.2 % (ref 11.6–15.1)
ERYTHROCYTE [DISTWIDTH] IN BLOOD BY AUTOMATED COUNT: 15.3 % (ref 11.6–15.1)
ERYTHROCYTE [DISTWIDTH] IN BLOOD BY AUTOMATED COUNT: 16 % (ref 11.6–15.1)
ERYTHROCYTE [DISTWIDTH] IN BLOOD BY AUTOMATED COUNT: 16.1 % (ref 11.6–15.1)
FLUAV RNA RESP QL NAA+PROBE: NEGATIVE
FLUBV RNA RESP QL NAA+PROBE: NEGATIVE
GFR SERPL CREATININE-BSD FRML MDRD: 51 ML/MIN/1.73SQ M
GFR SERPL CREATININE-BSD FRML MDRD: 52 ML/MIN/1.73SQ M
GFR SERPL CREATININE-BSD FRML MDRD: 56 ML/MIN/1.73SQ M
GFR SERPL CREATININE-BSD FRML MDRD: 58 ML/MIN/1.73SQ M
GFR SERPL CREATININE-BSD FRML MDRD: 64 ML/MIN/1.73SQ M
GFR SERPL CREATININE-BSD FRML MDRD: 69 ML/MIN/1.73SQ M
GFR SERPL CREATININE-BSD FRML MDRD: 74 ML/MIN/1.73SQ M
GLUCOSE P FAST SERPL-MCNC: 126 MG/DL (ref 65–99)
GLUCOSE SERPL-MCNC: 100 MG/DL (ref 65–140)
GLUCOSE SERPL-MCNC: 111 MG/DL (ref 65–140)
GLUCOSE SERPL-MCNC: 126 MG/DL (ref 65–140)
GLUCOSE SERPL-MCNC: 163 MG/DL (ref 65–140)
GLUCOSE SERPL-MCNC: 215 MG/DL (ref 65–140)
GLUCOSE SERPL-MCNC: 83 MG/DL (ref 65–140)
GLUCOSE SERPL-MCNC: 83 MG/DL (ref 65–140)
GLUCOSE SERPL-MCNC: 86 MG/DL (ref 65–140)
GLUCOSE SERPL-MCNC: 96 MG/DL (ref 65–140)
GLUCOSE UR STRIP-MCNC: ABNORMAL MG/DL
GLUCOSE UR STRIP-MCNC: NEGATIVE MG/DL
HCT VFR BLD AUTO: 29.1 % (ref 34.8–46.1)
HCT VFR BLD AUTO: 30.5 % (ref 34.8–46.1)
HCT VFR BLD AUTO: 32 % (ref 34.8–46.1)
HCT VFR BLD AUTO: 32.5 % (ref 34.8–46.1)
HCT VFR BLD AUTO: 35.2 % (ref 34.8–46.1)
HCT VFR BLD AUTO: 35.3 % (ref 34.8–46.1)
HCT VFR BLD AUTO: 35.6 % (ref 34.8–46.1)
HCT VFR BLD AUTO: 35.9 % (ref 34.8–46.1)
HCT VFR BLD AUTO: 37.3 % (ref 34.8–46.1)
HGB BLD-MCNC: 10.2 G/DL (ref 11.5–15.4)
HGB BLD-MCNC: 10.9 G/DL (ref 11.5–15.4)
HGB BLD-MCNC: 11.2 G/DL (ref 11.5–15.4)
HGB BLD-MCNC: 11.4 G/DL (ref 11.5–15.4)
HGB BLD-MCNC: 11.6 G/DL (ref 11.5–15.4)
HGB BLD-MCNC: 12 G/DL (ref 11.5–15.4)
HGB BLD-MCNC: 9.4 G/DL (ref 11.5–15.4)
HGB BLD-MCNC: 9.9 G/DL (ref 11.5–15.4)
HGB BLD-MCNC: 9.9 G/DL (ref 11.5–15.4)
HGB UR QL STRIP.AUTO: ABNORMAL
HGB UR QL STRIP.AUTO: NEGATIVE
IMM GRANULOCYTES # BLD AUTO: 0.01 THOUSAND/UL (ref 0–0.2)
IMM GRANULOCYTES # BLD AUTO: 0.01 THOUSAND/UL (ref 0–0.2)
IMM GRANULOCYTES # BLD AUTO: 0.02 THOUSAND/UL (ref 0–0.2)
IMM GRANULOCYTES # BLD AUTO: 0.02 THOUSAND/UL (ref 0–0.2)
IMM GRANULOCYTES # BLD AUTO: 0.03 THOUSAND/UL (ref 0–0.2)
IMM GRANULOCYTES # BLD AUTO: 0.05 THOUSAND/UL (ref 0–0.2)
IMM GRANULOCYTES # BLD AUTO: 0.06 THOUSAND/UL (ref 0–0.2)
IMM GRANULOCYTES NFR BLD AUTO: 0 % (ref 0–2)
IMM GRANULOCYTES NFR BLD AUTO: 1 % (ref 0–2)
IMM GRANULOCYTES NFR BLD AUTO: 1 % (ref 0–2)
KETONES UR STRIP-MCNC: ABNORMAL MG/DL
KETONES UR STRIP-MCNC: NEGATIVE MG/DL
LAAS-AP4: 15.9 CM2
LACTATE SERPL-SCNC: 0.8 MMOL/L (ref 0.5–2)
LACTATE SERPL-SCNC: 2.8 MMOL/L (ref 0.5–2)
LACTATE SERPL-SCNC: 2.9 MMOL/L (ref 0.5–2)
LACTATE SERPL-SCNC: 3.3 MMOL/L (ref 0.5–2)
LACTATE SERPL-SCNC: 6.3 MMOL/L (ref 0.5–2)
LACTATE SERPL-SCNC: 6.5 MMOL/L (ref 0.5–2)
LACTATE SERPL-SCNC: 6.8 MMOL/L (ref 0.5–2)
LACTATE SERPL-SCNC: 6.8 MMOL/L (ref 0.5–2)
LACTATE SERPL-SCNC: 8.2 MMOL/L (ref 0.5–2)
LEUKOCYTE ESTERASE UR QL STRIP: ABNORMAL
LEUKOCYTE ESTERASE UR QL STRIP: NEGATIVE
LIPASE SERPL-CCNC: 6 U/L (ref 11–82)
LYMPHOCYTES # BLD AUTO: 0.45 THOUSANDS/ΜL (ref 0.6–4.47)
LYMPHOCYTES # BLD AUTO: 0.48 THOUSANDS/ΜL (ref 0.6–4.47)
LYMPHOCYTES # BLD AUTO: 0.69 THOUSANDS/ΜL (ref 0.6–4.47)
LYMPHOCYTES # BLD AUTO: 0.72 THOUSANDS/ΜL (ref 0.6–4.47)
LYMPHOCYTES # BLD AUTO: 1.1 THOUSANDS/ΜL (ref 0.6–4.47)
LYMPHOCYTES # BLD AUTO: 1.54 THOUSANDS/ΜL (ref 0.6–4.47)
LYMPHOCYTES # BLD AUTO: 2.43 THOUSANDS/ΜL (ref 0.6–4.47)
LYMPHOCYTES NFR BLD AUTO: 17 % (ref 14–44)
LYMPHOCYTES NFR BLD AUTO: 20 % (ref 14–44)
LYMPHOCYTES NFR BLD AUTO: 23 % (ref 14–44)
LYMPHOCYTES NFR BLD AUTO: 25 % (ref 14–44)
LYMPHOCYTES NFR BLD AUTO: 5 % (ref 14–44)
LYMPHOCYTES NFR BLD AUTO: 8 % (ref 14–44)
LYMPHOCYTES NFR BLD AUTO: 9 % (ref 14–44)
MAGNESIUM SERPL-MCNC: 2.2 MG/DL (ref 1.9–2.7)
MCH RBC QN AUTO: 29.8 PG (ref 26.8–34.3)
MCH RBC QN AUTO: 29.8 PG (ref 26.8–34.3)
MCH RBC QN AUTO: 29.9 PG (ref 26.8–34.3)
MCH RBC QN AUTO: 29.9 PG (ref 26.8–34.3)
MCH RBC QN AUTO: 30 PG (ref 26.8–34.3)
MCH RBC QN AUTO: 30 PG (ref 26.8–34.3)
MCH RBC QN AUTO: 30.2 PG (ref 26.8–34.3)
MCH RBC QN AUTO: 30.4 PG (ref 26.8–34.3)
MCH RBC QN AUTO: 31.2 PG (ref 26.8–34.3)
MCHC RBC AUTO-ENTMCNC: 30.5 G/DL (ref 31.4–37.4)
MCHC RBC AUTO-ENTMCNC: 31 G/DL (ref 31.4–37.4)
MCHC RBC AUTO-ENTMCNC: 31.2 G/DL (ref 31.4–37.4)
MCHC RBC AUTO-ENTMCNC: 31.9 G/DL (ref 31.4–37.4)
MCHC RBC AUTO-ENTMCNC: 32.2 G/DL (ref 31.4–37.4)
MCHC RBC AUTO-ENTMCNC: 32.3 G/DL (ref 31.4–37.4)
MCHC RBC AUTO-ENTMCNC: 32.3 G/DL (ref 31.4–37.4)
MCHC RBC AUTO-ENTMCNC: 32.5 G/DL (ref 31.4–37.4)
MCHC RBC AUTO-ENTMCNC: 32.6 G/DL (ref 31.4–37.4)
MCV RBC AUTO: 103 FL (ref 82–98)
MCV RBC AUTO: 92 FL (ref 82–98)
MCV RBC AUTO: 93 FL (ref 82–98)
MCV RBC AUTO: 94 FL (ref 82–98)
MCV RBC AUTO: 94 FL (ref 82–98)
MCV RBC AUTO: 96 FL (ref 82–98)
MCV RBC AUTO: 97 FL (ref 82–98)
MONOCYTES # BLD AUTO: 0.13 THOUSAND/ΜL (ref 0.17–1.22)
MONOCYTES # BLD AUTO: 0.29 THOUSAND/ΜL (ref 0.17–1.22)
MONOCYTES # BLD AUTO: 0.29 THOUSAND/ΜL (ref 0.17–1.22)
MONOCYTES # BLD AUTO: 0.34 THOUSAND/ΜL (ref 0.17–1.22)
MONOCYTES # BLD AUTO: 0.4 THOUSAND/ΜL (ref 0.17–1.22)
MONOCYTES # BLD AUTO: 0.46 THOUSAND/ΜL (ref 0.17–1.22)
MONOCYTES # BLD AUTO: 0.61 THOUSAND/ΜL (ref 0.17–1.22)
MONOCYTES NFR BLD AUTO: 1 % (ref 4–12)
MONOCYTES NFR BLD AUTO: 3 % (ref 4–12)
MONOCYTES NFR BLD AUTO: 6 % (ref 4–12)
MONOCYTES NFR BLD AUTO: 7 % (ref 4–12)
MONOCYTES NFR BLD AUTO: 7 % (ref 4–12)
MONOCYTES NFR BLD AUTO: 8 % (ref 4–12)
MONOCYTES NFR BLD AUTO: 8 % (ref 4–12)
NEUTROPHILS # BLD AUTO: 2.93 THOUSANDS/ΜL (ref 1.85–7.62)
NEUTROPHILS # BLD AUTO: 3.14 THOUSANDS/ΜL (ref 1.85–7.62)
NEUTROPHILS # BLD AUTO: 5.08 THOUSANDS/ΜL (ref 1.85–7.62)
NEUTROPHILS # BLD AUTO: 5.26 THOUSANDS/ΜL (ref 1.85–7.62)
NEUTROPHILS # BLD AUTO: 6.88 THOUSANDS/ΜL (ref 1.85–7.62)
NEUTROPHILS # BLD AUTO: 7.83 THOUSANDS/ΜL (ref 1.85–7.62)
NEUTROPHILS # BLD AUTO: 8.84 THOUSANDS/ΜL (ref 1.85–7.62)
NEUTS SEG NFR BLD AUTO: 67 % (ref 43–75)
NEUTS SEG NFR BLD AUTO: 70 % (ref 43–75)
NEUTS SEG NFR BLD AUTO: 72 % (ref 43–75)
NEUTS SEG NFR BLD AUTO: 74 % (ref 43–75)
NEUTS SEG NFR BLD AUTO: 84 % (ref 43–75)
NEUTS SEG NFR BLD AUTO: 85 % (ref 43–75)
NEUTS SEG NFR BLD AUTO: 93 % (ref 43–75)
NITRITE UR QL STRIP: NEGATIVE
NITRITE UR QL STRIP: NEGATIVE
NON-SQ EPI CELLS URNS QL MICRO: ABNORMAL /HPF
NRBC BLD AUTO-RTO: 0 /100 WBCS
P AXIS: 36 DEGREES
P AXIS: 56 DEGREES
P AXIS: 63 DEGREES
P AXIS: 69 DEGREES
PH UR STRIP.AUTO: 5.5 [PH]
PH UR STRIP.AUTO: 6 [PH]
PLATELET # BLD AUTO: 153 THOUSANDS/UL (ref 149–390)
PLATELET # BLD AUTO: 157 THOUSANDS/UL (ref 149–390)
PLATELET # BLD AUTO: 159 THOUSANDS/UL (ref 149–390)
PLATELET # BLD AUTO: 159 THOUSANDS/UL (ref 149–390)
PLATELET # BLD AUTO: 166 THOUSANDS/UL (ref 149–390)
PLATELET # BLD AUTO: 185 THOUSANDS/UL (ref 149–390)
PLATELET # BLD AUTO: 205 THOUSANDS/UL (ref 149–390)
PLATELET # BLD AUTO: 205 THOUSANDS/UL (ref 149–390)
PLATELET # BLD AUTO: 239 THOUSANDS/UL (ref 149–390)
PLATELET # BLD AUTO: 266 THOUSANDS/UL (ref 149–390)
PMV BLD AUTO: 10 FL (ref 8.9–12.7)
PMV BLD AUTO: 10.4 FL (ref 8.9–12.7)
PMV BLD AUTO: 10.5 FL (ref 8.9–12.7)
PMV BLD AUTO: 10.5 FL (ref 8.9–12.7)
PMV BLD AUTO: 10.7 FL (ref 8.9–12.7)
PMV BLD AUTO: 10.8 FL (ref 8.9–12.7)
PMV BLD AUTO: 11.1 FL (ref 8.9–12.7)
PMV BLD AUTO: 9.9 FL (ref 8.9–12.7)
POTASSIUM SERPL-SCNC: 3.1 MMOL/L (ref 3.5–5.3)
POTASSIUM SERPL-SCNC: 3.1 MMOL/L (ref 3.5–5.3)
POTASSIUM SERPL-SCNC: 3.3 MMOL/L (ref 3.5–5.3)
POTASSIUM SERPL-SCNC: 3.4 MMOL/L (ref 3.5–5.3)
POTASSIUM SERPL-SCNC: 3.4 MMOL/L (ref 3.5–5.3)
POTASSIUM SERPL-SCNC: 3.9 MMOL/L (ref 3.5–5.3)
POTASSIUM SERPL-SCNC: 4 MMOL/L (ref 3.5–5.3)
POTASSIUM SERPL-SCNC: 4 MMOL/L (ref 3.5–5.3)
POTASSIUM SERPL-SCNC: 4.2 MMOL/L (ref 3.5–5.3)
PR INTERVAL: 148 MS
PR INTERVAL: 150 MS
PR INTERVAL: 154 MS
PR INTERVAL: 160 MS
PROCALCITONIN SERPL-MCNC: 0.05 NG/ML
PROCALCITONIN SERPL-MCNC: 0.08 NG/ML
PROCALCITONIN SERPL-MCNC: 0.13 NG/ML
PROT SERPL-MCNC: 5.3 G/DL (ref 6.4–8.4)
PROT SERPL-MCNC: 6 G/DL (ref 6.4–8.4)
PROT SERPL-MCNC: 6.3 G/DL (ref 6.4–8.4)
PROT SERPL-MCNC: 6.3 G/DL (ref 6.4–8.4)
PROT SERPL-MCNC: 6.9 G/DL (ref 6.4–8.4)
PROT UR STRIP-MCNC: NEGATIVE MG/DL
PROT UR STRIP-MCNC: NEGATIVE MG/DL
QRS AXIS: -13 DEGREES
QRS AXIS: -3 DEGREES
QRS AXIS: -6 DEGREES
QRS AXIS: 14 DEGREES
QRSD INTERVAL: 103 MS
QRSD INTERVAL: 76 MS
QRSD INTERVAL: 84 MS
QRSD INTERVAL: 86 MS
QT INTERVAL: 372 MS
QT INTERVAL: 400 MS
QT INTERVAL: 410 MS
QT INTERVAL: 416 MS
QTC INTERVAL: 432 MS
QTC INTERVAL: 432 MS
QTC INTERVAL: 467 MS
QTC INTERVAL: 484 MS
RBC # BLD AUTO: 3.14 MILLION/UL (ref 3.81–5.12)
RBC # BLD AUTO: 3.17 MILLION/UL (ref 3.81–5.12)
RBC # BLD AUTO: 3.26 MILLION/UL (ref 3.81–5.12)
RBC # BLD AUTO: 3.4 MILLION/UL (ref 3.81–5.12)
RBC # BLD AUTO: 3.63 MILLION/UL (ref 3.81–5.12)
RBC # BLD AUTO: 3.76 MILLION/UL (ref 3.81–5.12)
RBC # BLD AUTO: 3.78 MILLION/UL (ref 3.81–5.12)
RBC # BLD AUTO: 3.89 MILLION/UL (ref 3.81–5.12)
RBC # BLD AUTO: 4.01 MILLION/UL (ref 3.81–5.12)
RBC #/AREA URNS AUTO: ABNORMAL /HPF
RIGHT ATRIAL 2D VOLUME: 33 ML
RIGHT ATRIUM AREA SYSTOLE A4C: 13.6 CM2
RIGHT VENTRICLE ID DIMENSION: 3.5 CM
RSV RNA RESP QL NAA+PROBE: NEGATIVE
SARS-COV-2 RNA RESP QL NAA+PROBE: POSITIVE
SL AMB  POCT GLUCOSE, UA: NORMAL
SL AMB LEUKOCYTE ESTERASE,UA: 500
SL AMB POCT BILIRUBIN,UA: NORMAL
SL AMB POCT BLOOD,UA: 50
SL AMB POCT CLARITY,UA: CLEAR
SL AMB POCT COLOR,UA: YELLOW
SL AMB POCT KETONES,UA: 15
SL AMB POCT NITRITE,UA: NORMAL
SL AMB POCT PH,UA: 5
SL AMB POCT SPECIFIC GRAVITY,UA: 1.02
SL AMB POCT URINE PROTEIN: NORMAL
SL AMB POCT UROBILINOGEN: NORMAL
SL CV LV EF: 60
SODIUM SERPL-SCNC: 136 MMOL/L (ref 135–147)
SODIUM SERPL-SCNC: 138 MMOL/L (ref 135–147)
SODIUM SERPL-SCNC: 138 MMOL/L (ref 135–147)
SODIUM SERPL-SCNC: 140 MMOL/L (ref 135–147)
SODIUM SERPL-SCNC: 140 MMOL/L (ref 135–147)
SODIUM SERPL-SCNC: 141 MMOL/L (ref 135–147)
SODIUM SERPL-SCNC: 144 MMOL/L (ref 135–147)
SODIUM SERPL-SCNC: 145 MMOL/L (ref 135–147)
SODIUM SERPL-SCNC: 146 MMOL/L (ref 135–147)
SP GR UR STRIP.AUTO: 1.02 (ref 1–1.03)
SP GR UR STRIP.AUTO: <=1.005 (ref 1–1.03)
T WAVE AXIS: 61 DEGREES
T WAVE AXIS: 76 DEGREES
T WAVE AXIS: 79 DEGREES
T WAVE AXIS: 83 DEGREES
TR MAX PG: 20 MMHG
TR PEAK VELOCITY: 2.3 M/S
TRICUSPID VALVE PEAK REGURGITATION VELOCITY: 2.26 M/S
TSH SERPL DL<=0.05 MIU/L-ACNC: 2.01 UIU/ML (ref 0.45–4.5)
UROBILINOGEN UR QL STRIP.AUTO: 0.2 E.U./DL
UROBILINOGEN UR QL STRIP.AUTO: 0.2 E.U./DL
VENTRICULAR RATE: 65 BPM
VENTRICULAR RATE: 70 BPM
VENTRICULAR RATE: 84 BPM
VENTRICULAR RATE: 95 BPM
WBC # BLD AUTO: 10.77 THOUSAND/UL (ref 4.31–10.16)
WBC # BLD AUTO: 11.85 THOUSAND/UL (ref 4.31–10.16)
WBC # BLD AUTO: 4.25 THOUSAND/UL (ref 4.31–10.16)
WBC # BLD AUTO: 4.38 THOUSAND/UL (ref 4.31–10.16)
WBC # BLD AUTO: 5.97 THOUSAND/UL (ref 4.31–10.16)
WBC # BLD AUTO: 7.58 THOUSAND/UL (ref 4.31–10.16)
WBC # BLD AUTO: 8.13 THOUSAND/UL (ref 4.31–10.16)
WBC # BLD AUTO: 9.52 THOUSAND/UL (ref 4.31–10.16)
WBC # BLD AUTO: 9.56 THOUSAND/UL (ref 4.31–10.16)
WBC #/AREA URNS AUTO: ABNORMAL /HPF

## 2022-01-01 PROCEDURE — 99284 EMERGENCY DEPT VISIT MOD MDM: CPT

## 2022-01-01 PROCEDURE — G0299 HHS/HOSPICE OF RN EA 15 MIN: HCPCS

## 2022-01-01 PROCEDURE — 85049 AUTOMATED PLATELET COUNT: CPT | Performed by: INTERNAL MEDICINE

## 2022-01-01 PROCEDURE — 94640 AIRWAY INHALATION TREATMENT: CPT

## 2022-01-01 PROCEDURE — 83605 ASSAY OF LACTIC ACID: CPT | Performed by: INTERNAL MEDICINE

## 2022-01-01 PROCEDURE — 85379 FIBRIN DEGRADATION QUANT: CPT | Performed by: INTERNAL MEDICINE

## 2022-01-01 PROCEDURE — 81003 URINALYSIS AUTO W/O SCOPE: CPT | Performed by: INTERNAL MEDICINE

## 2022-01-01 PROCEDURE — 80053 COMPREHEN METABOLIC PANEL: CPT | Performed by: EMERGENCY MEDICINE

## 2022-01-01 PROCEDURE — 83605 ASSAY OF LACTIC ACID: CPT | Performed by: PHYSICIAN ASSISTANT

## 2022-01-01 PROCEDURE — T2042 HOSPICE ROUTINE HOME CARE: HCPCS

## 2022-01-01 PROCEDURE — 83690 ASSAY OF LIPASE: CPT | Performed by: PHYSICIAN ASSISTANT

## 2022-01-01 PROCEDURE — 93321 DOPPLER ECHO F-UP/LMTD STD: CPT | Performed by: INTERNAL MEDICINE

## 2022-01-01 PROCEDURE — 71045 X-RAY EXAM CHEST 1 VIEW: CPT

## 2022-01-01 PROCEDURE — 70450 CT HEAD/BRAIN W/O DYE: CPT

## 2022-01-01 PROCEDURE — 80048 BASIC METABOLIC PNL TOTAL CA: CPT | Performed by: INTERNAL MEDICINE

## 2022-01-01 PROCEDURE — 99232 SBSQ HOSP IP/OBS MODERATE 35: CPT | Performed by: STUDENT IN AN ORGANIZED HEALTH CARE EDUCATION/TRAINING PROGRAM

## 2022-01-01 PROCEDURE — 99222 1ST HOSP IP/OBS MODERATE 55: CPT | Performed by: INTERNAL MEDICINE

## 2022-01-01 PROCEDURE — 0241U HB NFCT DS VIR RESP RNA 4 TRGT: CPT | Performed by: PHYSICIAN ASSISTANT

## 2022-01-01 PROCEDURE — 99285 EMERGENCY DEPT VISIT HI MDM: CPT

## 2022-01-01 PROCEDURE — 93005 ELECTROCARDIOGRAM TRACING: CPT

## 2022-01-01 PROCEDURE — 84484 ASSAY OF TROPONIN QUANT: CPT | Performed by: PHYSICIAN ASSISTANT

## 2022-01-01 PROCEDURE — 36415 COLL VENOUS BLD VENIPUNCTURE: CPT | Performed by: PHYSICIAN ASSISTANT

## 2022-01-01 PROCEDURE — 97535 SELF CARE MNGMENT TRAINING: CPT

## 2022-01-01 PROCEDURE — 96361 HYDRATE IV INFUSION ADD-ON: CPT

## 2022-01-01 PROCEDURE — G0155 HHCP-SVS OF CSW,EA 15 MIN: HCPCS

## 2022-01-01 PROCEDURE — 94760 N-INVAS EAR/PLS OXIMETRY 1: CPT

## 2022-01-01 PROCEDURE — 80053 COMPREHEN METABOLIC PANEL: CPT

## 2022-01-01 PROCEDURE — 99213 OFFICE O/P EST LOW 20 MIN: CPT | Performed by: FAMILY MEDICINE

## 2022-01-01 PROCEDURE — 99220 PR INITIAL OBSERVATION CARE/DAY 70 MINUTES: CPT | Performed by: INTERNAL MEDICINE

## 2022-01-01 PROCEDURE — 96374 THER/PROPH/DIAG INJ IV PUSH: CPT

## 2022-01-01 PROCEDURE — 86140 C-REACTIVE PROTEIN: CPT

## 2022-01-01 PROCEDURE — 10330087 HSPC SERVICE INTENSITY ADD-ON

## 2022-01-01 PROCEDURE — 10330064 BRIEF, WINGS CHOICE+ QUILTED LG (18/BG 4

## 2022-01-01 PROCEDURE — 83880 ASSAY OF NATRIURETIC PEPTIDE: CPT | Performed by: PHYSICIAN ASSISTANT

## 2022-01-01 PROCEDURE — 99232 SBSQ HOSP IP/OBS MODERATE 35: CPT | Performed by: INTERNAL MEDICINE

## 2022-01-01 PROCEDURE — 36415 COLL VENOUS BLD VENIPUNCTURE: CPT | Performed by: EMERGENCY MEDICINE

## 2022-01-01 PROCEDURE — 10330057 MEDICATION, GENERAL

## 2022-01-01 PROCEDURE — 99285 EMERGENCY DEPT VISIT HI MDM: CPT | Performed by: EMERGENCY MEDICINE

## 2022-01-01 PROCEDURE — G0156 HHCP-SVS OF AIDE,EA 15 MIN: HCPCS

## 2022-01-01 PROCEDURE — 81001 URINALYSIS AUTO W/SCOPE: CPT | Performed by: EMERGENCY MEDICINE

## 2022-01-01 PROCEDURE — 73502 X-RAY EXAM HIP UNI 2-3 VIEWS: CPT

## 2022-01-01 PROCEDURE — 93308 TTE F-UP OR LMTD: CPT

## 2022-01-01 PROCEDURE — 94664 DEMO&/EVAL PT USE INHALER: CPT

## 2022-01-01 PROCEDURE — 99316 NF DSCHRG MGMT 30 MIN+: CPT | Performed by: NURSE PRACTITIONER

## 2022-01-01 PROCEDURE — 93325 DOPPLER ECHO COLOR FLOW MAPG: CPT | Performed by: INTERNAL MEDICINE

## 2022-01-01 PROCEDURE — 97167 OT EVAL HIGH COMPLEX 60 MIN: CPT

## 2022-01-01 PROCEDURE — 85027 COMPLETE CBC AUTOMATED: CPT | Performed by: INTERNAL MEDICINE

## 2022-01-01 PROCEDURE — 84145 PROCALCITONIN (PCT): CPT

## 2022-01-01 PROCEDURE — 85025 COMPLETE CBC W/AUTO DIFF WBC: CPT | Performed by: INTERNAL MEDICINE

## 2022-01-01 PROCEDURE — 99309 SBSQ NF CARE MODERATE MDM 30: CPT | Performed by: NURSE PRACTITIONER

## 2022-01-01 PROCEDURE — 86140 C-REACTIVE PROTEIN: CPT | Performed by: INTERNAL MEDICINE

## 2022-01-01 PROCEDURE — 85379 FIBRIN DEGRADATION QUANT: CPT

## 2022-01-01 PROCEDURE — 81003 URINALYSIS AUTO W/O SCOPE: CPT | Performed by: FAMILY MEDICINE

## 2022-01-01 PROCEDURE — 97163 PT EVAL HIGH COMPLEX 45 MIN: CPT

## 2022-01-01 PROCEDURE — 99285 EMERGENCY DEPT VISIT HI MDM: CPT | Performed by: PHYSICIAN ASSISTANT

## 2022-01-01 PROCEDURE — 84145 PROCALCITONIN (PCT): CPT | Performed by: INTERNAL MEDICINE

## 2022-01-01 PROCEDURE — 1111F DSCHRG MED/CURRENT MED MERGE: CPT | Performed by: FAMILY MEDICINE

## 2022-01-01 PROCEDURE — 83605 ASSAY OF LACTIC ACID: CPT | Performed by: EMERGENCY MEDICINE

## 2022-01-01 PROCEDURE — 83735 ASSAY OF MAGNESIUM: CPT | Performed by: INTERNAL MEDICINE

## 2022-01-01 PROCEDURE — 85025 COMPLETE CBC W/AUTO DIFF WBC: CPT

## 2022-01-01 PROCEDURE — 84484 ASSAY OF TROPONIN QUANT: CPT | Performed by: EMERGENCY MEDICINE

## 2022-01-01 PROCEDURE — G0425 INPT/ED TELECONSULT30: HCPCS | Performed by: NURSE PRACTITIONER

## 2022-01-01 PROCEDURE — 97116 GAIT TRAINING THERAPY: CPT

## 2022-01-01 PROCEDURE — 99496 TRANSJ CARE MGMT HIGH F2F 7D: CPT | Performed by: FAMILY MEDICINE

## 2022-01-01 PROCEDURE — 10330064

## 2022-01-01 PROCEDURE — 99232 SBSQ HOSP IP/OBS MODERATE 35: CPT

## 2022-01-01 PROCEDURE — 93010 ELECTROCARDIOGRAM REPORT: CPT | Performed by: INTERNAL MEDICINE

## 2022-01-01 PROCEDURE — 99306 1ST NF CARE HIGH MDM 50: CPT | Performed by: FAMILY MEDICINE

## 2022-01-01 PROCEDURE — 99239 HOSP IP/OBS DSCHRG MGMT >30: CPT

## 2022-01-01 PROCEDURE — 84145 PROCALCITONIN (PCT): CPT | Performed by: EMERGENCY MEDICINE

## 2022-01-01 PROCEDURE — 82550 ASSAY OF CK (CPK): CPT | Performed by: EMERGENCY MEDICINE

## 2022-01-01 PROCEDURE — 92526 ORAL FUNCTION THERAPY: CPT

## 2022-01-01 PROCEDURE — 80053 COMPREHEN METABOLIC PANEL: CPT | Performed by: PHYSICIAN ASSISTANT

## 2022-01-01 PROCEDURE — 93308 TTE F-UP OR LMTD: CPT | Performed by: INTERNAL MEDICINE

## 2022-01-01 PROCEDURE — 92610 EVALUATE SWALLOWING FUNCTION: CPT

## 2022-01-01 PROCEDURE — 84443 ASSAY THYROID STIM HORMONE: CPT | Performed by: EMERGENCY MEDICINE

## 2022-01-01 PROCEDURE — 85025 COMPLETE CBC W/AUTO DIFF WBC: CPT | Performed by: EMERGENCY MEDICINE

## 2022-01-01 PROCEDURE — 97530 THERAPEUTIC ACTIVITIES: CPT

## 2022-01-01 PROCEDURE — 85025 COMPLETE CBC W/AUTO DIFF WBC: CPT | Performed by: PHYSICIAN ASSISTANT

## 2022-01-01 PROCEDURE — G1004 CDSM NDSC: HCPCS

## 2022-01-01 PROCEDURE — 84484 ASSAY OF TROPONIN QUANT: CPT

## 2022-01-01 PROCEDURE — 82550 ASSAY OF CK (CPK): CPT | Performed by: PHYSICIAN ASSISTANT

## 2022-01-01 PROCEDURE — 80053 COMPREHEN METABOLIC PANEL: CPT | Performed by: INTERNAL MEDICINE

## 2022-01-01 RX ORDER — ACETAMINOPHEN 325 MG/1
650 TABLET ORAL EVERY 6 HOURS PRN
Refills: 0
Start: 2022-01-01 | End: 2022-01-01 | Stop reason: ALTCHOICE

## 2022-01-01 RX ORDER — MAGNESIUM HYDROXIDE/ALUMINUM HYDROXICE/SIMETHICONE 120; 1200; 1200 MG/30ML; MG/30ML; MG/30ML
30 SUSPENSION ORAL EVERY 6 HOURS PRN
Status: DISCONTINUED | OUTPATIENT
Start: 2022-01-01 | End: 2022-01-01 | Stop reason: HOSPADM

## 2022-01-01 RX ORDER — CEPHALEXIN 250 MG/1
500 CAPSULE ORAL EVERY 12 HOURS SCHEDULED
Qty: 20 CAPSULE | Refills: 0 | Status: SHIPPED | OUTPATIENT
Start: 2022-01-01 | End: 2022-01-01

## 2022-01-01 RX ORDER — LEVALBUTEROL INHALATION SOLUTION 1.25 MG/3ML
1.25 SOLUTION RESPIRATORY (INHALATION)
Status: DISCONTINUED | OUTPATIENT
Start: 2022-01-01 | End: 2022-01-01

## 2022-01-01 RX ORDER — CEPHALEXIN 250 MG/1
250 CAPSULE ORAL ONCE
Status: COMPLETED | OUTPATIENT
Start: 2022-01-01 | End: 2022-01-01

## 2022-01-01 RX ORDER — GABAPENTIN 100 MG/1
100 CAPSULE ORAL 3 TIMES DAILY
COMMUNITY
End: 2022-01-01 | Stop reason: HOSPADM

## 2022-01-01 RX ORDER — PRAVASTATIN SODIUM 20 MG
10 TABLET ORAL DAILY
Status: DISCONTINUED | OUTPATIENT
Start: 2022-01-01 | End: 2022-01-01 | Stop reason: HOSPADM

## 2022-01-01 RX ORDER — HEPARIN SODIUM 5000 [USP'U]/ML
5000 INJECTION, SOLUTION INTRAVENOUS; SUBCUTANEOUS EVERY 8 HOURS SCHEDULED
Status: DISCONTINUED | OUTPATIENT
Start: 2022-01-01 | End: 2022-01-01 | Stop reason: HOSPADM

## 2022-01-01 RX ORDER — ASPIRIN 81 MG/1
81 TABLET ORAL DAILY
Refills: 0
Start: 2022-01-01 | End: 2022-01-01 | Stop reason: ALTCHOICE

## 2022-01-01 RX ORDER — SODIUM CHLORIDE, SODIUM GLUCONATE, SODIUM ACETATE, POTASSIUM CHLORIDE, MAGNESIUM CHLORIDE, SODIUM PHOSPHATE, DIBASIC, AND POTASSIUM PHOSPHATE .53; .5; .37; .037; .03; .012; .00082 G/100ML; G/100ML; G/100ML; G/100ML; G/100ML; G/100ML; G/100ML
75 INJECTION, SOLUTION INTRAVENOUS CONTINUOUS
Status: DISCONTINUED | OUTPATIENT
Start: 2022-01-01 | End: 2022-01-01

## 2022-01-01 RX ORDER — IPRATROPIUM BROMIDE AND ALBUTEROL SULFATE .5; 3 MG/3ML; MG/3ML
1 SOLUTION RESPIRATORY (INHALATION) ONCE
Status: COMPLETED | OUTPATIENT
Start: 2022-01-01 | End: 2022-01-01

## 2022-01-01 RX ORDER — PANTOPRAZOLE SODIUM 40 MG/1
40 TABLET, DELAYED RELEASE ORAL
Refills: 1 | Status: DISCONTINUED | OUTPATIENT
Start: 2022-01-01 | End: 2022-01-01 | Stop reason: HOSPADM

## 2022-01-01 RX ORDER — NICOTINE POLACRILEX 2 MG
1 LOZENGE BUCCAL DAILY
Qty: 30 TABLET | Refills: 0 | Status: SHIPPED | OUTPATIENT
Start: 2022-01-01 | End: 2022-01-01

## 2022-01-01 RX ORDER — CIPROFLOXACIN 500 MG/1
500 TABLET, FILM COATED ORAL EVERY 12 HOURS SCHEDULED
Qty: 14 TABLET | Refills: 0 | Status: SHIPPED | OUTPATIENT
Start: 2022-01-01 | End: 2022-01-01

## 2022-01-01 RX ORDER — OLANZAPINE 10 MG/1
2.5 INJECTION, POWDER, LYOPHILIZED, FOR SOLUTION INTRAMUSCULAR ONCE
Status: COMPLETED | OUTPATIENT
Start: 2022-01-01 | End: 2022-01-01

## 2022-01-01 RX ORDER — METHYLPREDNISOLONE SODIUM SUCCINATE 125 MG/2ML
125 INJECTION, POWDER, LYOPHILIZED, FOR SOLUTION INTRAMUSCULAR; INTRAVENOUS ONCE
Status: COMPLETED | OUTPATIENT
Start: 2022-01-01 | End: 2022-01-01

## 2022-01-01 RX ORDER — QUETIAPINE FUMARATE 25 MG/1
12.5 TABLET, FILM COATED ORAL
Status: DISCONTINUED | OUTPATIENT
Start: 2022-01-01 | End: 2022-01-01

## 2022-01-01 RX ORDER — QUETIAPINE FUMARATE 25 MG/1
25 TABLET, FILM COATED ORAL
Status: DISCONTINUED | OUTPATIENT
Start: 2022-01-01 | End: 2022-01-01

## 2022-01-01 RX ORDER — LORAZEPAM 2 MG/ML
CONCENTRATE ORAL
Qty: 15 ML | Refills: 0 | Status: SHIPPED | OUTPATIENT
Start: 2022-01-01 | End: 2022-01-01 | Stop reason: SDUPTHER

## 2022-01-01 RX ORDER — DEXTROSE AND SODIUM CHLORIDE 5; .9 G/100ML; G/100ML
50 INJECTION, SOLUTION INTRAVENOUS CONTINUOUS
Status: DISCONTINUED | OUTPATIENT
Start: 2022-01-01 | End: 2022-01-01 | Stop reason: HOSPADM

## 2022-01-01 RX ORDER — LORAZEPAM 2 MG/ML
0.5 INJECTION INTRAMUSCULAR EVERY 6 HOURS PRN
Status: DISCONTINUED | OUTPATIENT
Start: 2022-01-01 | End: 2022-01-01

## 2022-01-01 RX ORDER — SODIUM CHLORIDE, SODIUM GLUCONATE, SODIUM ACETATE, POTASSIUM CHLORIDE, MAGNESIUM CHLORIDE, SODIUM PHOSPHATE, DIBASIC, AND POTASSIUM PHOSPHATE .53; .5; .37; .037; .03; .012; .00082 G/100ML; G/100ML; G/100ML; G/100ML; G/100ML; G/100ML; G/100ML
1000 INJECTION, SOLUTION INTRAVENOUS ONCE
Status: COMPLETED | OUTPATIENT
Start: 2022-01-01 | End: 2022-01-01

## 2022-01-01 RX ORDER — POTASSIUM CHLORIDE 20MEQ/15ML
40 LIQUID (ML) ORAL ONCE
Status: COMPLETED | OUTPATIENT
Start: 2022-01-01 | End: 2022-01-01

## 2022-01-01 RX ORDER — ACETAMINOPHEN 325 MG/1
650 TABLET ORAL EVERY 4 HOURS PRN
Status: DISCONTINUED | OUTPATIENT
Start: 2022-01-01 | End: 2022-01-01 | Stop reason: HOSPADM

## 2022-01-01 RX ORDER — LANOLIN ALCOHOL/MO/W.PET/CERES
3 CREAM (GRAM) TOPICAL
Qty: 14 TABLET | Refills: 0 | Status: SHIPPED | OUTPATIENT
Start: 2022-01-01 | End: 2022-01-01

## 2022-01-01 RX ORDER — ONDANSETRON 2 MG/ML
4 INJECTION INTRAMUSCULAR; INTRAVENOUS EVERY 6 HOURS PRN
Status: DISCONTINUED | OUTPATIENT
Start: 2022-01-01 | End: 2022-01-01 | Stop reason: HOSPADM

## 2022-01-01 RX ORDER — CEPHALEXIN 500 MG/1
500 CAPSULE ORAL EVERY 12 HOURS SCHEDULED
Qty: 14 CAPSULE | Refills: 0 | Status: SHIPPED | OUTPATIENT
Start: 2022-01-01 | End: 2022-01-01

## 2022-01-01 RX ORDER — SODIUM CHLORIDE, SODIUM LACTATE, POTASSIUM CHLORIDE, CALCIUM CHLORIDE 600; 310; 30; 20 MG/100ML; MG/100ML; MG/100ML; MG/100ML
75 INJECTION, SOLUTION INTRAVENOUS CONTINUOUS
Status: DISCONTINUED | OUTPATIENT
Start: 2022-01-01 | End: 2022-01-01

## 2022-01-01 RX ORDER — HALOPERIDOL 5 MG/ML
2 INJECTION INTRAMUSCULAR EVERY 6 HOURS PRN
Status: DISCONTINUED | OUTPATIENT
Start: 2022-01-01 | End: 2022-01-01

## 2022-01-01 RX ORDER — HALOPERIDOL 5 MG/ML
1 INJECTION INTRAMUSCULAR EVERY 6 HOURS PRN
Status: DISCONTINUED | OUTPATIENT
Start: 2022-01-01 | End: 2022-01-01

## 2022-01-01 RX ORDER — POTASSIUM CHLORIDE 20MEQ/15ML
40 LIQUID (ML) ORAL ONCE
Status: DISCONTINUED | OUTPATIENT
Start: 2022-01-01 | End: 2022-01-01

## 2022-01-01 RX ORDER — IPRATROPIUM BROMIDE AND ALBUTEROL SULFATE 2.5; .5 MG/3ML; MG/3ML
3 SOLUTION RESPIRATORY (INHALATION)
Status: DISCONTINUED | OUTPATIENT
Start: 2022-01-01 | End: 2022-01-01

## 2022-01-01 RX ORDER — QUETIAPINE FUMARATE 25 MG/1
12.5 TABLET, FILM COATED ORAL
Status: DISCONTINUED | OUTPATIENT
Start: 2022-01-01 | End: 2022-01-01 | Stop reason: HOSPADM

## 2022-01-01 RX ORDER — ALBUTEROL SULFATE 2.5 MG/3ML
1 SOLUTION RESPIRATORY (INHALATION) ONCE
Status: COMPLETED | OUTPATIENT
Start: 2022-01-01 | End: 2022-01-01

## 2022-01-01 RX ORDER — WATER 1000 ML/1000ML
INJECTION, SOLUTION INTRAVENOUS
Status: DISPENSED
Start: 2022-01-01 | End: 2022-01-01

## 2022-01-01 RX ORDER — HALOPERIDOL 5 MG/ML
1 INJECTION INTRAMUSCULAR EVERY 6 HOURS PRN
Status: DISCONTINUED | OUTPATIENT
Start: 2022-01-01 | End: 2022-01-01 | Stop reason: HOSPADM

## 2022-01-01 RX ORDER — TRAMADOL HYDROCHLORIDE 50 MG/1
50 TABLET ORAL EVERY 6 HOURS PRN
COMMUNITY
End: 2022-01-01 | Stop reason: HOSPADM

## 2022-01-01 RX ORDER — NICOTINE POLACRILEX 2 MG
1 LOZENGE BUCCAL DAILY
COMMUNITY
End: 2022-01-01 | Stop reason: SDUPTHER

## 2022-01-01 RX ORDER — TRAMADOL HYDROCHLORIDE 50 MG/1
50 TABLET ORAL EVERY 8 HOURS PRN
Status: DISCONTINUED | OUTPATIENT
Start: 2022-01-01 | End: 2022-01-01 | Stop reason: HOSPADM

## 2022-01-01 RX ORDER — FLUTICASONE FUROATE AND VILANTEROL 100; 25 UG/1; UG/1
1 POWDER RESPIRATORY (INHALATION) DAILY
Status: DISCONTINUED | OUTPATIENT
Start: 2022-01-01 | End: 2022-01-01 | Stop reason: HOSPADM

## 2022-01-01 RX ORDER — GABAPENTIN 100 MG/1
100 CAPSULE ORAL 4 TIMES DAILY
Status: DISCONTINUED | OUTPATIENT
Start: 2022-01-01 | End: 2022-01-01 | Stop reason: HOSPADM

## 2022-01-01 RX ORDER — LANOLIN ALCOHOL/MO/W.PET/CERES
3 CREAM (GRAM) TOPICAL
Refills: 0
Start: 2022-01-01 | End: 2022-01-01 | Stop reason: SDUPTHER

## 2022-01-01 RX ORDER — HEPARIN SODIUM 5000 [USP'U]/ML
5000 INJECTION, SOLUTION INTRAVENOUS; SUBCUTANEOUS EVERY 8 HOURS SCHEDULED
Status: DISCONTINUED | OUTPATIENT
Start: 2022-01-01 | End: 2022-01-01

## 2022-01-01 RX ORDER — QUETIAPINE FUMARATE 25 MG/1
12.5 TABLET, FILM COATED ORAL ONCE
Status: DISCONTINUED | OUTPATIENT
Start: 2022-01-01 | End: 2022-01-01

## 2022-01-01 RX ORDER — ACETAMINOPHEN 325 MG/1
650 TABLET ORAL EVERY 6 HOURS PRN
Status: DISCONTINUED | OUTPATIENT
Start: 2022-01-01 | End: 2022-01-01 | Stop reason: HOSPADM

## 2022-01-01 RX ORDER — ASPIRIN 81 MG/1
81 TABLET ORAL DAILY
Status: DISCONTINUED | OUTPATIENT
Start: 2022-01-01 | End: 2022-01-01

## 2022-01-01 RX ORDER — ALBUTEROL SULFATE 2.5 MG/3ML
2.5 SOLUTION RESPIRATORY (INHALATION) EVERY 6 HOURS PRN
Status: DISCONTINUED | OUTPATIENT
Start: 2022-01-01 | End: 2022-01-01 | Stop reason: HOSPADM

## 2022-01-01 RX ORDER — MORPHINE SULFATE 100 MG/5ML
SOLUTION, CONCENTRATE ORAL
Qty: 15 ML | Refills: 0 | Status: SHIPPED | OUTPATIENT
Start: 2022-01-01

## 2022-01-01 RX ORDER — POTASSIUM CHLORIDE 14.9 MG/ML
20 INJECTION INTRAVENOUS ONCE
Status: COMPLETED | OUTPATIENT
Start: 2022-01-01 | End: 2022-01-01

## 2022-01-01 RX ORDER — TRAZODONE HYDROCHLORIDE 50 MG/1
50 TABLET ORAL ONCE
Status: COMPLETED | OUTPATIENT
Start: 2022-01-01 | End: 2022-01-01

## 2022-01-01 RX ORDER — CEFTRIAXONE 1 G/50ML
1000 INJECTION, SOLUTION INTRAVENOUS EVERY 24 HOURS
Status: DISCONTINUED | OUTPATIENT
Start: 2022-01-01 | End: 2022-01-01

## 2022-01-01 RX ORDER — QUETIAPINE FUMARATE 25 MG/1
12.5 TABLET, FILM COATED ORAL
Qty: 15 TABLET | Refills: 0 | Status: SHIPPED | OUTPATIENT
Start: 2022-01-01 | End: 2022-01-01 | Stop reason: ALTCHOICE

## 2022-01-01 RX ORDER — TRAMADOL HYDROCHLORIDE 50 MG/1
TABLET ORAL
Qty: 60 TABLET | Refills: 5 | Status: SHIPPED | OUTPATIENT
Start: 2022-01-01 | End: 2022-01-01

## 2022-01-01 RX ORDER — LANOLIN ALCOHOL/MO/W.PET/CERES
3 CREAM (GRAM) TOPICAL
Status: DISCONTINUED | OUTPATIENT
Start: 2022-01-01 | End: 2022-01-01 | Stop reason: HOSPADM

## 2022-01-01 RX ORDER — ENOXAPARIN SODIUM 100 MG/ML
30 INJECTION SUBCUTANEOUS EVERY 12 HOURS SCHEDULED
Status: DISCONTINUED | OUTPATIENT
Start: 2022-01-01 | End: 2022-01-01

## 2022-01-01 RX ORDER — ALBUTEROL SULFATE 90 UG/1
2 AEROSOL, METERED RESPIRATORY (INHALATION) EVERY 4 HOURS PRN
Status: DISCONTINUED | OUTPATIENT
Start: 2022-01-01 | End: 2022-01-01 | Stop reason: HOSPADM

## 2022-01-01 RX ORDER — POTASSIUM CHLORIDE 20 MEQ/1
40 TABLET, EXTENDED RELEASE ORAL ONCE
Status: COMPLETED | OUTPATIENT
Start: 2022-01-01 | End: 2022-01-01

## 2022-01-01 RX ORDER — GABAPENTIN 100 MG/1
100 CAPSULE ORAL 3 TIMES DAILY
Status: DISCONTINUED | OUTPATIENT
Start: 2022-01-01 | End: 2022-01-01 | Stop reason: HOSPADM

## 2022-01-01 RX ORDER — LORAZEPAM 2 MG/ML
CONCENTRATE ORAL
Qty: 30 ML | Refills: 0 | Status: SHIPPED | OUTPATIENT
Start: 2022-01-01

## 2022-01-01 RX ORDER — POTASSIUM CHLORIDE 20 MEQ/1
20 TABLET, EXTENDED RELEASE ORAL ONCE
Status: COMPLETED | OUTPATIENT
Start: 2022-01-01 | End: 2022-01-01

## 2022-01-01 RX ORDER — ASPIRIN 325 MG
325 TABLET ORAL ONCE
Status: COMPLETED | OUTPATIENT
Start: 2022-01-01 | End: 2022-01-01

## 2022-01-01 RX ORDER — CEFTRIAXONE 1 G/50ML
1000 INJECTION, SOLUTION INTRAVENOUS EVERY 24 HOURS
Status: DISCONTINUED | OUTPATIENT
Start: 2022-01-01 | End: 2022-01-01 | Stop reason: HOSPADM

## 2022-01-01 RX ORDER — ASPIRIN 81 MG/1
81 TABLET ORAL DAILY
Status: DISCONTINUED | OUTPATIENT
Start: 2022-01-01 | End: 2022-01-01 | Stop reason: HOSPADM

## 2022-01-01 RX ORDER — TRAMADOL HYDROCHLORIDE 50 MG/1
50 TABLET ORAL EVERY 6 HOURS PRN
Status: DISCONTINUED | OUTPATIENT
Start: 2022-01-01 | End: 2022-01-01

## 2022-01-01 RX ADMIN — IPRATROPIUM BROMIDE AND ALBUTEROL SULFATE 3 ML: 2.5; .5 SOLUTION RESPIRATORY (INHALATION) at 20:13

## 2022-01-01 RX ADMIN — HALOPERIDOL LACTATE 1 MG: 5 INJECTION, SOLUTION INTRAMUSCULAR at 08:25

## 2022-01-01 RX ADMIN — ACETAMINOPHEN 650 MG: 325 TABLET, FILM COATED ORAL at 10:10

## 2022-01-01 RX ADMIN — FLUTICASONE FUROATE AND VILANTEROL TRIFENATATE 1 PUFF: 200; 25 POWDER RESPIRATORY (INHALATION) at 10:05

## 2022-01-01 RX ADMIN — GABAPENTIN 100 MG: 100 CAPSULE ORAL at 08:35

## 2022-01-01 RX ADMIN — ACETAMINOPHEN 650 MG: 325 TABLET, FILM COATED ORAL at 08:07

## 2022-01-01 RX ADMIN — FLUTICASONE FUROATE AND VILANTEROL TRIFENATATE 1 PUFF: 200; 25 POWDER RESPIRATORY (INHALATION) at 08:19

## 2022-01-01 RX ADMIN — POTASSIUM CHLORIDE 20 MEQ: 14.9 INJECTION, SOLUTION INTRAVENOUS at 11:30

## 2022-01-01 RX ADMIN — HEPARIN SODIUM 5000 UNITS: 5000 INJECTION INTRAVENOUS; SUBCUTANEOUS at 13:57

## 2022-01-01 RX ADMIN — QUETIAPINE FUMARATE 12.5 MG: 25 TABLET ORAL at 03:11

## 2022-01-01 RX ADMIN — HEPARIN SODIUM 5000 UNITS: 5000 INJECTION INTRAVENOUS; SUBCUTANEOUS at 05:04

## 2022-01-01 RX ADMIN — GABAPENTIN 100 MG: 100 CAPSULE ORAL at 01:20

## 2022-01-01 RX ADMIN — QUETIAPINE FUMARATE 12.5 MG: 25 TABLET ORAL at 21:36

## 2022-01-01 RX ADMIN — HEPARIN SODIUM 5000 UNITS: 5000 INJECTION INTRAVENOUS; SUBCUTANEOUS at 21:08

## 2022-01-01 RX ADMIN — IPRATROPIUM BROMIDE 0.5 MG: 0.5 SOLUTION RESPIRATORY (INHALATION) at 13:44

## 2022-01-01 RX ADMIN — CEFTRIAXONE 1000 MG: 1 INJECTION, SOLUTION INTRAVENOUS at 22:08

## 2022-01-01 RX ADMIN — LEVALBUTEROL HYDROCHLORIDE 1.25 MG: 1.25 SOLUTION RESPIRATORY (INHALATION) at 19:35

## 2022-01-01 RX ADMIN — HEPARIN SODIUM 5000 UNITS: 5000 INJECTION INTRAVENOUS; SUBCUTANEOUS at 21:47

## 2022-01-01 RX ADMIN — ACETAMINOPHEN 650 MG: 325 TABLET, FILM COATED ORAL at 21:03

## 2022-01-01 RX ADMIN — SODIUM CHLORIDE, SODIUM GLUCONATE, SODIUM ACETATE, POTASSIUM CHLORIDE, MAGNESIUM CHLORIDE, SODIUM PHOSPHATE, DIBASIC, AND POTASSIUM PHOSPHATE 125 ML/HR: .53; .5; .37; .037; .03; .012; .00082 INJECTION, SOLUTION INTRAVENOUS at 08:49

## 2022-01-01 RX ADMIN — HEPARIN SODIUM 5000 UNITS: 5000 INJECTION INTRAVENOUS; SUBCUTANEOUS at 05:47

## 2022-01-01 RX ADMIN — OLANZAPINE 2.5 MG: 10 INJECTION, POWDER, FOR SOLUTION INTRAMUSCULAR at 04:45

## 2022-01-01 RX ADMIN — HEPARIN SODIUM 5000 UNITS: 5000 INJECTION INTRAVENOUS; SUBCUTANEOUS at 22:21

## 2022-01-01 RX ADMIN — HEPARIN SODIUM 5000 UNITS: 5000 INJECTION INTRAVENOUS; SUBCUTANEOUS at 05:39

## 2022-01-01 RX ADMIN — PRAVASTATIN SODIUM 10 MG: 20 TABLET ORAL at 08:07

## 2022-01-01 RX ADMIN — PANTOPRAZOLE SODIUM 40 MG: 40 TABLET, DELAYED RELEASE ORAL at 05:38

## 2022-01-01 RX ADMIN — HEPARIN SODIUM 5000 UNITS: 5000 INJECTION INTRAVENOUS; SUBCUTANEOUS at 22:08

## 2022-01-01 RX ADMIN — HEPARIN SODIUM 5000 UNITS: 5000 INJECTION INTRAVENOUS; SUBCUTANEOUS at 05:55

## 2022-01-01 RX ADMIN — IPRATROPIUM BROMIDE AND ALBUTEROL SULFATE 3 ML: 2.5; .5 SOLUTION RESPIRATORY (INHALATION) at 16:02

## 2022-01-01 RX ADMIN — QUETIAPINE FUMARATE 12.5 MG: 25 TABLET ORAL at 21:41

## 2022-01-01 RX ADMIN — FLUTICASONE FUROATE AND VILANTEROL TRIFENATATE 1 PUFF: 200; 25 POWDER RESPIRATORY (INHALATION) at 09:29

## 2022-01-01 RX ADMIN — GABAPENTIN 100 MG: 100 CAPSULE ORAL at 22:21

## 2022-01-01 RX ADMIN — HEPARIN SODIUM 5000 UNITS: 5000 INJECTION INTRAVENOUS; SUBCUTANEOUS at 14:34

## 2022-01-01 RX ADMIN — IPRATROPIUM BROMIDE 0.5 MG: 0.5 SOLUTION RESPIRATORY (INHALATION) at 15:00

## 2022-01-01 RX ADMIN — ASPIRIN 81 MG: 81 TABLET, COATED ORAL at 09:53

## 2022-01-01 RX ADMIN — TRAMADOL HYDROCHLORIDE 50 MG: 50 TABLET, COATED ORAL at 10:50

## 2022-01-01 RX ADMIN — GABAPENTIN 100 MG: 100 CAPSULE ORAL at 09:39

## 2022-01-01 RX ADMIN — HEPARIN SODIUM 5000 UNITS: 5000 INJECTION INTRAVENOUS; SUBCUTANEOUS at 22:07

## 2022-01-01 RX ADMIN — ACETAMINOPHEN 650 MG: 325 TABLET, FILM COATED ORAL at 01:19

## 2022-01-01 RX ADMIN — GABAPENTIN 100 MG: 100 CAPSULE ORAL at 18:19

## 2022-01-01 RX ADMIN — CEFTRIAXONE 1000 MG: 1 INJECTION, SOLUTION INTRAVENOUS at 21:09

## 2022-01-01 RX ADMIN — HEPARIN SODIUM 5000 UNITS: 5000 INJECTION INTRAVENOUS; SUBCUTANEOUS at 05:45

## 2022-01-01 RX ADMIN — HALOPERIDOL LACTATE 1 MG: 5 INJECTION, SOLUTION INTRAMUSCULAR at 13:03

## 2022-01-01 RX ADMIN — Medication 3 MG: at 21:47

## 2022-01-01 RX ADMIN — SODIUM CHLORIDE, SODIUM GLUCONATE, SODIUM ACETATE, POTASSIUM CHLORIDE, MAGNESIUM CHLORIDE, SODIUM PHOSPHATE, DIBASIC, AND POTASSIUM PHOSPHATE 75 ML/HR: .53; .5; .37; .037; .03; .012; .00082 INJECTION, SOLUTION INTRAVENOUS at 10:51

## 2022-01-01 RX ADMIN — ASPIRIN 81 MG: 81 TABLET, COATED ORAL at 08:07

## 2022-01-01 RX ADMIN — GABAPENTIN 100 MG: 100 CAPSULE ORAL at 15:49

## 2022-01-01 RX ADMIN — SODIUM CHLORIDE, SODIUM GLUCONATE, SODIUM ACETATE, POTASSIUM CHLORIDE, MAGNESIUM CHLORIDE, SODIUM PHOSPHATE, DIBASIC, AND POTASSIUM PHOSPHATE 1000 ML: .53; .5; .37; .037; .03; .012; .00082 INJECTION, SOLUTION INTRAVENOUS at 23:05

## 2022-01-01 RX ADMIN — HEPARIN SODIUM 5000 UNITS: 5000 INJECTION INTRAVENOUS; SUBCUTANEOUS at 05:50

## 2022-01-01 RX ADMIN — TRAZODONE HYDROCHLORIDE 50 MG: 50 TABLET ORAL at 21:41

## 2022-01-01 RX ADMIN — POTASSIUM CHLORIDE 40 MEQ: 20 SOLUTION ORAL at 08:37

## 2022-01-01 RX ADMIN — GABAPENTIN 100 MG: 100 CAPSULE ORAL at 11:31

## 2022-01-01 RX ADMIN — ASPIRIN 81 MG: 81 TABLET, COATED ORAL at 09:39

## 2022-01-01 RX ADMIN — ASPIRIN 81 MG: 81 TABLET ORAL at 08:41

## 2022-01-01 RX ADMIN — LEVALBUTEROL HYDROCHLORIDE 1.25 MG: 1.25 SOLUTION RESPIRATORY (INHALATION) at 07:46

## 2022-01-01 RX ADMIN — SODIUM CHLORIDE, SODIUM LACTATE, POTASSIUM CHLORIDE, AND CALCIUM CHLORIDE 75 ML/HR: .6; .31; .03; .02 INJECTION, SOLUTION INTRAVENOUS at 09:40

## 2022-01-01 RX ADMIN — ASPIRIN 81 MG: 81 TABLET ORAL at 10:57

## 2022-01-01 RX ADMIN — HEPARIN SODIUM 5000 UNITS: 5000 INJECTION INTRAVENOUS; SUBCUTANEOUS at 05:37

## 2022-01-01 RX ADMIN — HEPARIN SODIUM 5000 UNITS: 5000 INJECTION INTRAVENOUS; SUBCUTANEOUS at 14:06

## 2022-01-01 RX ADMIN — GABAPENTIN 100 MG: 100 CAPSULE ORAL at 20:26

## 2022-01-01 RX ADMIN — FLUTICASONE FUROATE AND VILANTEROL TRIFENATATE 1 PUFF: 200; 25 POWDER RESPIRATORY (INHALATION) at 09:10

## 2022-01-01 RX ADMIN — HEPARIN SODIUM 5000 UNITS: 5000 INJECTION INTRAVENOUS; SUBCUTANEOUS at 21:39

## 2022-01-01 RX ADMIN — GABAPENTIN 100 MG: 100 CAPSULE ORAL at 08:37

## 2022-01-01 RX ADMIN — GABAPENTIN 100 MG: 100 CAPSULE ORAL at 20:21

## 2022-01-01 RX ADMIN — GABAPENTIN 100 MG: 100 CAPSULE ORAL at 08:52

## 2022-01-01 RX ADMIN — Medication 3 MG: at 21:03

## 2022-01-01 RX ADMIN — GABAPENTIN 100 MG: 100 CAPSULE ORAL at 13:57

## 2022-01-01 RX ADMIN — GABAPENTIN 100 MG: 100 CAPSULE ORAL at 21:56

## 2022-01-01 RX ADMIN — HEPARIN SODIUM 5000 UNITS: 5000 INJECTION INTRAVENOUS; SUBCUTANEOUS at 14:02

## 2022-01-01 RX ADMIN — HALOPERIDOL LACTATE 2 MG: 5 INJECTION, SOLUTION INTRAMUSCULAR at 15:52

## 2022-01-01 RX ADMIN — QUETIAPINE FUMARATE 12.5 MG: 25 TABLET ORAL at 21:09

## 2022-01-01 RX ADMIN — METHYLPREDNISOLONE SODIUM SUCCINATE 125 MG: 125 INJECTION, POWDER, FOR SOLUTION INTRAMUSCULAR; INTRAVENOUS at 16:00

## 2022-01-01 RX ADMIN — CEFTRIAXONE 1000 MG: 1 INJECTION, SOLUTION INTRAVENOUS at 22:07

## 2022-01-01 RX ADMIN — HEPARIN SODIUM 5000 UNITS: 5000 INJECTION INTRAVENOUS; SUBCUTANEOUS at 21:41

## 2022-01-01 RX ADMIN — IPRATROPIUM BROMIDE 0.5 MG: 0.5 SOLUTION RESPIRATORY (INHALATION) at 07:46

## 2022-01-01 RX ADMIN — PANTOPRAZOLE SODIUM 40 MG: 40 TABLET, DELAYED RELEASE ORAL at 05:50

## 2022-01-01 RX ADMIN — ASPIRIN 81 MG: 81 TABLET ORAL at 08:52

## 2022-01-01 RX ADMIN — SODIUM CHLORIDE, SODIUM GLUCONATE, SODIUM ACETATE, POTASSIUM CHLORIDE, MAGNESIUM CHLORIDE, SODIUM PHOSPHATE, DIBASIC, AND POTASSIUM PHOSPHATE 75 ML/HR: .53; .5; .37; .037; .03; .012; .00082 INJECTION, SOLUTION INTRAVENOUS at 12:54

## 2022-01-01 RX ADMIN — TRAMADOL HYDROCHLORIDE 50 MG: 50 TABLET, COATED ORAL at 16:44

## 2022-01-01 RX ADMIN — GABAPENTIN 100 MG: 100 CAPSULE ORAL at 21:47

## 2022-01-01 RX ADMIN — DEXTROSE AND SODIUM CHLORIDE 50 ML/HR: 5; .9 INJECTION, SOLUTION INTRAVENOUS at 13:53

## 2022-01-01 RX ADMIN — GABAPENTIN 100 MG: 100 CAPSULE ORAL at 12:55

## 2022-01-01 RX ADMIN — SODIUM CHLORIDE 1000 ML: 9 INJECTION, SOLUTION INTRAVENOUS at 19:11

## 2022-01-01 RX ADMIN — Medication 3 MG: at 01:20

## 2022-01-01 RX ADMIN — HALOPERIDOL LACTATE 2 MG: 5 INJECTION, SOLUTION INTRAMUSCULAR at 08:52

## 2022-01-01 RX ADMIN — SODIUM CHLORIDE, SODIUM GLUCONATE, SODIUM ACETATE, POTASSIUM CHLORIDE, MAGNESIUM CHLORIDE, SODIUM PHOSPHATE, DIBASIC, AND POTASSIUM PHOSPHATE 75 ML/HR: .53; .5; .37; .037; .03; .012; .00082 INJECTION, SOLUTION INTRAVENOUS at 21:24

## 2022-01-01 RX ADMIN — GABAPENTIN 100 MG: 100 CAPSULE ORAL at 18:08

## 2022-01-01 RX ADMIN — GABAPENTIN 100 MG: 100 CAPSULE ORAL at 15:55

## 2022-01-01 RX ADMIN — ASPIRIN 81 MG: 81 TABLET ORAL at 08:35

## 2022-01-01 RX ADMIN — GABAPENTIN 100 MG: 100 CAPSULE ORAL at 18:00

## 2022-01-01 RX ADMIN — POTASSIUM CHLORIDE 20 MEQ: 1500 TABLET, EXTENDED RELEASE ORAL at 20:21

## 2022-01-01 RX ADMIN — QUETIAPINE FUMARATE 25 MG: 25 TABLET ORAL at 22:07

## 2022-01-01 RX ADMIN — GABAPENTIN 100 MG: 100 CAPSULE ORAL at 09:53

## 2022-01-01 RX ADMIN — GABAPENTIN 100 MG: 100 CAPSULE ORAL at 10:57

## 2022-01-01 RX ADMIN — PRAVASTATIN SODIUM 10 MG: 20 TABLET ORAL at 09:53

## 2022-01-01 RX ADMIN — GABAPENTIN 100 MG: 100 CAPSULE ORAL at 21:03

## 2022-01-01 RX ADMIN — DEXTROSE AND SODIUM CHLORIDE 50 ML/HR: 5; .9 INJECTION, SOLUTION INTRAVENOUS at 07:57

## 2022-01-01 RX ADMIN — CEFTRIAXONE 1000 MG: 1 INJECTION, SOLUTION INTRAVENOUS at 20:26

## 2022-01-01 RX ADMIN — GABAPENTIN 100 MG: 100 CAPSULE ORAL at 17:04

## 2022-01-01 RX ADMIN — PANTOPRAZOLE SODIUM 40 MG: 40 TABLET, DELAYED RELEASE ORAL at 06:07

## 2022-01-01 RX ADMIN — GABAPENTIN 100 MG: 100 CAPSULE ORAL at 16:30

## 2022-01-01 RX ADMIN — FLUTICASONE FUROATE AND VILANTEROL TRIFENATATE 1 PUFF: 100; 25 POWDER RESPIRATORY (INHALATION) at 09:51

## 2022-01-01 RX ADMIN — HEPARIN SODIUM 5000 UNITS: 5000 INJECTION INTRAVENOUS; SUBCUTANEOUS at 21:48

## 2022-01-01 RX ADMIN — CEFTRIAXONE 1000 MG: 1 INJECTION, SOLUTION INTRAVENOUS at 21:50

## 2022-01-01 RX ADMIN — CYANOCOBALAMIN TAB 500 MCG 1000 MCG: 500 TAB at 08:07

## 2022-01-01 RX ADMIN — GABAPENTIN 100 MG: 100 CAPSULE ORAL at 08:07

## 2022-01-01 RX ADMIN — CEFTRIAXONE 1000 MG: 1 INJECTION, SOLUTION INTRAVENOUS at 22:21

## 2022-01-01 RX ADMIN — IPRATROPIUM BROMIDE 0.5 MG: 0.5 SOLUTION RESPIRATORY (INHALATION) at 19:35

## 2022-01-01 RX ADMIN — SODIUM CHLORIDE, SODIUM GLUCONATE, SODIUM ACETATE, POTASSIUM CHLORIDE, MAGNESIUM CHLORIDE, SODIUM PHOSPHATE, DIBASIC, AND POTASSIUM PHOSPHATE 75 ML/HR: .53; .5; .37; .037; .03; .012; .00082 INJECTION, SOLUTION INTRAVENOUS at 20:28

## 2022-01-01 RX ADMIN — GABAPENTIN 100 MG: 100 CAPSULE ORAL at 10:06

## 2022-01-01 RX ADMIN — LEVALBUTEROL HYDROCHLORIDE 1.25 MG: 1.25 SOLUTION RESPIRATORY (INHALATION) at 15:00

## 2022-01-01 RX ADMIN — HEPARIN SODIUM 5000 UNITS: 5000 INJECTION INTRAVENOUS; SUBCUTANEOUS at 13:00

## 2022-01-01 RX ADMIN — FLUTICASONE FUROATE AND VILANTEROL TRIFENATATE 1 PUFF: 100; 25 POWDER RESPIRATORY (INHALATION) at 09:39

## 2022-01-01 RX ADMIN — QUETIAPINE FUMARATE 12.5 MG: 25 TABLET ORAL at 22:08

## 2022-01-01 RX ADMIN — GABAPENTIN 100 MG: 100 CAPSULE ORAL at 10:50

## 2022-01-01 RX ADMIN — FLUTICASONE FUROATE AND VILANTEROL TRIFENATATE 1 PUFF: 100; 25 POWDER RESPIRATORY (INHALATION) at 09:49

## 2022-01-01 RX ADMIN — SODIUM CHLORIDE, SODIUM GLUCONATE, SODIUM ACETATE, POTASSIUM CHLORIDE, MAGNESIUM CHLORIDE, SODIUM PHOSPHATE, DIBASIC, AND POTASSIUM PHOSPHATE 125 ML/HR: .53; .5; .37; .037; .03; .012; .00082 INJECTION, SOLUTION INTRAVENOUS at 05:46

## 2022-01-01 RX ADMIN — GABAPENTIN 100 MG: 100 CAPSULE ORAL at 21:09

## 2022-01-01 RX ADMIN — SODIUM CHLORIDE, SODIUM LACTATE, POTASSIUM CHLORIDE, AND CALCIUM CHLORIDE 75 ML/HR: .6; .31; .03; .02 INJECTION, SOLUTION INTRAVENOUS at 18:48

## 2022-01-01 RX ADMIN — SODIUM CHLORIDE 500 ML: 0.9 INJECTION, SOLUTION INTRAVENOUS at 15:50

## 2022-01-01 RX ADMIN — HEPARIN SODIUM 5000 UNITS: 5000 INJECTION INTRAVENOUS; SUBCUTANEOUS at 13:07

## 2022-01-01 RX ADMIN — GABAPENTIN 100 MG: 100 CAPSULE ORAL at 16:44

## 2022-01-01 RX ADMIN — HEPARIN SODIUM 5000 UNITS: 5000 INJECTION INTRAVENOUS; SUBCUTANEOUS at 21:03

## 2022-01-01 RX ADMIN — HEPARIN SODIUM 5000 UNITS: 5000 INJECTION INTRAVENOUS; SUBCUTANEOUS at 14:21

## 2022-01-01 RX ADMIN — CEPHALEXIN 250 MG: 250 CAPSULE ORAL at 16:57

## 2022-01-01 RX ADMIN — GABAPENTIN 100 MG: 100 CAPSULE ORAL at 22:08

## 2022-01-01 RX ADMIN — SODIUM CHLORIDE, SODIUM GLUCONATE, SODIUM ACETATE, POTASSIUM CHLORIDE, MAGNESIUM CHLORIDE, SODIUM PHOSPHATE, DIBASIC, AND POTASSIUM PHOSPHATE 75 ML/HR: .53; .5; .37; .037; .03; .012; .00082 INJECTION, SOLUTION INTRAVENOUS at 00:02

## 2022-01-01 RX ADMIN — FLUTICASONE FUROATE AND VILANTEROL TRIFENATATE 1 PUFF: 200; 25 POWDER RESPIRATORY (INHALATION) at 09:06

## 2022-01-01 RX ADMIN — ASPIRIN 325 MG ORAL TABLET 325 MG: 325 PILL ORAL at 18:52

## 2022-01-01 RX ADMIN — HEPARIN SODIUM 5000 UNITS: 5000 INJECTION INTRAVENOUS; SUBCUTANEOUS at 18:53

## 2022-01-01 RX ADMIN — HEPARIN SODIUM 5000 UNITS: 5000 INJECTION INTRAVENOUS; SUBCUTANEOUS at 06:07

## 2022-01-01 RX ADMIN — PRAVASTATIN SODIUM 10 MG: 20 TABLET ORAL at 09:40

## 2022-01-01 RX ADMIN — POTASSIUM CHLORIDE 40 MEQ: 1500 TABLET, EXTENDED RELEASE ORAL at 11:31

## 2022-01-01 RX ADMIN — CYANOCOBALAMIN TAB 500 MCG 1000 MCG: 500 TAB at 09:39

## 2022-01-01 RX ADMIN — FLUTICASONE FUROATE AND VILANTEROL TRIFENATATE 1 PUFF: 200; 25 POWDER RESPIRATORY (INHALATION) at 08:52

## 2022-01-01 RX ADMIN — SODIUM CHLORIDE, SODIUM GLUCONATE, SODIUM ACETATE, POTASSIUM CHLORIDE, MAGNESIUM CHLORIDE, SODIUM PHOSPHATE, DIBASIC, AND POTASSIUM PHOSPHATE 125 ML/HR: .53; .5; .37; .037; .03; .012; .00082 INJECTION, SOLUTION INTRAVENOUS at 22:10

## 2022-01-01 RX ADMIN — CYANOCOBALAMIN TAB 500 MCG 1000 MCG: 500 TAB at 09:53

## 2022-01-01 RX ADMIN — LEVALBUTEROL HYDROCHLORIDE 1.25 MG: 1.25 SOLUTION RESPIRATORY (INHALATION) at 13:44

## 2022-04-19 NOTE — PROGRESS NOTES
Outpatient Note- Acute    HPI:     James Gallo , 80 y o  female  presents today for UTI symptoms  According to the patient's daughter that accompanies her today her symptoms started on Saturday  She had increased frequency starting on Saturday that progressed to increased frequency and pain with urination  She denies any systemic symptoms but is concerned about the burning and irritation she is having during and after urination  She denies fever, chills, nausea, vomiting, diarrhea, constipation, rigors, chest pain, shortness of breath, lightheadedness or dizziness  Past Medical History:   Diagnosis Date    Arthritis     Asthma     Cancer ([facility] )     Chondrocalcinosis     Chronic sciatica     Dementia (HCC)     Diverticulosis     GERD (gastroesophageal reflux disease)     Gout     High blood pressure     History of low potassium     Hyperlipidemia     Insomnia     Iron deficiency anemia     Low vitamin D level     Myocardial infarction (HCC)     Neuropathy     Stage 3a chronic kidney disease ([facility] ) 11/16/2021    Uterine cancer (HCC)       ROS:   Review of Systems   See HPI    OBJECTIVE  Vitals:    04/19/22 1531   BP: 120/60   Pulse: 80   Resp: 16   SpO2: 94%        Physical Exam  Constitutional:       General: She is not in acute distress  Appearance: Normal appearance  She is normal weight  She is not ill-appearing, toxic-appearing or diaphoretic  HENT:      Head: Normocephalic and atraumatic  Cardiovascular:      Rate and Rhythm: Normal rate and regular rhythm  Pulses: Normal pulses  Heart sounds: Normal heart sounds  No murmur heard  No friction rub  No gallop  Pulmonary:      Effort: Pulmonary effort is normal  No respiratory distress  Breath sounds: Normal breath sounds  No stridor  No wheezing, rhonchi or rales  Abdominal:      General: Bowel sounds are normal  There is no distension  Palpations: Abdomen is soft  Tenderness:  There is no abdominal tenderness  There is no right CVA tenderness or left CVA tenderness  Skin:     General: Skin is warm and dry  Capillary Refill: Capillary refill takes less than 2 seconds  ASSESSMENT AND PLAN   Radha Rivas was seen today for urinary symptoms  Diagnoses and all orders for this visit:    Dysuria  Urinary frequency  Burning with urination  Patient has typical symptoms of UTI with evidence of infection on urine dip  The patient has 500 leuks, blood, mild protein, and ketones  Will treat empirically for UTI with keflex, avoided macrobid 2/2 to age and kidney function  Discussed culture and possible need to change medication with patient and daughter  Reviewed red flag symptoms and when to go to the ED    -     POCT urine dip  -     cephalexin (KEFLEX) 500 mg capsule; Take 1 capsule (500 mg total) by mouth every 12 (twelve) hours for 7 days  -     Urine culture;  Future        Roma Hidalgo DO  [facility]  4/19/2022 4:13 PM

## 2022-04-19 NOTE — PATIENT INSTRUCTIONS
Please start taking the Keflex 500 mg twice a day for 7 days     Please continue to monitor your symptoms start having any feelings of increased pain, discomfort, back pain, nausea, vomiting, rigors, chills, fever please go to the emergency room that is closest to you  I will send the urine to the lab  If for any reason the medication we sent for you is not covered, I will send any medication over to the pharmacy  If this is necessary I will call you let you know  Dysuria   WHAT YOU NEED TO KNOW:   What is dysuria? Dysuria is difficulty urinating, or pain, burning, or discomfort when you urinate  Dysuria is usually a symptom of another problem  What causes dysuria? The following are the most common causes of dysuria:  · Infections, such as urinary tract infections and sexually transmitted infections     · Trauma, such as bicycle injury or sexual abuse     · Abnormal structure, such as narrowing of the urethra     · Blockage, such as kidney stones     · Medical conditions, such as constipation, enlarged prostate, and reactive arthritis     · Chemicals, such as douches, spermicides, and bubble bath     · Medicines, such as chemotherapy    What increases my risk for dysuria? · Dehydration     · Loss of bladder muscle strength due to older age     · Holding urine in your bladder for a long period of time     · Caffeine, soda, alcohol, and citrus drinks    What other symptoms may I have with dysuria? · Fever     · Cloudy, bad smelling urine     · Urge to urinate often but urinating little     · Back, side, or abdominal pain     · Blood in your urine     · Discharge that smells bad     · Itching     · Swelling of your genitals     · Pain with ejaculation or bowel movement (for males)    How is dysuria diagnosed? Your healthcare provider will examine you and ask about your symptoms  Tell your healthcare provider about any medicines you are taking   You may need any of the following to find the cause of your dysuria:  · A urine test  may be done to look for bacteria, blood, or pus  · A blood test  may be done to look for signs of infection  · A cystoscopy  allows healthcare providers to look for problems inside your bladder  A scope is put into your bladder through your urethra  The scope is a flexible tube with a light and camera on the end  · An ultrasound  uses sound waves to show pictures on a monitor  An ultrasound may be done to show problems in your bladder  How is dysuria treated? Treatment will depend on what is causing your dysuria  Your healthcare provider may refer you to a specialist, such as a urologist or a nephrologist  Kirsty Figueroa may need medicines to help treat a bacterial infection or help decrease bladder spasms  How can I manage my dysuria? · Drink more liquids  Liquids help flush out bacteria that may be causing an infection  Ask your healthcare provider how much liquid to drink each day and which liquids are best for you  · Take sitz baths as directed  Fill a bathtub with 4 to 6 inches of warm water  You may also use a sitz bath pan that fits over a toilet  Sit in the sitz bath for 20 minutes  Do this 2 to 3 times a day, or as directed  The warm water can help decrease pain and swelling  When should I seek immediate care? · You have severe back, side, or abdominal pain  · You have fever and shaking chills  · You vomit several times in a row  When should I contact my healthcare provider? · Your symptoms do not go away, even after treatment  · You have questions or concerns about your condition or care  CARE AGREEMENT:   You have the right to help plan your care  Learn about your health condition and how it may be treated  Discuss treatment options with your healthcare providers to decide what care you want to receive  You always have the right to refuse treatment  The above information is an  only   It is not intended as medical advice for individual conditions or treatments  Talk to your doctor, nurse or pharmacist before following any medical regimen to see if it is safe and effective for you  © Copyright [employer] [facility] 2022 Information is for End User's use only and may not be sold, redistributed or otherwise used for commercial purposes   All illustrations and images included in CareNotes® are the copyrighted property of A D A M , Inc  or [address] [place] [address]

## 2022-04-22 NOTE — TELEPHONE ENCOUNTER
Called daughter of the patient and informed her that there was no evidence of a UTI  The patient has one more days worth of medication  Therefore we will finish the course since it seems to have helped and the patient is not having significant side effects such as diarrhea  She is to consider a gyn evaluation if no improvement in her symptoms        Billy Langley DO  [facility]  4/22/2022 5:42 PM

## 2022-06-07 PROBLEM — R77.8 ELEVATED TROPONIN: Status: ACTIVE | Noted: 2022-01-01

## 2022-06-07 PROBLEM — U07.1 COVID-19: Status: ACTIVE | Noted: 2022-01-01

## 2022-06-07 NOTE — H&P
Aron U  66   H&P- Fabiano Lou 7/4/1927, 80 y o  female MRN: [id number]  Unit/Bed#: -01 Encounter: [id number]  Primary Care Provider: Juan Richards MD   Date and time admitted to hospital: 6/7/2022  3:02 PM    * COVID-19  Assessment & Plan  · Patient presents with generalized weakness and decreased appetite and cough and fever as per the daughter for the past 1 week  Patient tested positive for COVID-19  · Patient has been vaccinated with 2 doses and also the daughter tested positive who lives with the mother  · Patient is on room air saturating about 95%  · Patient denies of shortness of breath  · Not a candidate for IV steroid or remdesivir  Also not a candidate for oral anti covid  with medication as symptoms are present for more than a week  · Monitor O2 saturation  Elevated troponin  Assessment & Plan  · Likely non MI related-patient denies of chest pain  EKG shows no acute ST-T changes and sinus rhythm noted  · Will trend troponin  Cardiology was contacted from the ED and recommended to give aspirin and no acute intervention at this time  Alzheimer's disease, early onset [facility])  Assessment & Plan  · Continue supportive care and monitor mental status    Asthma  Assessment & Plan    stable, continue inhalers    Essential hypertension  Assessment & Plan  Continue statin  VTE Pharmacologic Prophylaxis:   Moderate Risk (Score 3-4) - Pharmacological DVT Prophylaxis Ordered: enoxaparin (Lovenox)  Code Status: Level 3 - DNAR and DNI   Discussion with family: Updated  (daughter) at bedside  Anticipated Length of Stay: Patient will be admitted on an inpatient basis with an anticipated length of stay of greater than 2 midnights secondary to covid positive   Total Time for Visit, including Counseling / Coordination of Care: 45 minutes Greater than 50% of this total time spent on direct patient counseling and coordination of care      Chief Complaint: generalized weakness    History of Present Illness:  Mitzy Lewis is a 80 y o  female with a PMH of bronchial asthma, hypertension, hyperlipidemia, presents with increasing generalized weakness and decreased appetite and poor p o  Intake for the past 1 week  Patient also noted to have the low-grade fever and chills with cough which was dry  Patient denies of chest pain or shortness of breath  Patient denies of any headache or blurry vision or dizziness  Patient and her daughter tested positive for COVID-19 today  Patient has been vaccinated with 2 doses against COVID-19  Patient has no nausea or vomiting but had episode of diarrhea 1 episode  Patient denies of abdominal pain or bladder or bowel incontinence  Patient is able to ambulate well without walker or a cane and has no recent falls  Review of Systems:  Review of Systems   Constitutional: Positive for activity change, appetite change, fatigue and fever  HENT: Negative  Eyes: Negative  Respiratory: Positive for cough  Cardiovascular: Negative  Gastrointestinal: Positive for diarrhea  Endocrine: Negative  Genitourinary: Negative  Musculoskeletal: Negative  Skin: Negative  Allergic/Immunologic: Negative  Neurological: Negative  Hematological: Negative  Psychiatric/Behavioral: Negative          Past Medical and Surgical History:   Past Medical History:   Diagnosis Date    Arthritis     Asthma     Cancer ([facility] )     Chondrocalcinosis     Chronic sciatica     [facility] ([facility] )     Diverticulosis     GERD (gastroesophageal reflux disease)     Gout     High blood pressure     History of low potassium     Hyperlipidemia     Insomnia     Iron deficiency anemia     Low vitamin D level     Myocardial infarction (HCC)     Neuropathy     Stage [facility]a [facility] ) 11/16/2021    Uterine cancer ([facility] )        Past Surgical History:   Procedure Laterality Date    APPENDECTOMY      CATARACT EXTRACTION Right     COLONOSCOPY      2012?  EGD  04/2019    upper- Hiatal hernia    ESOPHAGOGASTRODUODENOSCOPY      HYSTERECTOMY      TONSILLECTOMY         Meds/Allergies:  Prior to Admission medications    Medication Sig Start Date End Date Taking? Authorizing Provider   Breo Ellipta 100-25 MCG/INH inhaler INHALE ONE PUFF DAILY  RINSE MOUTH AFTER USE 1/3/22   Gabriel Horner MD   cyanocobalamin (VITAMIN B-12) 1,000 mcg tablet Take 1,000 mcg by mouth daily  Historical Provider, MD   Dexilant 60 MG capsule TAKE ONE CAPSULE BY MOUTH EVERY DAY 3/7/22   Gabriel Horner MD   gabapentin (NEURONTIN) 100 mg capsule TAKE 1 CAPSULE BY MOUTH UP TO FOUR TIMES A DAY 4/13/21   Gabriel Horner MD   hydrocortisone 2 5 % ointment Apply topically 2 (two) times a day  Patient not taking: Reported on 4/19/2022 12/20/18   Miguel Oconnor PA-C   pravastatin (PRAVACHOL) 10 mg tablet TAKE ONE TABLET BY MOUTH EVERY DAY 10/4/21   Gabriel Horner MD   ProAir  (07 Base) MCG/ACT inhaler INHALE TWO PUFFS BY MOUTH EVERY 4 HOURS AS NEEDED  Patient not taking: Reported on 4/19/2022 8/26/20   Gabriel Horner MD   traMADol Yaneth Ricardo) 50 mg tablet TAKE ONE TABLET BY MOUTH TWICE A DAY 1/10/22   Gabriel Horner MD     I have reviewed home medications with patient personally  Allergies: No Known Allergies    Social History:  Marital Status:     Occupation: retired  Patient Pre-hospital Living Situation: Home  Patient Pre-hospital Level of Mobility: walks  Patient Pre-hospital Diet Restrictions: nil  Substance Use History:   Social History     Substance and Sexual Activity   Alcohol Use No     Social History     Tobacco Use   Smoking Status Never Smoker   Smokeless Tobacco Never Used     Social History     Substance and Sexual Activity   Drug Use No       Family History:  Family History   Problem Relation Age of Onset    Cancer Sister     Stroke Sister     Heart disease Brother        Physical Exam:     Vitals:   Blood Pressure: 137/63 (06/07/22 1736)  Pulse: 82 (06/07/22 1736)  Temperature: 100 °F (37 8 °C) (06/07/22 1736)  Temp Source: Tympanic (06/07/22 1736)  Respirations: 16 (06/07/22 1736)  Height: 5' (152 4 cm) (06/07/22 1736)  Weight - Scale: 60 4 kg (133 lb 2 5 oz) (06/07/22 1736)  SpO2: 98 % (06/07/22 1736)    Physical Exam   HEENT-PERRLA, dry oral mucosa  Neck-supple, no JVD elevation   Respiratory-equal air entry bilaterally, no rales or rhonchi  Cardiovascular system-S1, S2 heard, no murmur or gallops or rubs  Abdomen-soft, nontender, no guarding or rigidity, bowel sounds heard  Extremities-no pedal edema  Peripheral pulses palpable  Musculoskeletal-no contractures  Central nervous system-no acute focal neurological deficit ,no sensory or motor deficit noted  Skin-no rash noted        Additional Data:     Lab Results:  Results from last 7 days   Lab Units 06/07/22  1542   WBC Thousand/uL 8 13   HEMOGLOBIN g/dL 12 0   HEMATOCRIT % 37 3   PLATELETS Thousands/uL 266   NEUTROS PCT % 84*   LYMPHS PCT % 9*   MONOS PCT % 6   EOS PCT % 1     Results from last 7 days   Lab Units 06/07/22  1542   SODIUM mmol/L 138   POTASSIUM mmol/L 4 2   CHLORIDE mmol/L 102   CO2 mmol/L 24   BUN mg/dL 15   CREATININE mg/dL 0 93   ANION GAP mmol/L 12   CALCIUM mg/dL 9 3   ALBUMIN g/dL 3 7   TOTAL BILIRUBIN mg/dL 0 71   ALK PHOS U/L 65   ALT U/L 12   AST U/L 17   GLUCOSE RANDOM mg/dL 96                 Results from last 7 days   Lab Units 06/07/22  1542   LACTIC ACID mmol/L 0 8       Imaging: Reviewed radiology reports from this admission including: chest xray  XR chest 1 view portable   Final Result by Gely Best MD (06/07 1636)      No acute cardiopulmonary disease  Workstation performed: [id number]             EKG and Other Studies Reviewed on Admission:   · EKG: NSR  HR no acute st-t changes ,      ** Please Note: This note has been constructed using a voice recognition system   **

## 2022-06-07 NOTE — ASSESSMENT & PLAN NOTE
· Likely non MI related but difficult to differentiate in the setting of poor historian/ dementia  · Patient denies any chest pain  · HS Trop: 1192->974  · EKG shows no acute ST-T changes and sinus rhythm noted  · Echo: Ef 60%  Basal anterolateral and basal inferolateral hypokinesis  · Cardiology Consulted, appreciate recommendations:  · No ECG evidence of ischemia and no previous Echo to compare findings  Trops are down trending  Difficult to determine if New findings  · Continue with Aspirin and Statin therapy  No recommendations for systemic/therapuetic anticoagulation at this time   Defer beta blocker at this time due to resting bradycardia

## 2022-06-07 NOTE — ASSESSMENT & PLAN NOTE
· Patient presents with generalized weakness and decreased appetite and cough and fever as per the daughter for the past 1 week  Patient's daughter tested positive and they currently live together  · Patient tested positive for COVID-19 (6/7/22)  · Patient is on room air saturating about 95% and denies of shortness of breath  · Mild COVID pathway  · Not a candidate for IV steroid or remdesivir at this time  · Also not a candidate for oral anti covid  with medication as symptoms are present for more than a week  · A/C: DVT prophylaxis  Heparin SubQ  · Continue to Trend Cardiac/Inflammatory Markers  · HS Trop: 1192->974  · BNP: 468  · CRP: 49 8  · D-dimer: 1 28  · Patient remains free of Resp distress with No acute complaints  No tachycardia  · Patient at high risk for therapeutic anticoagulation due to advanced age and generalized weakness with increased risk of falls  · Monitor O2 saturation

## 2022-06-07 NOTE — ED PROVIDER NOTES
History  Chief Complaint   Patient presents with    Flu Symptoms     Weak x1 week, staying in bed and not eating or drinking well  Ems states extremely hot in trailer home  Pt is poor historian  daughter here with similar symptoms     This is a 80year old female with past medical history significant for hypertension, CKD, Alzheimer's disease, uterine cancer, and prior myocardial infarction presenting to the emergency department today for generalized weakness  Ongoing for approximately 1 week  The patient is having a difficult time getting out of bed  She does note twinges in her chest but denies any shortness of breath  The patient also notes that she has had poor appetite over the past week  She does note a fever with T-max of 101° F  She is COVID-19 vaccinated  She denies any nausea, vomiting, diarrhea, constipation, or urinary symptoms  She denies any dizziness, lightheadedness, or visual disturbances  She does have a non-productive cough  No sore throat, nasal congestion, or rhinorrhea  The patient denies other complaints at this time  History provided by:  Patient   used: No    Flu Symptoms  Presenting symptoms: cough, fatigue, fever and myalgias    Presenting symptoms: no diarrhea, no headaches, no nausea, no rhinorrhea, no shortness of breath, no sore throat and no vomiting    Severity:  Moderate  Onset quality:  Gradual  Progression:  Worsening  Chronicity:  New  Relieved by:  None tried  Worsened by:  Nothing  Ineffective treatments:  None tried  Associated symptoms: chills, decreased appetite and decreased physical activity    Associated symptoms: no ear pain, no mental status change, no congestion, no neck stiffness and no syncope    Risk factors: being elderly, heart disease and sick contacts        Prior to Admission Medications   Prescriptions Last Dose Informant Patient Reported? Taking? Breo Ellipta 100-25 MCG/INH inhaler   No No   Sig: INHALE ONE PUFF DAILY  RINSE MOUTH AFTER USE   Dexilant 60 MG capsule   No No   Sig: TAKE ONE CAPSULE BY MOUTH EVERY DAY   ProAir  (90 Base) MCG/ACT inhaler   No No   Sig: INHALE TWO PUFFS BY MOUTH EVERY 4 HOURS AS NEEDED   Patient not taking: Reported on 4/19/2022   cyanocobalamin (VITAMIN B-12) 1,000 mcg tablet  Self Yes No   Sig: Take 1,000 mcg by mouth daily  gabapentin (NEURONTIN) 100 mg capsule   No No   Sig: TAKE 1 CAPSULE BY MOUTH UP TO FOUR TIMES A DAY   hydrocortisone 2 5 % ointment   No No   Sig: Apply topically 2 (two) times a day   Patient not taking: Reported on 4/19/2022    pravastatin (PRAVACHOL) 10 mg tablet   No No   Sig: TAKE ONE TABLET BY MOUTH EVERY DAY   traMADol (ULTRAM) 50 mg tablet   No No   Sig: TAKE ONE TABLET BY MOUTH TWICE A DAY      Facility-Administered Medications: None       Past Medical History:   Diagnosis Date    Arthritis     Asthma     [facility] [address] [facility]     Chondrocalcinosis     Chronic sciatica     [facility]     Diverticulosis     GERD (gastroesophageal reflux disease)     Gout     High blood pressure     History of low potassium     Hyperlipidemia     Insomnia     Iron deficiency anemia     Low vitamin D level     Myocardial infarction ([facility]     Neuropathy     Stage 3a chronic kidney disease ([facility] ) 11/16/2021    Uterine cancer ([facility] )        Past Surgical History:   Procedure Laterality Date    APPENDECTOMY      CATARACT EXTRACTION Right     COLONOSCOPY      2012?  EGD  04/2019    upper- Hiatal hernia    ESOPHAGOGASTRODUODENOSCOPY      HYSTERECTOMY      TONSILLECTOMY         Family History   Problem Relation Age of Onset    Cancer Sister     Stroke Sister     Heart disease Brother      I have reviewed and agree with the history as documented      E-Cigarette/Vaping    E-Cigarette Use Never User      E-Cigarette/Vaping Substances     Social History     Tobacco Use    Smoking status: Never Smoker    Smokeless tobacco: Never Used   Vaping Use    Vaping Use: Never used   Substance Use Topics    Alcohol use: No    Drug use: No       Review of Systems   Constitutional: Positive for appetite change, chills, decreased appetite, fatigue and fever  Negative for diaphoresis  HENT: Negative for congestion, ear pain, rhinorrhea, sinus pressure, sinus pain and sore throat  Respiratory: Positive for cough and chest tightness ("twinges")  Negative for shortness of breath and wheezing  Cardiovascular: Positive for chest pain ("twinges")  Negative for palpitations  Gastrointestinal: Negative for abdominal pain, constipation, diarrhea, nausea and vomiting  Genitourinary: Negative for dysuria  Musculoskeletal: Positive for myalgias  Negative for arthralgias and neck stiffness  Skin: Negative for rash and wound  Neurological: Negative for dizziness, seizures, syncope, weakness, light-headedness, numbness and headaches  Psychiatric/Behavioral: Negative for confusion  All other systems reviewed and are negative  Physical Exam  Physical Exam  Vitals and nursing note reviewed  Constitutional:       General: She is not in acute distress  Appearance: Normal appearance  She is not ill-appearing, toxic-appearing or diaphoretic  Comments: Frail; generally weak   HENT:      Head: Normocephalic and atraumatic  Nose: Nose normal  No congestion or rhinorrhea  Mouth/Throat:      Mouth: Mucous membranes are moist       Pharynx: No oropharyngeal exudate or posterior oropharyngeal erythema  Eyes:      General: No scleral icterus  Right eye: No discharge  Left eye: No discharge  Extraocular Movements: Extraocular movements intact  Pupils: Pupils are equal, round, and reactive to light  Neck:      Comments: Non meningeal  Cardiovascular:      Rate and Rhythm: Normal rate and regular rhythm  Pulses: Normal pulses  Heart sounds: Normal heart sounds  No murmur heard  No friction rub  No gallop     Pulmonary:      Effort: Pulmonary effort is normal  No respiratory distress  Breath sounds: Normal breath sounds  No stridor  No wheezing, rhonchi or rales  Comments: Clear to auscultation bilaterally without adventitious breath sounds  Chest:      Chest wall: No tenderness  Abdominal:      General: Abdomen is flat  There is no distension  Palpations: Abdomen is soft  Tenderness: There is no abdominal tenderness  There is no right CVA tenderness, left CVA tenderness, guarding or rebound  Comments: Soft, nontender, nondistended, and without organomegaly   Musculoskeletal:         General: Normal range of motion  Cervical back: Normal range of motion  No tenderness  Right lower leg: No edema  Left lower leg: No edema  Comments: Negative Loc sign bilaterally   Skin:     General: Skin is warm and dry  Capillary Refill: Capillary refill takes less than 2 seconds  Coloration: Skin is not jaundiced or pale  Neurological:      General: No focal deficit present  Mental Status: She is alert  Mental status is at baseline        Comments: Alert and oriented to person and place but is uncertain that time  The patient has generalized weakness to her bilateral upper and lower extremities; no stroke-like symptoms   Psychiatric:         Mood and Affect: Mood normal          Behavior: Behavior normal          Vital Signs  ED Triage Vitals [06/07/22 1518]   Temperature Pulse Respirations Blood Pressure SpO2   98 3 °F (36 8 °C) 73 16 (!) 156/122 98 %      Temp Source Heart Rate Source Patient Position - Orthostatic VS BP Location FiO2 (%)   Oral Monitor Lying Right arm --      Pain Score       --           Vitals:    06/07/22 1518 06/07/22 1736   BP: (!) 156/122 137/63   Pulse: 73 82   Patient Position - Orthostatic VS: Lying Lying         Visual Acuity      ED Medications  Medications   fluticasone-vilanterol (BREO ELLIPTA) 100-25 mcg/inh inhaler 1 puff (has no administration in time range) cyanocobalamin (VITAMIN B-12) tablet 1,000 mcg (has no administration in time range)   pantoprazole (PROTONIX) EC tablet 40 mg (has no administration in time range)   pravastatin (PRAVACHOL) tablet 10 mg (has no administration in time range)   ondansetron (ZOFRAN) injection 4 mg (has no administration in time range)   acetaminophen (TYLENOL) tablet 650 mg (has no administration in time range)   aluminum-magnesium hydroxide-simethicone (MYLANTA) oral suspension 30 mL (has no administration in time range)   aspirin tablet 325 mg (has no administration in time range)   aspirin (ECOTRIN LOW STRENGTH) EC tablet 81 mg (has no administration in time range)   heparin (porcine) subcutaneous injection 5,000 Units (has no administration in time range)   lactated ringers infusion (has no administration in time range)       Diagnostic Studies  Results Reviewed     Procedure Component Value Units Date/Time    COVID/FLU/RSV - 2 hour TAT [[id number]]  (Abnormal) Collected: 06/07/22 1542    Lab Status: Final result Specimen: Nares from Nose Updated: 06/07/22 1640     SARS-CoV-2 Positive     INFLUENZA A PCR Negative     INFLUENZA B PCR Negative     RSV PCR Negative    Narrative:      FOR PEDIATRIC PATIENTS - copy/paste COVID Guidelines URL to browser: [email or web address]  [email or web address]    SARS-CoV-2 assay is a Nucleic Acid Amplification assay intended for the  qualitative detection of nucleic acid from SARS-CoV-2 in nasopharyngeal  swabs  Results are for the presumptive identification of SARS-CoV-2 RNA  Positive results are indicative of infection with SARS-CoV-2, the virus  causing COVID-19, but do not rule out bacterial infection or co-infection  with other viruses  Laboratories within the [place] and its  territories are required to report all positive results to the appropriate  public health authorities   Negative results do not preclude SARS-CoV-2  infection and should not be used as the sole basis for treatment or other  patient management decisions  Negative results must be combined with  clinical observations, patient history, and epidemiological information  This test has not been FDA cleared or approved  This test has been authorized by FDA under an Emergency Use Authorization  (EUA)  This test is only authorized for the duration of time the  declaration that circumstances exist justifying the authorization of the  emergency use of an in vitro diagnostic tests for detection of SARS-CoV-2  virus and/or diagnosis of COVID-19 infection under section 564(b)(1) of  the Act, 21 U  S C  782HZA-3(N)(9), unless the authorization is terminated  or revoked sooner  The test has been validated but independent review by FDA  and CLIA is pending  Test performed using [employer] Gene[employer]pert: This RT-PCR assay targets N2,  a region unique to SARS-CoV-2  A conserved region in the E-gene was chosen  for pan-Sarbecovirus detection which includes SARS-CoV-2  HS Troponin 0hr (reflex protocol) [[id number]]  (Abnormal) Collected: 06/07/22 1542    Lab Status: Final result Specimen: Blood from Arm, Right Updated: 06/07/22 1614     hs TnI 0hr 1,192 ng/L     Lactic acid, plasma [[id number]]  (Normal) Collected: 06/07/22 1542    Lab Status: Final result Specimen: Blood from Arm, Right Updated: 06/07/22 1613     LACTIC ACID 0 8 mmol/L     Narrative:      Result may be elevated if tourniquet was used during collection      B-Type Natriuretic Peptide(BNP) AN, CA, EA Campuses Only [[id number]]  (Abnormal) Collected: 06/07/22 1542    Lab Status: Final result Specimen: Blood from Arm, Right Updated: 06/07/22 1613      pg/mL     Comprehensive metabolic panel [[id number]] Collected: 06/07/22 1542    Lab Status: Final result Specimen: Blood from Arm, Right Updated: 06/07/22 1610     Sodium 138 mmol/L      Potassium 4 2 mmol/L      Chloride 102 mmol/L      CO2 24 mmol/L      ANION GAP 12 mmol/L      BUN 15 mg/dL Creatinine 0 93 mg/dL      Glucose 96 mg/dL      Calcium 9 3 mg/dL      AST 17 U/L      ALT 12 U/L      Alkaline Phosphatase 65 U/L      Total Protein 6 9 g/dL      Albumin 3 7 g/dL      Total Bilirubin 0 71 mg/dL      eGFR 52 ml/min/1 73sq m     Narrative:      National Kidney Disease Foundation guidelines for Chronic Kidney Disease (CKD):     Stage 1 with normal or high GFR (GFR > 90 mL/min/1 73 square meters)    Stage 2 Mild CKD (GFR = 60-89 mL/min/1 73 square meters)    Stage 3A Moderate CKD (GFR = 45-59 mL/min/1 73 square meters)    Stage 3B Moderate CKD (GFR = 30-44 mL/min/1 73 square meters)    Stage 4 Severe CKD (GFR = 15-29 mL/min/1 73 square meters)    Stage 5 End Stage CKD (GFR <15 mL/min/1 73 square meters)  Note: GFR calculation is accurate only with a steady state creatinine    Lipase [[id number]]  (Abnormal) Collected: 06/07/22 1542    Lab Status: Final result Specimen: Blood from Arm, Right Updated: 06/07/22 1610     Lipase 6 u/L     CK Total with Reflex CKMB [[id number]]  (Normal) Collected: 06/07/22 1542    Lab Status: Final result Specimen: Blood from Arm, Right Updated: 06/07/22 1610     Total CK 92 U/L     CBC and differential [[id number]]  (Abnormal) Collected: 06/07/22 1542    Lab Status: Final result Specimen: Blood from Arm, Right Updated: 06/07/22 1549     WBC 8 13 Thousand/uL      RBC 4 01 Million/uL      Hemoglobin 12 0 g/dL      Hematocrit 37 3 %      MCV 93 fL      MCH 29 9 pg      MCHC 32 2 g/dL      RDW 13 2 %      MPV 10 0 fL      Platelets 375 Thousands/uL      nRBC 0 /100 WBCs      Neutrophils Relative 84 %      Immat GRANS % 0 %      Lymphocytes Relative 9 %      Monocytes Relative 6 %      Eosinophils Relative 1 %      Basophils Relative 0 %      Neutrophils Absolute 6 88 Thousands/µL      Immature Grans Absolute 0 03 Thousand/uL      Lymphocytes Absolute 0 69 Thousands/µL      Monocytes Absolute 0 46 Thousand/µL      Eosinophils Absolute 0 05 Thousand/µL      Basophils Absolute 0 02 Thousands/µL     UA w Reflex to Microscopic w Reflex to Culture [[id number]]     Lab Status: No result Specimen: Urine                  XR chest 1 view portable   Final Result by Otf Padron MD (06/07 1636)      No acute cardiopulmonary disease  Workstation performed: [id number]                    Procedures  ECG 12 Lead Documentation Only    Date/Time: 6/7/2022 3:46 PM  Performed by: Jessie Quiñones PA-C  Authorized by: Jessie Quiñones PA-C     Indications / Diagnosis:  Generalized Weakness; "Chest Twinges"  Patient location:  ED  Previous ECG:     Previous ECG:  Unavailable  Interpretation:     Interpretation: abnormal    Rate:     ECG rate:  70    ECG rate assessment: normal    Rhythm:     Rhythm: sinus rhythm    Ectopy:     Ectopy: PAC    QRS:     QRS axis:  Normal  Conduction:     Conduction: normal    ST segments:     ST segments:  Non-specific  T waves:     T waves: normal    Comments:      Normal sinus rhythm with a rate of 70 with an old septal infarct; does not appear to be acutely ischemic  Premature atrial contractions  Normal axis             ED Course  ED Course as of 06/07/22 1822   Tue Jun 07, 2022   1619 hs TnI 0hr(!): 1,192  Will contact cardiology and send EKG  1622 TT sent to Dr Maxx Garcia, cardiologist on call  [address] [facility] Dr Maxx Garcia notes an old septal infarct  Does not appear to have anything new  Recommends against ASA at this time  Recommends admission here  MDM  Number of Diagnoses or Management Options  COVID-19: new and requires workup  NSTEMI (non-ST elevated myocardial infarction) [facility]): new and requires workup  Diagnosis management comments: This is a 60-year-old female presenting to the emergency department today for generalized weakness  Notes difficulty getting out of bed and has had very poor appetite  Also has had a fever    On physical examination, the patient has a normal cardiac and pulmonary examination  Normal abdominal examination  Vital signs are stable  Patient does not meet SIRS criteria at this time  No leukocytosis  Mildly elevated BNP  The patient's initial troponin is 1192  EKG shows normal sinus rhythm with a rate of 70 with evidence of old septal infarct  Premature atrial contractions noted  I discussed the case with Dr Federica Denney who notes it appears to be old ischemia  Patient can be admitted to [facility] per cardiology  Chest x-ray without any acute cardiopulmonary disease  Patient did test COVID positive  Given increase in troponin, decision was made to admit the patient inpatient to the service of Dr Rosie Salgado  The patient and/or patient's proxy verify their understanding and agree to the plan at this time  All questions answered to the patient and/or their proxy's satisfaction  All labs reviewed and utilized in the medical decision making process  All radiology studies independently viewed by me and interpreted by the radiologist  Portions of the record may have been created with voice recognition software   Occasional wrong word or "sound a like" substitutions may have occurred due to the inherent limitations of voice recognition software   Read the chart carefully and recognize, using context, where substitutions have occurred           Amount and/or Complexity of Data Reviewed  Clinical lab tests: ordered and reviewed  Tests in the radiology section of CPT®: ordered and reviewed  Review and summarize past medical records: yes  Discuss the patient with other providers: yes (Dr Rosie Denney - Cardiology)  Independent visualization of images, tracings, or specimens: yes (EKG  CXR)    Patient Progress  Patient progress: stable      Disposition  Final diagnoses:   NSTEMI (non-ST elevated myocardial infarction) ([facility] )   COVID-19     Time reflects when diagnosis was documented in both MDM as applicable and the Disposition within this note     Time User Action Codes Description Comment    6/7/2022  4:49 PM Spenser Duran Keiry Add [I21 4] NSTEMI (non-ST elevated myocardial infarction) ([facility] [address] )     6/7/2022  4:49 PM Renee [address] [place] [address] [U07 1] COVID-19       ED Disposition     ED Disposition   Admit    Condition   Stable    Date/Time   Tue Jun 7, 2022  4:51 PM    Comment   Case was discussed with Dr Kerwin Odom and the patient's admission status was agreed to be Admission Status: inpatient status to the service of Dr Kerwin Odom  Follow-up Information    None         Current Discharge Medication List      CONTINUE these medications which have NOT CHANGED    Details   Breo Ellipta 100-25 MCG/INH inhaler INHALE ONE PUFF DAILY  RINSE MOUTH AFTER USE  Qty: 90 blister, Refills: 1    Associated Diagnoses: Moderate persistent asthma without complication      cyanocobalamin (VITAMIN B-12) 1,000 mcg tablet Take 1,000 mcg by mouth daily        Dexilant 60 MG capsule TAKE ONE CAPSULE BY MOUTH EVERY DAY  Qty: 90 capsule, Refills: 1    Associated Diagnoses: Gastroesophageal reflux disease without esophagitis      gabapentin (NEURONTIN) 100 mg capsule TAKE 1 CAPSULE BY MOUTH UP TO FOUR TIMES A DAY  Qty: 120 capsule, Refills: 5    Associated Diagnoses: Neuropathy      hydrocortisone 2 5 % ointment Apply topically 2 (two) times a day  Qty: 30 g, Refills: 0    Associated Diagnoses: Contact dermatitis due to detergent, unspecified contact dermatitis type      pravastatin (PRAVACHOL) 10 mg tablet TAKE ONE TABLET BY MOUTH EVERY DAY  Qty: 90 tablet, Refills: 0    Associated Diagnoses: Essential hypertension      ProAir  (90 Base) MCG/ACT inhaler INHALE TWO PUFFS BY MOUTH EVERY 4 HOURS AS NEEDED  Qty: 25 5 g, Refills: 1    Associated Diagnoses: Mild intermittent asthma, unspecified whether complicated      traMADol (ULTRAM) 50 mg tablet TAKE ONE TABLET BY MOUTH TWICE A DAY  Qty: 60 tablet, Refills: 5    Associated Diagnoses: Neuropathy             No discharge procedures on file      PDMP Review     None          ED Provider  Electronically Signed by           Quique Posey PA-C  06/07/22 1676

## 2022-06-07 NOTE — ASSESSMENT & PLAN NOTE
· Continue supportive care   · Regulate sleep/wake cycle  · Continue virtual 1:1 for Safety  · Monitor mental status

## 2022-06-07 NOTE — ASSESSMENT & PLAN NOTE
· Patient presents with generalized weakness and decreased appetite and cough and fever as per the daughter for the past 1 week  Patient tested positive for COVID-19  · Patient has been vaccinated with 2 doses and also the daughter tested positive who lives with the mother  · Patient is on room air saturating about 95%  · Patient denies of shortness of breath  · Not a candidate for IV steroid or remdesivir  Also not a candidate for oral anti covid  with medication as symptoms are present for more than a week  · Monitor O2 saturation

## 2022-06-07 NOTE — ASSESSMENT & PLAN NOTE
· Likely non MI related-patient denies of chest pain  EKG shows no acute ST-T changes and sinus rhythm noted  · Will trend troponin  Cardiology was contacted from the ED and recommended to give aspirin and no acute intervention at this time

## 2022-06-08 NOTE — PLAN OF CARE
Problem: Prexisting or High Potential for Compromised Skin Integrity  Goal: Skin integrity is maintained or improved  Description: INTERVENTIONS:  - Identify patients at risk for skin breakdown  - Assess and monitor skin integrity  - Assess and monitor nutrition and hydration status  - Monitor labs   - Assess for incontinence   - Turn and reposition patient  - Assist with mobility/ambulation  - Relieve pressure over bony prominences  - Avoid friction and shearing  - Provide appropriate hygiene as needed including keeping skin clean and dry  - Evaluate need for skin moisturizer/barrier cream  - Collaborate with interdisciplinary team   - Patient/family teaching  - Consider wound care consult   Outcome: Progressing     Problem: PAIN - ADULT  Goal: Verbalizes/displays adequate comfort level or baseline comfort level  Description: Interventions:  - Encourage patient to monitor pain and request assistance  - Assess pain using appropriate pain scale  - Administer analgesics based on type and severity of pain and evaluate response  - Implement non-pharmacological measures as appropriate and evaluate response  - Consider cultural and social influences on pain and pain management  - Notify physician/advanced practitioner if interventions unsuccessful or patient reports new pain  Outcome: Progressing     Problem: INFECTION - ADULT  Goal: Absence or prevention of progression during hospitalization  Description: INTERVENTIONS:  - Assess and monitor for signs and symptoms of infection  - Monitor lab/diagnostic results  - Monitor all insertion sites, i e  indwelling lines, tubes, and drains  - Monitor endotracheal if appropriate and nasal secretions for changes in amount and color  - Warner appropriate cooling/warming therapies per order  - Administer medications as ordered  - Instruct and encourage patient and family to use good hand hygiene technique  - Identify and instruct in appropriate isolation precautions for identified infection/condition  Outcome: Progressing  Goal: Absence of fever/infection during neutropenic period  Description: INTERVENTIONS:  - Monitor WBC    Outcome: Progressing     Problem: SAFETY ADULT  Goal: Patient will remain free of falls  Description: INTERVENTIONS:  - Educate patient/family on patient safety including physical limitations  - Instruct patient to call for assistance with activity   - Consult OT/PT to assist with strengthening/mobility   - Keep Call bell within reach  - Keep bed low and locked with side rails adjusted as appropriate  - Keep care items and personal belongings within reach  - Initiate and maintain comfort rounds  - Make Fall Risk Sign visible to staff  - Offer Toileting every 2 Hours, in advance of need  - Initiate/Maintain bed alarm  - Obtain necessary fall risk management equipment:   - Apply yellow socks and bracelet for high fall risk patients  - Consider moving patient to room near nurses station  Outcome: Progressing  Goal: Maintain or return to baseline ADL function  Description: INTERVENTIONS:  -  Assess patient's ability to carry out ADLs; assess patient's baseline for ADL function and identify physical deficits which impact ability to perform ADLs (bathing, care of mouth/teeth, toileting, grooming, dressing, etc )  - Assess/evaluate cause of self-care deficits   - Assess range of motion  - Assess patient's mobility; develop plan if impaired  - Assess patient's need for assistive devices and provide as appropriate  - Encourage maximum independence but intervene and supervise when necessary  - Involve family in performance of ADLs  - Assess for home care needs following discharge   - Consider OT consult to assist with ADL evaluation and planning for discharge  - Provide patient education as appropriate  Outcome: Progressing  Goal: Maintains/Returns to pre admission functional level  Description: INTERVENTIONS:  - Perform BMAT or MOVE assessment daily    - Set and communicate daily mobility goal to care team and patient/family/caregiver  - Collaborate with rehabilitation services on mobility goals if consulted  - Perform Range of Motion 2 times a day  - Reposition patient every 2 hours  - Dangle patient 2 times a day  - Stand patient 2 times a day  - Ambulate patient 2 times a day  - Out of bed to chair 2 times a day   - Out of bed for meals 2 times a day  - Out of bed for toileting  - Record patient progress and toleration of activity level   Outcome: Progressing     Problem: DISCHARGE PLANNING  Goal: Discharge to home or other facility with appropriate resources  Description: INTERVENTIONS:  - Identify barriers to discharge w/patient and caregiver  - Arrange for needed discharge resources and transportation as appropriate  - Identify discharge learning needs (meds, wound care, etc )  - Arrange for interpretive services to assist at discharge as needed  - Refer to Case Management Department for coordinating discharge planning if the patient needs post-hospital services based on physician/advanced practitioner order or complex needs related to functional status, cognitive ability, or social support system  Outcome: Progressing     Problem: Knowledge Deficit  Goal: Patient/family/caregiver demonstrates understanding of disease process, treatment plan, medications, and discharge instructions  Description: Complete learning assessment and assess knowledge base    Interventions:  - Provide teaching at level of understanding  - Provide teaching via preferred learning methods  Outcome: Progressing

## 2022-06-08 NOTE — CONSULTS
[address] Cardiology  CONSULT    Patient Name: Ike Rodgers  Patient MRN: [id number]  Admission Date: 6/7/2022  3:02 PM  Attending Provider: Julieth Bhat MD  Service: Cardiology    Reason for consult: "NSTEMI, COVID-19"    HPI    This is a 80 y o  female with a past medical history of dementia, uterine cancer hypertension, hyperlipidemia, chronic kidney disease, coronary artery disease who presented with a 1 week complaint of generalized weakness and decreased appetite  Notably she also had symptoms of cough and fever  In the emergency department she tested positive for COVID -19  Her lab work was significant for elevated high sensitivity troponin and Cardiology was consulted for this  Documentation from in the ED notes she was complaining of chest twinges intermittently but when asked about these, she does not recall  She also does not recall details of her coronary disease history or if she had a prior myocardial infarction, cardiac cath, or stress testing  She has no acute complaints currently and denies fever, chills, chest pain, palpitations, shortness of breath, orthopnea, lower extremity swelling  She does complain of generalized fatigue  She does apparently ambulate without a walker or cane  Review of Systems  10 point review of systems negative except as noted in HPI    Past Medical History:   Diagnosis Date    Arthritis     Asthma     Cancer [facility] )     Chondrocalcinosis     Chronic sciatica     Dementia ([facility] )     Diverticulosis     GERD (gastroesophageal reflux disease)     Gout     High blood pressure     History of low potassium     Hyperlipidemia     Insomnia     Iron deficiency anemia     Low vitamin D level     Myocardial infarction ([facility] )     Neuropathy     Stage 3a chronic kidney disease ([facility] ) 11/16/2021    Uterine cancer ([facility] )        Past Surgical History:   Procedure Laterality Date    APPENDECTOMY      CATARACT EXTRACTION Right     COLONOSCOPY      2012?     EGD  04/2019    upper- Hiatal hernia    ESOPHAGOGASTRODUODENOSCOPY      HYSTERECTOMY      TONSILLECTOMY         Family History   Problem Relation Age of Onset    Cancer Sister     Stroke Sister     Heart disease Brother        Social History     Tobacco Use    Smoking status: Never Smoker    Smokeless tobacco: Never Used   Substance Use Topics    Alcohol use: No       Vitals:    06/08/22 0758   BP: (!) 110/49   Pulse: 57   Resp:    Temp:    SpO2:         I/O last 3 completed shifts: In: 240 [P O :240]  Out: -   No intake/output data recorded  Oxygen:  O2 Device: None (Room air)    Physical Exam  General Appearance: Alert, cooperative, no distress, appears stated age  Head: Normocephalic, without obvious abnormality, atraumatic  Eyes: PERRL, conjunctiva/corneas clear, EOM's intact  Neck: No carotid bruit or JVD  Lungs: Clear to auscultation bilaterally, respirations unlabored, no wheezes, rales  Heart: RRR, S1 and S2 normal, no murmur, rub or gallop  Abdomen: Soft, non-tender, bowel sounds active  Extremities: Extremities normal, atraumatic, no edema  Pulses: 2+ and symmetric all extremities  Skin: Warm and dry without and rashes or lesions  Neurologic: CNII-XII grossly intact      Current Medications:  Scheduled Meds:  Current Facility-Administered Medications   Medication Dose Route Frequency Provider Last Rate    acetaminophen  650 mg Oral Q6H PRN Alphonsus Meigs, MD      aluminum-magnesium hydroxide-simethicone  30 mL Oral Q6H PRN Alphonsus Meigs, MD      aspirin  81 mg Oral Daily Alphonsus Meigs, MD      vitamin B-12  1,000 mcg Oral Daily Alphonsus Meigs, MD      fluticasone-vilanterol  1 puff Inhalation Daily Alphonsus Meigs, MD      gabapentin  100 mg Oral 4x Daily Estella Chaparro PA-C      heparin (porcine)  5,000 Units Subcutaneous Q8H [address], MD      lactated ringers  75 mL/hr Intravenous Continuous Alphonsus Meigs, MD 75 mL/hr (06/07/22 5267)  melatonin  3 mg Oral HS Estella Mena PA-C      ondansetron  4 mg Intravenous Q6H PRN Carly Hansen MD      pantoprazole  40 mg Oral Early Morning Carly Hansne MD      pravastatin  10 mg Oral Daily Carly Hansen MD       Continuous Infusions:lactated ringers, 75 mL/hr, Last Rate: 75 mL/hr (06/07/22 1848)      PRN Meds:   acetaminophen    aluminum-magnesium hydroxide-simethicone    ondansetron    Laboratory Studies:  Lab Results   Component Value Date    WBC 5 97 06/08/2022    HGB 11 4 (L) 06/08/2022    HCT 35 3 06/08/2022    MCV 93 06/08/2022     06/08/2022       Lab Results   Component Value Date    GLUCOSE 220 (H) 09/23/2015    CALCIUM 8 8 06/08/2022    SODIUM 138 06/08/2022    K 3 9 06/08/2022    CO2 27 06/08/2022     06/08/2022    BUN 16 06/08/2022    CREATININE 0 93 06/08/2022       Lab Results   Component Value Date    HGBA1C 5 7 (H) 03/06/2020     Lab Results   Component Value Date    HDL 39 (L) 06/05/2018    LDLCALC 107 (H) 06/05/2018    TRIG 144 06/05/2018       Troponin  Recent Labs     06/07/22  1542   CKTOTAL 92     CARDIAC IMAGING:  Echocardiogram - 6/8/22:    A limited 2D, color flow Doppler, spectral Doppler, 2D, color flow Doppler and spectral Doppler transthoracic echocardiogram was performed    Left Ventricle: Left ventricular cavity size is normal  The left ventricular ejection fraction is 60%  Systolic function is normal  Basal anterolateral and basal inferolateral hypokinesis  The basal inferior wall segment was not well-visualized  Wall motion otherwise appears normal within the limitations of this study    Right Ventricle: Right ventricular cavity size is normal  Systolic function is normal     Cardiac Cath: No prior     Stress Test: No prior    ECG: Personally reviewed    Telemetry: I have reviewed the telemetry which shows normal sinus rhythm  IMAGING  XR chest 1 view portable   Final Result      No acute cardiopulmonary disease  Workstation performed: [id number]              Assessment / Plan  This is a 80 y o  female who presents with    #   Generalized weakness, poor p o  intake, cough, fever   #  COVID-19 infection  #  Elevated troponin  #  History of coronary artery disease  #  Hypertension  #  Hyperlipidemia  #  Dementia    80year old female with the above mentioned history who presents with generalized weakness  She has COVID-19 infection with no acute cardiopulmonary complaints during my encounter  She was febrile this morning to 102 2F  She has a limited echocardiogram performed which was personally reviewed and interpreted reveals LVEF 60%  There is hypokinesis of the basal anterolateral and basal inferolateral wall  There is no prior echocardiogram for comparison  Her ECG is unchanged from prior  Etiology of troponin elevation is unclear  History for chest pain or anginal equivalent is limited due to dementia  No ECG evidence of ischemia but there is a wall motion abnormality in the Cx distribution which can be electrically silent on ECG  There is no prior echo for comparison to determine whether or not this is new  Her troponin level is downward trending  She does apparently have a history of myocardial infarction based off chart review  I attempted to call her daughter to obtain further history but received voicemail  For now, okay to continue aspirin 81 mg and pravastatin  No recommendation for systemic/therapeutic anticoagulation at this time  Standard prophylactic anticoagulation for COVID-19 can be administered  She has some resting bradycardia this morning so we will defer beta blocker for now        Principal Problem:    COVID-19  Active Problems:    Essential hypertension    Asthma    Alzheimer's disease, early onset ([facility] [address] )    Elevated troponin      Code Status: Level 3 - DNAR and DNI    Aneta Arguelles MD

## 2022-06-08 NOTE — UTILIZATION REVIEW
Initial Clinical Review    Admission: Date/Time/Statement:   Admission Orders (From admission, onward)     Ordered        06/07/22 1652  Inpatient Admission  Once                   Orders Placed This Encounter   Procedures    Inpatient Admission     Standing Status:   Standing     Number of Occurrences:   1     Order Specific Question:   Level of Care     Answer:   Med Surg [16]     Order Specific Question:   Estimated length of stay     Answer:   More than 2 Midnights     Order Specific Question:   Certification     Answer:   I certify that inpatient services are medically necessary for this patient for a duration of greater than two midnights  See H&P and MD Progress Notes for additional information about the patient's course of treatment  ED Arrival Information     Expected   -    Arrival   6/7/2022 15:02    Acuity   Urgent       Means of arrival   Ambulance    Escorted by   [facility]    Service   Hospitalist    Admission type   Urgent       FEVER-        Chief Complaint   Patient presents with    Flu Symptoms     Weakness x 1 week, staying in bed and not eating or drinking well  EMS states extremely hot in trailer home  Pt is poor historian  daughter here with similar symptoms     Initial Presentation:   80year old female with PMHx HTN, prior MI, Asthma  CKD, Alzheimer's disease, uterine cancer - presents via EMS to [facility] ED on 6/7/22 2nd 1 week history of generalized weakness - reports having a difficult time getting out of bed, twinges in her chest, non-productive cough, fever to 101 F  and a poor appetite  In ED -  Temp 98 3, /122    Exam: alert and oriented to person and place but uncertain of time  generalized weakness of bilateral upper and lower extremities  Chest x ray no acute disease  EKG - SR with PAC's; no acute changes  Labs:  COVID positive  CBC + BMP wnl  Troponin 1,192    D-Dimer 1 28   CRP 49 8     Admitted Inpatient 6/7/22 at 1652 2nd Acute NSTEMI, per Cardiology Consult COVID-19 Infx - monitor O2 sat    6/8/22 CARDIOLOGY:  Assessment / Plan  81 y/o  female presents with -   #   Generalized weakness, poor p o  intake, cough, fever   #  COVID-19 infection  #  Elevated troponin  #  History of coronary artery disease  #  Hypertension  #  Hyperlipidemia  #  Dementia     80year old female with generalized weakness  She has COVID-19 infection with no acute cardiopulmonary complaints during my encounter  She was febrile this morning to 102 2F  limited echocardiogram  reveals LVEF 60%  There is hypokinesis of the basal anterolateral and basal inferolateral wall  There is no prior echocardiogram for comparison  Her ECG is unchanged from prior  Etiology of troponin elevation is unclear  History of  chest pain or anginal equivalent is limited due to dementia  No ECG evidence of ischemia but there is a wall motion abnormality in the Cx distribution which can be electrically silent on ECG  There is no prior echo for comparison to determine whether or not this is new  Her troponin level is downward trending  She does apparently have a history of myocardial infarction based off chart review    Plan: For now - continue aspirin 81 mg and pravastatin  No recommendation for systemic/therapeutic anticoagulation at this time  Standard prophylactic anticoagulation for COVID-19 can be administered   She has some resting bradycardia this morning so we will defer beta blocker for now      ED Triage Vitals   Temperature Pulse Respirations Blood Pressure SpO2   06/07/22 1518 06/07/22 1518 06/07/22 1518 06/07/22 1518 06/07/22 1518   98 3 °F (36 8 °C) 73 16 (!) 156/122 98 %      Temp Source Heart Rate Source Patient Position - Orthostatic VS BP Location FiO2 (%)   06/07/22 1518 06/07/22 1518 06/07/22 1518 06/07/22 1518 --   Oral Monitor Lying Right arm       Wt Readings from Last 1 Encounters:   06/08/22 60 3 kg (133 lb)     Additional Vital Signs:   Date/Time Temp Pulse Resp BP MAP (mmHg) SpO2 O2 Device Patient Position - Orthostatic VS   06/08/22 1547 98 5 °F (36 9 °C) 68 18 112/49 Abnormal  72 98 % None (Room air) Lying   06/08/22 1118 97 8 °F (36 6 °C) 65 15 128/59 92 99 % None (Room air) Lying   06/08/22 1045 98 7 °F (37 1 °C) -- -- -- -- 98 % None (Room air) --   06/08/22 0940 -- -- -- -- -- 100 % None (Room air) --   06/08/22 0758 -- 57 -- 110/49 Abnormal  -- -- -- --   06/08/22 0733 97 3 °F (36 3 °C) Abnormal  57 16 110/49 Abnormal  -- 100 % -- Lying   06/08/22 0500 97 8 °F (36 6 °C) 59 20 117/49 Abnormal  -- -- -- Lying   06/08/22 0232 101 2 °F (38 4 °C) Abnormal  -- -- -- -- -- -- --   06/08/22 0047 102 2 °F (39 °C) Abnormal  87 18 144/69 -- 98 % None (Room air) Lying   06/07/22 1736 100 °F (37 8 °C) 82 16 137/63 -- 98 % -- Lying   06/07/22 1518 98 3 °F (36 8 °C) 73 16 156/122 Abnormal  -- 98 % None (Room air) Lying      06/07 0701 06/08 0700 06/08 0701   -   P O  240    I V  (mL/kg)  980 (16 3)   Total Intake(mL/kg) 240 (4) 980 (16 3)   Net +240 +980        Unmeasured Urine Occurrence 2 x      Pertinent Labs/Diagnostic Test Results:   6/7/22 - EKG:  Sinus rhythm  Atrial premature complex  Anteroseptal infarct, old  When compared with ECG of 30-Oct-2019  No significant change was found       XR chest 1 view portable   No acute cardiopulmonary disease       Results from last 7 days   Lab Units 06/07/22  1542   SARS-COV-2  Positive*     Results from last 7 days   Lab Units 06/08/22  0609 06/07/22  1542   WBC Thousand/uL 5 97 8 13   HEMOGLOBIN g/dL 11 4* 12 0   HEMATOCRIT % 35 3 37 3   PLATELETS Thousands/uL 239 266   NEUTROS ABS Thousands/µL 5 08 6 88       Results from last 7 days   Lab Units 06/08/22  0609 06/07/22  1542   SODIUM mmol/L 138 138   POTASSIUM mmol/L 3 9 4 2   CHLORIDE mmol/L 104 102   CO2 mmol/L 27 24   ANION GAP mmol/L 7 12   BUN mg/dL 16 15   CREATININE mg/dL 0 93 0 93   EGFR ml/min/1 73sq m 52 52   CALCIUM mg/dL 8 8 9 3     Results from last 7 days   Lab Units 06/08/22  0609 06/07/22  1542   AST U/L 19 17   ALT U/L 11 12   ALK PHOS U/L 55 65   TOTAL PROTEIN g/dL 6 3* 6 9   ALBUMIN g/dL 3 2* 3 7   TOTAL BILIRUBIN mg/dL 0 51 0 71     Results from last 7 days   Lab Units 06/08/22  0609 06/07/22  1542   GLUCOSE RANDOM mg/dL 111 96     Results from last 7 days   Lab Units 06/07/22  1542   CK TOTAL U/L 92     Results from last 7 days   Lab Units 06/07/22  2030 06/07/22  1542   HS TNI 0HR ng/L  --  1,192*   HS TNI 4HR ng/L 974*  --    HSTNI D4 ng/L -218  --      Results from last 7 days   Lab Units 06/08/22  0609   D-DIMER QUANTITATIVE ug/ml FEU 1 28*       Results from last 7 days   Lab Units 06/08/22  0609   PROCALCITONIN ng/ml 0 08     Results from last 7 days   Lab Units 06/07/22  1542   LACTIC ACID mmol/L 0 8     Results from last 7 days   Lab Units 06/07/22  1542   BNP pg/mL 468*     Results from last 7 days   Lab Units 06/07/22  1542   LIPASE u/L 6*     Results from last 7 days   Lab Units 06/08/22  0609   CRP mg/L 49 8*     Results from last 7 days   Lab Units 06/07/22  1542   INFLUENZA A PCR  Negative   INFLUENZA B PCR  Negative   RSV PCR  Negative     Past Medical History:   Diagnosis Date    Arthritis     Asthma     Cancer ([place] [address] )     Chondrocalcinosis     Chronic sciatica     Dementia ([place] [address] )     Diverticulosis     GERD (gastroesophageal reflux disease)     Gout     High blood pressure     History of low potassium     Hyperlipidemia     Insomnia     Iron deficiency anemia     Low vitamin D level     Myocardial infarction (HCC)     Neuropathy     Stage 3a chronic kidney disease ([place] [address] ) 11/16/2021    Uterine cancer ([place] [address] )      Present on Admission:   Asthma   [facility]'s disease, early onset ([facility] )   Essential hypertension    Admitting Diagnosis: NSTEMI (non-ST elevated myocardial infarction) ([place] [address] ) [I21 4]  Flu-like symptoms [R68 89]  COVID-19 [U07 1]    Age/Sex: 80 y o  female    Admission Orders:  Telemetry  ST Segment Monitoring  VS q4hrs  Continuous Pulse Oximetry  SCD  Up + OOB as tolerated  Diet Regular: Finger Foods   I+O q shift  Daily weight  Serial HS Troponin q2hrs x 3  PT + OT Evals    Scheduled Medications:  aspirin, 81 mg, Oral, Daily  vitamin B-12, 1,000 mcg, Oral, Daily  fluticasone-vilanterol, 1 puff, Inhalation, Daily  gabapentin, 100 mg, Oral, 4x Daily  heparin (porcine), 5,000 Units, Subcutaneous, Q8H HARPAL  melatonin, 3 mg, Oral, HS  pantoprazole, 40 mg, Oral, Early Morning  pravastatin, 10 mg, Oral, Daily    Continuous IV Infusions:  IVF lactated ringers, 75 mL/hr, Intravenous, Continuous    PRN Meds:  acetaminophen, 650 mg, Oral, Q6H PRN - 6/8 X 1  aluminum-magnesium hydroxide-simethicone, 30 mL, Oral, Q6H PRN  ondansetron, 4 mg, Intravenous, Q6H PRN    IP CONSULT TO CARDIOLOGY    Network Utilization Review Department  ATTENTION: Please call with any questions or concerns to [phone] and carefully listen to the prompts so that you are directed to the right person  All voicemails are confidential   Esteban Fonseca all requests for admission clinical reviews, approved or denied determinations and any other requests to dedicated fax number below belonging to the campus where the patient is receiving treatment  List of dedicated fax numbers for the Facilities:  [address] DENIALS (Administrative/Medical Necessity) [phone]   [address] (Maternity/NICU/Pediatrics) [phone]   [address]  [phone]   Sara Min [address] [address] [place] [address] Kaylin Lopez 1481 [phone]   [address] [place] [address]   [address] 951 [phone]

## 2022-06-08 NOTE — PLAN OF CARE
Problem: INFECTION - ADULT  Goal: Absence or prevention of progression during hospitalization  Description: INTERVENTIONS:  - Assess and monitor for signs and symptoms of infection  - Monitor lab/diagnostic results  - Monitor all insertion sites, i e  indwelling lines, tubes, and drains  - Monitor endotracheal if appropriate and nasal secretions for changes in amount and color  - [place] appropriate cooling/warming therapies per order  - Administer medications as ordered  - Instruct and encourage patient and family to use good hand hygiene technique  - Identify and instruct in appropriate isolation precautions for identified infection/condition  Outcome: Progressing     Problem: SAFETY ADULT  Goal: Patient will remain free of falls  Description: INTERVENTIONS:  - Educate patient/family on patient safety including physical limitations  - Instruct patient to call for assistance with activity   - Consult OT/PT to assist with strengthening/mobility   - Keep Call bell within reach  - Keep bed low and locked with side rails adjusted as appropriate  - Keep care items and personal belongings within reach  - Initiate and maintain comfort rounds  - Make Fall Risk Sign visible to staff  - Offer Toileting every 2 Hours, in advance of need  - Initiate/Maintain 2 alarm  - Obtain necessary fall risk management equipment: krys  - Apply yellow socks and bracelet for high fall risk patients  - Consider moving patient to room near nurses station  Outcome: Progressing

## 2022-06-08 NOTE — PROGRESS NOTES
[facility]  Progress Note - Zak Simons 7/4/1927, 80 y o  female MRN: [id number]  Unit/Bed#: -01 Encounter: [id number]  Primary Care Provider: Bandar Pedroza MD   Date and time admitted to hospital: 6/7/2022  3:02 PM    * COVID-19  Assessment & Plan  · Patient presents with generalized weakness and decreased appetite and cough and fever as per the daughter for the past 1 week  Patient's daughter tested positive and they currently live together  · Patient tested positive for COVID-19 (6/7/22)  · Patient is on room air saturating about 95% and denies of shortness of breath  · Mild COVID pathway  · Not a candidate for IV steroid or remdesivir at this time  · Also not a candidate for oral anti covid  with medication as symptoms are present for more than a week  · A/C: DVT prophylaxis  Heparin SubQ  · Continue to Trend Cardiac/Inflammatory Markers  · HS Trop: 1192->974  · BNP: 468  · CRP: 49 8  · D-dimer: 1 28  · Patient remains free of Resp distress with No acute complaints  No tachycardia  · Patient at high risk for therapeutic anticoagulation due to advanced age and generalized weakness with increased risk of falls  · Monitor O2 saturation  Elevated troponin  Assessment & Plan  · Likely non MI related but difficult to differentiate in the setting of poor historian/ dementia  · Patient denies any chest pain  · HS Trop: 1192->974  · EKG shows no acute ST-T changes and sinus rhythm noted  · Echo: Ef 60%  Basal anterolateral and basal inferolateral hypokinesis  · Cardiology Consulted, appreciate recommendations:  · No ECG evidence of ischemia and no previous Echo to compare findings  Trops are down trending  Difficult to determine if New findings  · Continue with Aspirin and Statin therapy  No recommendations for systemic/therapuetic anticoagulation at this time   Defer beta blocker at this time due to resting bradycardia    Alzheimer's disease, early onset ([facility] )  Assessment & Plan  · Continue supportive care   · Regulate sleep/wake cycle  · Continue virtual 1:1 for Safety  · Monitor mental status    Asthma  Assessment & Plan  · Does not appear to be in acute exacerbation  · Continue Home inhalers    Essential hypertension  Assessment & Plan  · Continue statin therapy      VTE Pharmacologic Prophylaxis: VTE Score: 7 High Risk (Score >/= 5) - Pharmacological DVT Prophylaxis Ordered: heparin  Sequential Compression Devices Ordered  Patient Centered Rounds: I performed bedside rounds with nursing staff today  Discussions with Specialists or Other Care Team Provider: Cardiology    Education and Discussions with Family / Patient: Attempted to update  (daughter) via phone  Left voicemail  Time Spent for Care: 30 minutes  More than 50% of total time spent on counseling and coordination of care as described above  Current Length of Stay: 1 day(s)  Current Patient Status: Inpatient   Certification Statement: The patient will continue to require additional inpatient hospital stay due to generalized weakness requring PT/OT consultaiton and poor oral intake requiring IV hydration  Discharge Plan: Anticipate discharge in 24-48 hrs to discharge location to be determined pending rehab evaluations  Code Status: Level 3 - DNAR and DNI    Subjective:   Patient stated she is chilly today  Patient states she is feeling very tired today  Patient denies any chest pain, shortness of breath, abdominal pain, nausea, vomiting, or diarrhea  Objective:     Vitals:   Temp (24hrs), Av 2 °F (37 3 °C), Min:97 3 °F (36 3 °C), Max:102 2 °F (39 °C)    Temp:  [97 3 °F (36 3 °C)-102 2 °F (39 °C)] 98 5 °F (36 9 °C)  HR:  [57-87] 68  Resp:  [15-20] 18  BP: (110-144)/(49-69) 112/49  SpO2:  [98 %-100 %] 98 %  Body mass index is 25 97 kg/m²  Input and Output Summary (last 24 hours):      Intake/Output Summary (Last 24 hours) at 2022 1630  Last data filed at 2022 0940  Gross per 24 hour   Intake 1220 ml   Output --   Net 1220 ml       Physical Exam:   Physical Exam  Vitals and nursing note reviewed  Constitutional:       General: She is not in acute distress  Appearance: She is ill-appearing (Chronically)  HENT:      Head: Normocephalic  Nose: Nose normal  No congestion  Mouth/Throat:      Mouth: Mucous membranes are moist       Pharynx: Oropharynx is clear  Cardiovascular:      Rate and Rhythm: Normal rate and regular rhythm  Pulses: Normal pulses  Heart sounds: Normal heart sounds  No murmur heard  Pulmonary:      Effort: Pulmonary effort is normal  No respiratory distress  Abdominal:      General: Bowel sounds are normal  There is no distension  Palpations: Abdomen is soft  Tenderness: There is abdominal tenderness  Musculoskeletal:         General: Normal range of motion  Cervical back: Normal range of motion  Right lower leg: No edema  Left lower leg: No edema  Skin:     General: Skin is warm and dry  Capillary Refill: Capillary refill takes less than 2 seconds  Neurological:      Mental Status: She is alert  Mental status is at baseline  Motor: No weakness  Comments: Alert and oriented to person, place and time  Disoriented to situation   Psychiatric:         Mood and Affect: Mood normal          Speech: Speech normal          Behavior: Behavior is cooperative           Judgment: Judgment normal           Additional Data:     Labs:  Results from last 7 days   Lab Units 06/08/22  0609   WBC Thousand/uL 5 97   HEMOGLOBIN g/dL 11 4*   HEMATOCRIT % 35 3   PLATELETS Thousands/uL 239   NEUTROS PCT % 85*   LYMPHS PCT % 8*   MONOS PCT % 7   EOS PCT % 0     Results from last 7 days   Lab Units 06/08/22  0609   SODIUM mmol/L 138   POTASSIUM mmol/L 3 9   CHLORIDE mmol/L 104   CO2 mmol/L 27   BUN mg/dL 16   CREATININE mg/dL 0 93   ANION GAP mmol/L 7   CALCIUM mg/dL 8 8   ALBUMIN g/dL 3 2*   TOTAL BILIRUBIN mg/dL 0 51   ALK PHOS U/L 55   ALT U/L 11   AST U/L 19   GLUCOSE RANDOM mg/dL 111                 Results from last 7 days   Lab Units 06/08/22  0609 06/07/22  1542   LACTIC ACID mmol/L  --  0 8   PROCALCITONIN ng/ml 0 08  --        Lines/Drains:  Invasive Devices  Report    Peripheral Intravenous Line  Duration           Peripheral IV 06/07/22 Right Antecubital <1 day                  Telemetry:  Telemetry Orders (From admission, onward)             48 Hour Telemetry Monitoring  Continuous x 48 hours        References:    Telemetry Guidelines   Question:  Reason for 48 Hour Telemetry  Answer:  Arrhythmias Requiring Medical Therapy (eg  SVT, Vtach/fib, Bradycardia, Uncontrolled A-fib)                 Telemetry Reviewed: Normal Sinus Rhythm  Indication for Continued Telemetry Use: No indication for continued use  Will discontinue                Imaging: Reviewed radiology reports from this admission including: chest xray    Recent Cultures (last 7 days):         Last 24 Hours Medication List:   Current Facility-Administered Medications   Medication Dose Route Frequency Provider Last Rate    acetaminophen  650 mg Oral Q6H PRN Damaris Nash MD      aluminum-magnesium hydroxide-simethicone  30 mL Oral Q6H PRN Damaris Nash MD      aspirin  81 mg Oral Daily Damaris Nash MD      vitamin B-12  1,000 mcg Oral Daily Damaris Nash MD      fluticasone-vilanterol  1 puff Inhalation Daily Damaris Nash MD      gabapentin  100 mg Oral 4x Daily Lauren Margarita Bamberger, PA-C      heparin (porcine)  5,000 Units Subcutaneous [facility] Damaris Nash MD      lactated ringers  75 mL/hr Intravenous Continuous Damaris Nash MD 75 mL/hr (06/08/22 0940)    melatonin  3 mg Oral HS Lauren Margarita Bamberger, PA-C      ondansetron  4 mg Intravenous Q6H PRN Damaris Nash MD      pantoprazole  40 mg Oral Early Morning Damaris Nash MD      pravastatin  10 mg Oral Daily Krystle Goldman MD          Today, Patient Was Seen By: MARIO Simons    **Please Note: This note may have been constructed using a voice recognition system  **

## 2022-06-09 NOTE — PLAN OF CARE
Problem: PHYSICAL THERAPY ADULT  Goal: Performs mobility at highest level of function for planned discharge setting  See evaluation for individualized goals  Description: Treatment/Interventions: Functional transfer training, LE strengthening/ROM, Elevations, Therapeutic exercise, Endurance training, Cognitive reorientation, Patient/family training, Equipment eval/education, Gait training, Spoke to MD, Spoke to nursing, Spoke to case management     See flowsheet documentation for full assessment, interventions and recommendations  Outcome: Progressing  Note: Prognosis: Good  Problem List: Decreased strength, Decreased endurance, Impaired balance, Decreased mobility, Decreased cognition, Impaired judgement, Decreased safety awareness, Pain  Assessment: Pt seen for PT evaluation, cleared by RN Guy Egan, activity orders of "activity as tolerated, ambulate patient"  PT consult received for mobility assessment & discharge needs  Pt admitted 6/7/2022 w/ flu like symptoms, dec PO intake, NSTEMI, COVID-19  Comorbidities affecting pt's fnxl performance include: Alzheimer's dementia, asthma, HTN, HLD, RA< DM, depression, CKD, neuropathy, gout  PTA, pt was independent with functional mobility with no AD, independent with ADLS, requiring assist for IADLS and living with her daughter in a 1 level home with unknown # of steps to enter  During PT IE, pt requires S for bed mobility in hospital bed, JULY for all transfers and short in room distance functional mobility w/ no AD x multiple trials  The AM-PAC objective tool in combination w/ Barthel Index outcome tool were used to assist in determining pt safety w/ mobility/ADLs & appropriate d/c recommendations, see above for scores  Pt is at risk of falls d/t multiple comorbidities, impulsivity, impaired balance, impaired cognition, impaired insight/safety awareness, varying levels of pain , advanced age, acuity of medical illness and ongoing medical treatment of primary dx   Pt's clinical presentation is currently unstable/unpredictable as seen in pt's presentation of changing level of pain, varying levels of cognitive performance, increased fall risk, new onset of impairment of functional mobility, decreased endurance and new onset of weakness, and requires high complexity clinical decision making  Pt will benefit from continued PT treatment in order to address impairments, decrease risk of falls, maximize independence w/ fnxl mobility, & ensure safety w/ mobility for transition to next level of care  Based on pt presentation & impairments, pt would most appropriately benefit from post acute STR, vs home with HHPT + 24hr care - pending progress and level of assist able to be provided  Barriers to Discharge: Decreased caregiver support    PT Discharge Recommendation: Post acute rehabilitation services (vs home with HHPT + 24hr care pending progress & level of assistance able to be provided at home)    See flowsheet documentation for full assessment

## 2022-06-09 NOTE — ASSESSMENT & PLAN NOTE
· Evaluated by Cardiology looks non MI related  · Troponins already downtrending  No EKG changes  · Echo revealed: Preserved EF but does have basal anterolateral and basal inferior lateral hypokinesia    · Continue aspirin, statin  · Low normal HR so will defer beta-blocker at this time as per cardiology recommendation

## 2022-06-09 NOTE — PHYSICAL THERAPY NOTE
PHYSICAL THERAPY EVALUATION & TREATMENT  TIME: 8100-1893    NAME:  Rhonda Ibanez  DATE: 06/09/22    AGE:   80 y o  Mrn:   [id number]  Length Of Stay: 2    ADMIT DX:  NSTEMI (non-ST elevated myocardial infarction) ([facility] ) [I21 4]  Flu-like symptoms [R68 89]  COVID-19 [U07 1]    Past Medical History:   Diagnosis Date    Arthritis     Asthma     Cancer ([place] [address] )     Chondrocalcinosis     Chronic sciatica     Dementia ([place] [address] )     Diverticulosis     GERD (gastroesophageal reflux disease)     Gout     High blood pressure     History of low potassium     Hyperlipidemia     Insomnia     Iron deficiency anemia     Low vitamin D level     Myocardial infarction ([place] [address] )     Neuropathy     Stage 3a chronic kidney disease ([place] [address] ) 11/16/2021    Uterine cancer [facility])      Past Surgical History:   Procedure Laterality Date    APPENDECTOMY      CATARACT EXTRACTION Right     COLONOSCOPY      2012? EGD  04/2019    upper- Hiatal hernia    ESOPHAGOGASTRODUODENOSCOPY      HYSTERECTOMY      TONSILLECTOMY         Performed at least 2 patient identifiers during session: Name, Morales [place], and ID bracelet         06/09/22 1045   PT Last Visit   PT Visit Date 06/09/22   Note Type   Note type Evaluation  (& treatment)   Pain Assessment   Pain Assessment Tool Prieto-Baker FACES   Prieto-Baker FACES Pain Rating 2   Pain Location/Orientation Location: Generalized   Effect of Pain on Daily Activities limits comfort and safety with ADLs & mobility   Patient's Stated Pain Goal No pain   Hospital Pain Intervention(s) Repositioned; Ambulation/increased activity; Emotional support   Multiple Pain Sites No   Restrictions/Precautions   Weight Bearing Precautions Per Order No   Other Precautions Contact/isolation; Airborne/isolation;Droplet precautions;Cognitive;1:1;Chair Alarm; Bed Alarm;Multiple lines;Telemetry; Fall Risk;Pain  (+COVID-19, virtual 1:1)   Home Living   Type of [address] [place] [address] One level;Stairs to enter with rails  (pt unable to recall # of [place], states "a few", per EMR is a trailer home)   Additional Comments Pt unable to provide accurate PLOF or home setup information  Pt is a poor historian due to cognitive impairments, frequently reporting "I don't remember " Information obtained from EMR  Prior Function   Level of [place] Independent with ADLs and functional mobility; Needs assistance with IADLs   Lives With Daughter   Receives Help From Family   ADL Assistance Independent   IADLs Needs assistance   Falls in the last 6 months 0  (per EMR, pt has had no falls)   Vocational Retired   Comments Per EMR, pt lives with daughter, ambulates with no AD, has no h/o falls  General   Additional Pertinent History Pt admitted 2022 with COVID-19, NSTEMI, decreased PO intake, flu like symptoms  Family/Caregiver Present No   Cognition   Overall Cognitive Status Impaired   Arousal/Participation Cooperative   Orientation Level Oriented to person;Disoriented to place; Disoriented to time;Disoriented to situation   Memory Decreased recall of precautions;Decreased recall of recent events;Decreased short term memory   Following Commands Follows one step commands with increased time or repetition   Subjective   Subjective "My  and daughter both got the disease and   Now I have it  And now I'm dead "   RUE Assessment   RUE Assessment WFL   LUE Assessment   LUE Assessment WFL   RLE Assessment   RLE Assessment WFL  (grossly 3+/5 to 4-/5 MMT throughout)   LLE Assessment   LLE Assessment WFL  (grossly 3+/5 to 4-/5 MMT throughout)   Coordination   Movements are Fluid and Coordinated 1   Sensation [facility]   Light Touch   RLE Light Touch Grossly intact   LLE Light Touch Grossly intact   Proprioception   RLE Proprioception Grossly intact   LLE Proprioception Grossly Intact   Bed Mobility   Supine to Sit 5  Supervision   Additional items Assist x 1;HOB elevated; Bedrails; Increased time required;Verbal cues   Transfers   Sit to Stand 4  Minimal assistance Additional items Assist x 1;Verbal cues; Impulsive; Increased time required  (impulsive to initiate, increased time to complete)   Stand to Sit 4  Minimal assistance   Additional items Assist x 1;Verbal cues; Impulsive   Stand pivot 4  Minimal assistance   Additional items Assist x 1; Increased time required;Verbal cues   Ambulation/Elevation   Gait pattern Forward Flexion;Narrow ARVIND; Decreased foot clearance; Short stride;Knees flexed   Gait Assistance 4  Minimal assist   Additional items Assist x 1;Verbal cues; Tactile cues   Assistive Device None  (hand held assistance)   Distance 18ft x1   Stair Management Assistance Not tested   Balance   Static Sitting Good   Dynamic Sitting Fair   Static Standing Fair   Dynamic Standing Fair   Ambulatory Fair   Endurance Deficit   Endurance Deficit Yes   Activity Tolerance   Activity Tolerance Patient limited by fatigue  (generalized deconditioning, impaired cognition)   Medical Staff Made Aware Spoke with HUMBERTO SALEEM OT   Nurse Made Aware Spoke with RN [facility] pre/post session   Assessment   Prognosis Good   Problem List Decreased strength;Decreased endurance; Impaired balance;Decreased mobility; Decreased cognition; Impaired judgement;Decreased safety awareness;Pain   Assessment Pt seen for PT evaluation, cleared by RN [facility], activity orders of "activity as tolerated, ambulate patient"  PT consult received for mobility assessment & discharge needs  Pt admitted 6/7/2022 w/ flu like symptoms, dec PO intake, NSTEMI, COVID-19  Comorbidities affecting pt's fnxl performance include: Alzheimer's dementia, asthma, HTN, HLD, RA< DM, depression, CKD, neuropathy, gout  PTA, pt was independent with functional mobility with no AD, independent with ADLS, requiring assist for IADLS and living with her daughter in a 1 level home with unknown # of steps to enter   During PT IE, pt requires S for bed mobility in hospital bed, JULY for all transfers and short in room distance functional mobility w/ no AD x multiple trials  The AM-PAC objective tool in combination w/ Barthel Index outcome tool were used to assist in determining pt safety w/ mobility/ADLs & appropriate d/c recommendations, see above for scores  Pt is at risk of falls d/t multiple comorbidities, impulsivity, impaired balance, impaired cognition, impaired insight/safety awareness, varying levels of pain , advanced age, acuity of medical illness and ongoing medical treatment of primary dx  Pt's clinical presentation is currently unstable/unpredictable as seen in pt's presentation of changing level of pain, varying levels of cognitive performance, increased fall risk, new onset of impairment of functional mobility, decreased endurance and new onset of weakness, and requires high complexity clinical decision making  Pt will benefit from continued PT treatment in order to address impairments, decrease risk of falls, maximize independence w/ fnxl mobility, & ensure safety w/ mobility for transition to next level of care  Based on pt presentation & impairments, pt would most appropriately benefit from post acute STR, vs home with HHPT + 24hr care - pending progress and level of assist able to be provided  Barriers to Discharge Decreased caregiver support   Goals   Patient Goals pt unable to state rehab goals on this date due to cognitive impairments, will f/u   STG Expiration Date 06/19/22   Short Term Goal #1 Patient PT goals established in order to maximize pt safety and indep with mobility and return to PLOF   Pt will: complete all transfers independently in order to increase safety with functional mobility; ambulate >100ftt with LRAD and S in order to increase safety with household and in facility distance functional mobility; negotiate 3-5 stairs with HR assist and S in order to facilitate ability to enter/ecit her home with family assist; improve B LE strength to >/= 4/5 MMT t/o in order to increase safety with functional mobility and decrease risk of falls; improve ambulatory balance to >/= good grade w/ LRAD in order to promote safety and increased independence with mobility; tolerate >3hrs OOB in upright position, in order to improve muscular endurance and respiratory status; improve AM-PAC score to >/= 22/24 in order to increase independence with mobility and decrease burden of care; improve Barthel Index score to >/= 45/100 in order to increase independence and decrease risk of falls  PT Treatment Day 1   Plan   Treatment/Interventions Functional transfer training;LE strengthening/ROM; Elevations; Therapeutic exercise; Endurance training;Cognitive reorientation;Patient/family training;Equipment eval/education;Gait training;Spoke to MD;Spoke to nursing;Spoke to case management   PT Frequency 3-5x/wk   Recommendation   PT Discharge Recommendation Post acute rehabilitation services  (vs home with HHPT + 24hr care pending progress & level of assistance able to be provided at home)   AM-PAC Basic Mobility Inpatient   Turning in Bed Without Bedrails 3   Lying on Back to Sitting on Edge of Flat Bed 3   Moving Bed to Chair 3   Standing Up From Chair 3   Walk in Room 3   Climb 3-5 Stairs 2   Basic Mobility Inpatient Raw Score 17   Basic Mobility Standardized Score 39 67   Highest Level Of Mobility   -HLM Goal 5: Stand one or more mins   JH-HLM Achieved 6: Walk 10 steps or more   Modified [place] Scale   Modified [place] Scale 4   Barthel Index   Feeding 5   Bathing 0   Grooming Score 0   Dressing Score 5   Bladder Score 5   Bowels Score 5   Toilet Use Score 5   Transfers (Bed/Chair) Score 10   Mobility (Level Surface) Score 0   Stairs Score 0   Barthel Index Score 35   Additional Treatment Session   Start Time 1013   End Time 1045   Treatment Assessment Pt is agreeable to participate in additional mobility training post IE   Pt completes transfers with JULY, and ambulates 30ft x2 trials with no AD (HHA from therapist), no overt LOB however generalized weakness and instability  Pt with generalized fatigue post mobility efforts  Next session plan for ambulation training with SPC as pt may display improved balance and no physical assistance needed, will f/u  Regarding functional mobility, pt remains below her baseline level of functional mobility, however with ongoing PT services is anticipated to return to PLOF  Pt primarily limited due to impaired cognition  Continue to recommend post acute STR vs home with 24hr care and HHPT pending progress & level of assist able to be provided  Will continue skilled PT POC as able and appropriate to address functional impairments and progress towards therapy goals  Equipment Use Recommend RN staffing A x1 for all patient mobility during remainder of her stay  Pt primarily limited due to cognitive impairments resulting in decreased insight and safety awareness  Additional Treatment Day 1   End of Consult   Patient Position at End of Consult Supine;Bed/Chair alarm activated; All needs within reach  (virtual 1:1 active)   End of Consult Comments Based on patient's Keshav Bhat Highest Level of Mobility scores today, patient currently has a goal of -[facility] Levels: 6: WALK 10 STEPS OR MORE, to be completed with RN staffing each shift, in order to improve overall activity tolerance and mobility, combat hospital related deconditioning, and maximize outcomes for d/c from the acute care setting  The patient's AM-PAC Basic Mobility Inpatient Short Form Raw Score is 17  A Raw score of greater than 16 suggests the patient may benefit from discharge to home  Please also refer to the recommendation of the Physical Therapist for safe discharge planning, as AM-PAC score does not coincide with PT recommendation  While the [facility] score is indicative of home, therapist's current recommendation at this time of eval is for post acute STR vs Home with HHPT + 24hr care   The following factors influenced this difference in recommendation and needs to be considered in the big picture for discharge: stairs to enter home/to fully access home, requires 24/7 supervision and that is not currently available, requires constant cues to maintain safety awareness and mobility (at increased risk of falling without cues), and significantly impaired cognitive status with limits pt's ability to safely live independently  If these factors are able to be alleviated, then it would increase opportunity for patient to discharge home  PT will continue to follow during this admission and change recommendation as/if appropriate          Mitali Vann, PT, DPT   Available via [employer] 24/7  NPI # [id number]  [place] License - [id number]  1/5/9064

## 2022-06-09 NOTE — OCCUPATIONAL THERAPY NOTE
Occupational Therapy Evaluation + Treatment      Nani Phoenix    6/9/2022    Patient Active Problem List   Diagnosis    General weakness    UTI (urinary tract infection)    Neuropathy    Gout    Essential hypertension    Asthma    Depression    [facility]'s disease, [facility] onset ([facility] )    Continuous opioid dependence ([facility] )    Stage 3a chronic kidney disease ([facility] )    COVID-19    Elevated troponin       Past Medical History:   Diagnosis Date    Arthritis     Asthma     Cancer ([facility] )     Chondrocalcinosis     Chronic sciatica     Dementia ([place] [address] )     Diverticulosis     GERD (gastroesophageal reflux disease)     Gout     High blood pressure     History of low potassium     Hyperlipidemia     Insomnia     Iron deficiency anemia     Low vitamin D level     [facility]     Neuropathy     Stage 3a chronic kidney disease ([facility] ) 11/16/2021    Uterine cancer ([facility] )        Past Surgical History:   Procedure Laterality Date    APPENDECTOMY      CATARACT EXTRACTION Right     COLONOSCOPY      2012?  EGD  04/2019    upper- Hiatal hernia    ESOPHAGOGASTRODUODENOSCOPY      HYSTERECTOMY      TONSILLECTOMY        06/09/22 1005   OT Last Visit   OT Visit Date 06/09/22   Note Type   Note type Evaluation  (+ treatment)   Restrictions/Precautions   Weight Bearing Precautions Per Order No   Other Precautions 1:1;Cognitive; Chair Alarm; Bed Alarm;Contact/isolation; Airborne/isolation;Telemetry;Multiple lines; Fall Risk;Pain  (COVID +, virtual 1:1)   Pain Assessment   Pain Assessment Tool Prieto-Baker FACES   Prieto-Baker FACES Pain Rating 2   Pain Location/Orientation Location: Generalized   Pain Onset/Description Onset: Ongoing   Home Living   Type of [address] [place] [address] One level   Home Equipment   (denies use of AD, questionable accuracy)   Prior Function   Lives With Daughter  (per EMR)   Receives Help From Family  (daughter)   Falls in the last 6 months   (Pt unable to recall) Comments Pt poor historian d/t impaired cognition at time of IE, unable to recall information re: PLOF or home set up  Chart review provides little information re: social hx  Per EMR, pt lives with daughter in mobile home  Reports she is independent without AD  Will f/u with CM to obtain more information   Lifestyle   Autonomy Pt lives with daughter in 1 story mobile home,   Reciprocal Relationships daughter   Subjective   Subjective "oops, I think I broke your fingers!"   ADL   Eating Assistance 4  Minimal Assistance   Grooming Assistance 4  Minimal Assistance   [facility]  3  Moderate [address] 4  Minimal Assistance   Additional Comments Pt limited by confusion, impaired sequencing, attention to task, decreased safety awareness, and overall cognition status  Additionally, grossly unstable d/t decreased balance, decreased endurance   Bed Mobility   Supine to Sit 5  Supervision   Additional items HOB elevated; Increased time required   Sit to Supine 5  Supervision   Additional items Increased time required;Verbal cues   Additional Comments Denied dizziness c positional changes   Transfers   Sit to Stand 4  Minimal assistance   Additional items Verbal cues; Impulsive;Assist x 1  (increased time required for task, but impulsive to initiate)   Stand to Sit 4  Minimal assistance   Additional items Assist x 1;Verbal cues; Impulsive   Stand pivot 4  Minimal assistance   Additional items Assist x 1; Increased time required;Verbal cues   Additional Comments Max VC for proper hand placements during transfers, no AD used     Functional Mobility   Functional Mobility 4  Minimal assistance   Additional Comments functional mobility household distance within room, all mobility tasks generally impulsive, cueing for safety awareness, grossly unsteady   Additional items Hand hold assistance   Balance   Static Sitting Fair +   Dynamic Sitting Fair   Static Standing Fair -   Dynamic Standing Fair -   Activity Tolerance   Activity Tolerance Other (Comment)  (cognition)   Medical Staff Made Aware PT, HUMBERTO, SLIM   Nurse Made Aware MÓNICA Aragon made aware of outcomes, verbalized pt appropriate to see   RUE Assessment   RUE Assessment WFL  (full AROM, MMT 4+/5 throughout)   LUE Assessment   LUE Assessment WFL  (full AROM, MMT 4+/5 throughout `)   Hand Function   Gross Motor Coordination Functional   Fine Motor Coordination Functional   Sensation   Light Touch No apparent deficits   Vision-Basic Assessment   Current Vision Wears glasses all the time   Cognition   Overall Cognitive Status Impaired   Arousal/Participation Alert; Cooperative   Attention Difficulty attending to directions   Orientation Level Oriented to person;Disoriented to situation;Disoriented to time;Disoriented to place  (reporting current year as 80)   Memory Decreased recall of precautions;Decreased recall of recent events;Decreased short term memory;Decreased long term memory   Following Commands Follows one step commands with increased time or repetition   Comments (S)  Pt overall confused and disoriented throughout session, pleasant and redirectable  Alert and conversational, however pt noted to ramble and majority of speech nonsensical  Pt with possible hallucinations during session, reporting that muliple people "are dead"  continued reorientation efforts throughout session, however pt noted to have decreased recall of recent events/short term memory, repeating phrases and stories multiple times within session  Pt unable to recall who she lives with, where she lives, etc, stating "I really cant remember" to questioning  Pt has baseline dx of early onset dementia, however unable to determine baseline cognition vs current cognitive status   At this time, pt unable to attend to personal needs without assistance  Will continue to assess   Assessment   Limitation Decreased cognition;Decreased endurance;Decreased Safe judgement during ADL;Decreased ADL status; Decreased high-level ADLs; Decreased self-care trans   Prognosis Fair   Assessment Patient is a 80 y o  female seen for OT evaluation + treatment at [place] [address] following admission on 6/7/2022  s/p COVID-19  Comorbidities impacting functional performance include: early onset alzheimer, HTN, asthma, elevated troponin, generalized weakness, neuropathy, gout   Patient presents with active orders for OT eval and treat and Activity as tolerated   Performed at least 2 patient identifiers during session including name and wristband  Pt lives with daughter in 1 story mobile home, denies use of AD at baseline  Upon initial evaluation, patient requires min assistance for UB ADLs, min assistance for LB ADLs, and min assistance for transfers and functional mobility within room and bathroom with the use of hand held assistance  Based on functional eval, patient presents A&Ox1/4 with impaired  attention, impaired  safety awareness, and impaired  short term and long term memory  Occupational performance is affected by the following deficits: endurance ,  decreased muscular strength , decreased standing tolerance for self care tasks , decreased dynamic balance impacting functional reach, decreased activity tolerance , memory , attention span, orientation , impaired safety awareness  and impaired learning ability d/t current cognitive status  Based on these findings, functional performance deficits, and assessment findings, pt has been identified as a high complexity evaluation  Personal factors impacting performance at the time of evaluation include: decreased High fall risk , decreased insight toward deficits  and decreased recall of precautions    Patient would benefit from OT services to maximize level of functional independence and safety in self-care and transfers  Occupational areas to address include:bathing/showering, toileting, dressing , personal hygiene/grooming , bed mobility , functional mobility, transfer to all surfaces, safety procedures  and fall prevention   From OT standpoint, recommendation at time of D/C would be post-acute rehabilitation   If patient is to d/c home, pt needs formal cognitive assessment to determine recommended supervision level  Pt currently unable to attend to own needs  Goals   Patient Goals no rehab goals stated d/t impaired cognitive status   Plan   Treatment Interventions Functional transfer training;ADL retraining; Endurance training;Patient/family training;Cognitive reorientation;Continued evaluation; Activityengagement; Compensatory technique education;Equipment evaluation/education   Goal Expiration Date 06/19/22   OT Treatment Day 0   OT Frequency 3-5x/wk   Additional Treatment Session   Start Time 1045   End Time 1046   Treatment Assessment Pt participated in OT tx session following IE for further training of ADL independence and functional cognition  Pt completed transfer onto standard toilet with Min A d/t maximum level of cueing requiring for hand placement, sequencing through transfer, and proximity to sitting surface  Pt functionally able to complete pericare, however required assistance for thoroughness d/t cognitive impairment  No carryover demonstrated for hand placements during transfers d/t cognitive status  Cueing for maneuvering throughout environment safely  Continued cueing for attention to task during toileting, pt extremely easily distractible and hyperverbal    Additional Treatment Day 1   Recommendation   OT Discharge Recommendation Post acute rehabilitation services  (if pt to return home, pt requires 24/7 supervision to care for own needs at this time   Will continue to monitor cognition for safe DCP)   OT - OK to Discharge Yes  (once medically clear)   Additional Comments  Pt resting in bed at end of session with all needs met, call button in reach, virtual 1:1 in place   Additional Comments 2 The patient's raw score on the AM-PAC Daily Activity inpatient short form is 17, standardized score is 37 26, less than 39 4  Patients at this level are likely to benefit from discharge to post-acute rehabilitation services  Please refer to the recommendation of the Occupational Therapist for safe discharge planning  AM-PAC Daily Activity Inpatient   Lower Body Dressing 3   Bathing 3   Toileting 2   Upper Body Dressing 3   Grooming 3   Eating 3   Daily Activity Raw Score 17   Daily Activity Standardized Score (Calc for Raw Score >=11) 37 26   AM-[facility] Applied Cognition Inpatient   Following a Speech/Presentation 1   Understanding Ordinary Conversation 2   Taking Medications 1   Remembering Where Things Are Placed or Put Away 1   Remembering List of 4-5 Errands 1   Taking Care of Complicated Tasks 1   Applied Cognition Raw Score 7   Applied Cognition Standardized Score 15 17   Pt will complete UB ADLs Independent  with use of AE as needed for increased ADL independence within 10 days  Pt will complete LB ADLs Supervision  with use of AE as needed for increased ADL independence within 10 days  Pt will complete toileting Supervision  with use of DME for increased ADL independence within 10 days  Pt will demonstrate proper body mechanics to complete transfers and functional mobility with Supervision and use of AD for increased safety and functional mobility within 10 days  Pt will demonstrate standing tolerance of 8 with Supervision and use of AD for increased endurance and activity tolerance within 10 days  Pt will demonstrate OOB sitting tolerance of 2-4 hr/day for increased activity tolerance and engagement within 10 days  Pt will participate in 10 min of BUE therapeutic exercise to increase strength, ROM, and endurance during ADL/IADLs within 10 days       Pt will attend to treatment task or activity for 5 minutes without need for redirection to improve activity engagement within 10 days  Pt will participate in ongoing cognitive assessments to assist with safe D/C planning and recommendations  Pt will demonstrate proper body mechanics and fall prevention strategies during 100% of tx sessions for increased safety awareness during ADL/IADLs  Pt will verbalize and demonstrate understanding of energy conservation/deep breathing techniques for increased activity tolerance and endurance during meaningful activities       Beatriz Maynard, OT

## 2022-06-09 NOTE — ASSESSMENT & PLAN NOTE
· Presented with generalized weakness, fever, decreased appetite, cough for 1 week  · Family member tested (+) for COVID-19  · Pt COVID-19 (+) on 06/07/2022  · On mild pathway  On room air  Denies SOB  · Not a candidate for steroids, remdesivir, baricitinib  · Elevated D-dimer of 1 2   · However given advanced age, generalized weakness, high risk of falls will not be a good candidate for anticoagulation, Pt denying SOB, on room air, unremarkable CXR and also with national shortage of IV contrast so will defer CTA also having low suspicion of PE  · Continues to be febrile with T-max of 102°  likely in setting of COVID-19    · Procalcitonin remains within normal limits, No need for Abx therapy at this time  · Patient medically stable for Discharge to acute rehab

## 2022-06-09 NOTE — PROGRESS NOTES
Ashley 45  Progress Note - [facility] 7/4/1927, 80 y o  female MRN: [id number]  Unit/Bed#: MS [facility] Encounter: [id number]  Primary Care Provider: Chip Nelson MD   Date and time admitted to hospital: 6/7/2022  3:02 PM    * COVID-19  Assessment & Plan  · Presented with generalized weakness, fever, decreased appetite, cough for 1 week  · Family member tested +ve for COVID-19  · Pt COVID-19 +ve on 06/07/2022  · On mild pathway  On room air  Denies SOB  · Not a candidate for steroids, remdesivir, baricitinib  · Elevated D-dimer of 1 2 however given advanced age, generalized weakness, high risk of falls will not be a good candidate for anticoagulation, Pt denying SOB, on room air, unremarkable CXR and also with national shortage of IV contrast so will defer CTA also having low suspicion of PE  · Continues to be febrile with T-max of 102°  likely in setting of COVID-19  · Will avoid antibiotic given normal procalcitonin  Continue DVT prophylaxis  Trend fever/leukocyte count  COVID-19 mild pathway    Elevated troponin  Assessment & Plan  · Evaluated by Cardiology looks non MI related  · Troponins already downtrending  No EKG changes  Preserved EF but does have basal anterolateral and basal inferior lateral hypokinesia  Continue aspirin, statin  Low normal HR so will defer beta-blocker at this time as per cardiology recommendation    Al[facility]'s disease, early onset ([facility] )  Assessment & Plan  · Continue supportive care   · Regulate sleep/wake cycle  · Monitor mental status    Asthma  Assessment & Plan  · Does not appear to be in acute exacerbation  · Continue Home inhalers      VTE Pharmacologic Prophylaxis: VTE Score: 7 High Risk (Score >/= 5) - Pharmacological DVT Prophylaxis Ordered: heparin  Sequential Compression Devices Ordered  Patient Centered Rounds: I performed bedside rounds with nursing staff today    Discussions with Specialists or Other Care Team Provider: Cardiology    Education and Discussions with Family / Patient: Updated  (daughter) via phone  Time Spent for Care: 30 minutes  More than 50% of total time spent on counseling and coordination of care as described above  Current Length of Stay: 2 day(s)  Current Patient Status: Inpatient   Certification Statement: The patient will continue to require additional inpatient hospital stay due to generalized weakness requring PT/OT consultaiton and poor oral intake requiring IV hydration  Discharge Plan: Anticipate discharge in 24-48 hrs to discharge location to be determined pending rehab evaluations  Code Status: Level 3 - DNAR and DNI    Subjective:   Pt seen and examined  Communicated clearly  No particular overnight event reported  Hemodynamically stable  Patient states improvement in weakness however still febrile with 1 episode of fever overnight  Otherwise no complaints  Tolerating diet well  Weakness improving  Objective:     Vitals:   Temp (24hrs), Av 6 °F (37 6 °C), Min:98 2 °F (36 8 °C), Max:102 3 °F (39 1 °C)    Temp:  [98 2 °F (36 8 °C)-102 3 °F (39 1 °C)] 98 4 °F (36 9 °C)  HR:  [60-80] 60  Resp:  [17-20] 17  BP: (109-147)/(47-88) 109/47  SpO2:  [96 %-100 %] 96 %  Body mass index is 25 92 kg/m²  Input and Output Summary (last 24 hours):   No intake or output data in the 24 hours ending 22 [id number]    Physical Exam:   Physical Exam  Vitals and nursing note reviewed  Constitutional:       General: She is not in acute distress  Appearance: She is ill-appearing (Chronically)  HENT:      Head: Normocephalic  Cardiovascular:      Rate and Rhythm: Normal rate and regular rhythm  Pulses: Normal pulses  Heart sounds: Normal heart sounds  No murmur heard  Pulmonary:      Effort: Pulmonary effort is normal  No respiratory distress  Abdominal:      General: Bowel sounds are normal  There is no distension  Palpations: Abdomen is soft  Musculoskeletal:         General: Normal range of motion  Cervical back: Normal range of motion  Right lower leg: No edema  Left lower leg: No edema  Skin:     General: Skin is warm and dry  Capillary Refill: Capillary refill takes less than 2 seconds  Neurological:      Mental Status: She is alert  Mental status is at baseline  Motor: No weakness  Comments: Alert and oriented to person, place and time  Disoriented to situation   Psychiatric:         Mood and Affect: Mood normal          Speech: Speech normal          Behavior: Behavior is cooperative           Judgment: Judgment normal           Additional Data:     Labs:  Results from last 7 days   Lab Units 06/09/22  0608   WBC Thousand/uL 4 25*   HEMOGLOBIN g/dL 11 2*   HEMATOCRIT % 35 9   PLATELETS Thousands/uL 205   NEUTROS PCT % 74   LYMPHS PCT % 17   MONOS PCT % 8   EOS PCT % 1     Results from last 7 days   Lab Units 06/09/22  0608   SODIUM mmol/L 141   POTASSIUM mmol/L 3 4*   CHLORIDE mmol/L 106   CO2 mmol/L 26   BUN mg/dL 14   CREATININE mg/dL 0 94   ANION GAP mmol/L 9   CALCIUM mg/dL 8 8   ALBUMIN g/dL 3 1*   TOTAL BILIRUBIN mg/dL 0 43   ALK PHOS U/L 53   ALT U/L 12   AST U/L 21   GLUCOSE RANDOM mg/dL 83                 Results from last 7 days   Lab Units 06/09/22  0608 06/08/22  0609 06/07/22  1542   LACTIC ACID mmol/L  --   --  0 8   PROCALCITONIN ng/ml 0 13 0 08  --        Lines/Drains:  Invasive Devices  Report    Peripheral Intravenous Line  Duration           Peripheral IV 06/07/22 Right Antecubital 1 day                  Telemetry:  Telemetry Orders (From admission, onward)             48 Hour Telemetry Monitoring  Continuous x 48 hours        References:    Telemetry Guidelines   Question:  Reason for 48 Hour Telemetry  Answer:  Arrhythmias Requiring Medical Therapy (eg  SVT, Vtach/fib, Bradycardia, Uncontrolled A-fib)                 Telemetry Reviewed: Normal Sinus Rhythm  Indication for Continued Telemetry Use: No indication for continued use  Will discontinue  Imaging: Reviewed radiology reports from this admission including: chest xray    Recent Cultures (last 7 days):         Last 24 Hours Medication List:   Current Facility-Administered Medications   Medication Dose Route Frequency Provider Last Rate    acetaminophen  650 mg Oral Q6H PRN Cassi Flores MD      aluminum-magnesium hydroxide-simethicone  30 mL Oral Q6H PRN Cassi Flores MD      aspirin  81 mg Oral Daily Cassi Flores MD      vitamin B-12  1,000 mcg Oral Daily Cassi Flores MD      fluticasone-vilanterol  1 puff Inhalation Daily Cassi Flores MD      gabapentin  100 mg Oral 4x Daily Estella Thomas PA-C      heparin (porcine)  5,000 Units Subcutaneous [facility] Cassi Flores MD      melatonin  3 mg Oral HS Estella Thomas PA-C      ondansetron  4 mg Intravenous Q6H PRN Cassi Flores MD      pantoprazole  40 mg Oral Early Morning Cassi Flores MD      pravastatin  10 mg Oral Daily Cassi Flores MD          Today, Patient Was Seen By: Beatriz Hayes MD    **Please Note: This note may have been constructed using a voice recognition system  **

## 2022-06-09 NOTE — PLAN OF CARE
Problem: OCCUPATIONAL THERAPY ADULT  Goal: Performs self-care activities at highest level of function for planned discharge setting  See evaluation for individualized goals  Description: Treatment Interventions: Functional transfer training, ADL retraining, Endurance training, Patient/family training, Cognitive reorientation, Continued evaluation, Activityengagement, Compensatory technique education, Equipment evaluation/education          See flowsheet documentation for full assessment, interventions and recommendations  6/9/2022 1244 by Lindy Sandoval OT  Note: Limitation: Decreased cognition, Decreased endurance, Decreased Safe judgement during ADL, Decreased ADL status, Decreased high-level ADLs, Decreased self-care trans  Prognosis: Fair  Assessment: Patient is a 80 y o  female seen for OT evaluation + treatment at [place] [address] following admission on 6/7/2022  s/p COVID-19  Comorbidities impacting functional performance include: early onset alzheimer, HTN, asthma, elevated troponin, generalized weakness, neuropathy, gout   Patient presents with active orders for OT eval and treat and Activity as tolerated   Performed at least 2 patient identifiers during session including name and wristband  Pt lives with daughter in 1 story mobile home, denies use of AD at baseline  Upon initial evaluation, patient requires min assistance for UB ADLs, min assistance for LB ADLs, and min assistance for transfers and functional mobility within room and bathroom with the use of hand held assistance  Based on functional eval, patient presents A&Ox1/4 with impaired  attention, impaired  safety awareness, and impaired  short term and long term memory   Occupational performance is affected by the following deficits: endurance ,  decreased muscular strength , decreased standing tolerance for self care tasks , decreased dynamic balance impacting functional reach, decreased activity tolerance , memory , attention span, orientation , impaired safety awareness  and impaired learning ability d/t current cognitive status  Based on these findings, functional performance deficits, and assessment findings, pt has been identified as a high complexity evaluation  Personal factors impacting performance at the time of evaluation include: decreased High fall risk , decreased insight toward deficits  and decreased recall of precautions   Patient would benefit from OT services to maximize level of functional independence and safety in self-care and transfers  Occupational areas to address include:bathing/showering, toileting, dressing , personal hygiene/grooming , bed mobility , functional mobility, transfer to all surfaces, safety procedures  and fall prevention   From OT standpoint, recommendation at time of D/C would be post-acute rehabilitation   If patient is to d/c home, pt needs formal cognitive assessment to determine recommended supervision level  Pt currently unable to attend to own needs  OT Discharge Recommendation: Post acute rehabilitation services (if pt to return home, pt requires supervision to care for own needs at this time   Will continue to monitor cognition for safe DCP)  OT - OK to Discharge: Yes (once medically clear)

## 2022-06-09 NOTE — CASE MANAGEMENT
Case Management Assessment & Discharge Planning Note    Patient name Zak Simons  Location /-20 MRN [id number]  : 1927 Date 2022       Current Admission Date: 2022  Current Admission Diagnosis:COVID-19   Patient Active Problem List    Diagnosis Date Noted    COVID-19 2022    Elevated troponin 2022    Alzheimer's disease, early onset ([facility] [address] [facility] ) 2021    Continuous opioid dependence ([facility] ) 2021    Stage 3a chronic kidney disease ([facility] [address] [facility] ) 2021    Depression 2017    General weakness 2017    UTI (urinary tract infection) 2017    Neuropathy 2017    Gout 2017    Essential hypertension 2017    Asthma 2017      LOS (days): 2  Geometric Mean LOS (GMLOS) (days): 3 00  Days to GMLOS:1     OBJECTIVE:    Risk of Unplanned Readmission Score: 18 26         Current admission status: Inpatient       Preferred Pharmacy:   [facility], [address] [address]  Phone: [phone] Fax: [phone]    Primary Care Provider: Bandar Pedroza MD    Primary Insurance: [facility]  Secondary Insurance:     ASSESSMENT:  Marissa Moore Proxies    There are no active Health Care Proxies on file  Patient Information  Admitted from[de-identified] Home  Mental Status: Confused  During Assessment patient was accompanied by: Not accompanied during assessment  Assessment information provided by[de-identified] Daughter  Primary Caregiver: Self  Support Systems: [id number] [facility] 18 entry access options   Select all that apply : Stairs  Number of steps to enter home : 4  Do the steps have railings?: Yes  Type of Current Residence: [facility]  In the last 12 months, was there a time when you were not able to pay the mortgage or rent on time?: No  In the last 12 months, was there a time when you did not have a steady place to sleep or slept in a shelter (including now)?: No  Homeless/housing insecurity resource given?: No  Living Arrangements: Lives w/ Daughter    Activities of Daily Living Prior to Admission  Functional Status: Independent  Completes ADLs independently?: Yes  Ambulates independently?: Yes  Does patient use assisted devices?: Yes  Assisted Devices (DME) used: Bedside Commode  Does patient currently own DME?: Yes  What DME does the patient currently own?: Straight Darrol Flattery, Bedside Commode  Does patient have a history of Outpatient Therapy (PT/OT)?: No  Does the patient have a history of Short-Term Rehab?: No  Does patient have a history of HHC?: No  Does patient currently have [address]?: No         Patient Information Continued  Within the past 12 months, you worried that your food would run out before you got the money to buy more : Never true  Within the past 12 months, the food you bought just didn't last and you didn't have money to get more : Never true  Does patient receive dialysis treatments?: No         Means of Transportation  Means of Transport to Appts[de-identified] Family transport  In the past 12 months, has lack of transportation kept you from medical appointments or from getting medications?: No  In the past 12 months, has lack of transportation kept you from meetings, work, or from getting things needed for daily living?: No  Was application for public transport provided?: No        DISCHARGE DETAILS:    Discharge planning discussed with[de-identified] pt's daughterAlexandra of Choice: Yes  Comments - Freedom of Choice: no preference for STR - blanket referral sent  CM contacted family/caregiver?: Yes  Were Treatment Team discharge recommendations reviewed with patient/caregiver?: Yes  Did patient/caregiver verbalize understanding of patient care needs?: N/A- going to facility  Were patient/caregiver advised of the risks associated with not following Treatment Team discharge recommendations?: Yes    Contacts  Patient Contacts: Linda Ag (Child)  Relationship to Patient[de-identified] Family  Contact Method: Phone  Phone Number: [phone] Gulshan King  Reason/Outcome: Discharge Planning, Emergency Contact, Referral    Requested [address]         Is the patient interested in [facility] at discharge?: No    DME Referral Provided  Referral made for DME?: No    Other Referral/Resources/Interventions Provided:  Interventions: Short Term Rehab  Referral Comments: Patient admitted due to COVID-19  mild pathway, IV hydration  PT/OT rcmd STR  CM contacted daughter, Carine Matos, via phone to discuss assessment and dcp  Pt live with dtr in trailer home, [address]  Pt independent with ADLs, owns walker and cane if needed but does not use, and daughter provides transport to appointments  Confirmed PCP Mindy Valentin) and preferred pharmacy ([facility])  Carine Matos (dtr) relayed she is pt's healthcare POA, with pt's other daughter, Clarence Aly, as secondary  IMM reviewed with daughter via phone  Dtr also provided covid vaccine information - Jose Luis Burnett first dose on 12/23/21 and second dose 1/13/22 [id number]  Discussed PT/OT rcmd for STR, dtr relayed no preference and is open to blanket referral -  does not want [place]  STR blanket referral sent via ecin, awaiting responses  Dtr aware bed availability would be limited due to covid positive status  CM to f/u with dtr in AM re: rehab options      Would you like to participate in our [address] service program?  : No - Declined    Treatment Team Recommendation: Short Term Rehab  Discharge Destination Plan[de-identified] Short Term Rehab                                IMM Given (Date):: 06/09/22  IMM Given to[de-identified] Family  Family notified[de-identified] Daughter Epifanio Blanco) Alert and oriented to person, place, time/situation. normal mood and affect

## 2022-06-10 NOTE — PLAN OF CARE
Problem: Prexisting or High Potential for Compromised Skin Integrity  Goal: Skin integrity is maintained or improved  Description: INTERVENTIONS:  - Identify patients at risk for skin breakdown  - Assess and monitor skin integrity  - Assess and monitor nutrition and hydration status  - Monitor labs   - Assess for incontinence   - Turn and reposition patient  - Assist with mobility/ambulation  - Relieve pressure over bony prominences  - Avoid friction and shearing  - Provide appropriate hygiene as needed including keeping skin clean and dry  - Evaluate need for skin moisturizer/barrier cream  - Collaborate with interdisciplinary team   - Patient/family teaching  - Consider wound care consult   Outcome: Progressing     Problem: PAIN - ADULT  Goal: Verbalizes/displays adequate comfort level or baseline comfort level  Description: Interventions:  - Encourage patient to monitor pain and request assistance  - Assess pain using appropriate pain scale  - Administer analgesics based on type and severity of pain and evaluate response  - Implement non-pharmacological measures as appropriate and evaluate response  - Consider cultural and social influences on pain and pain management  - Notify physician/advanced practitioner if interventions unsuccessful or patient reports new pain  Outcome: Progressing     Problem: INFECTION - ADULT  Goal: Absence or prevention of progression during hospitalization  Description: INTERVENTIONS:  - Assess and monitor for signs and symptoms of infection  - Monitor lab/diagnostic results  - Monitor all insertion sites, i e  indwelling lines, tubes, and drains  - Monitor endotracheal if appropriate and nasal secretions for changes in amount and color  - Doon appropriate cooling/warming therapies per order  - Administer medications as ordered  - Instruct and encourage patient and family to use good hand hygiene technique  - Identify and instruct in appropriate isolation precautions for identified infection/condition  Outcome: Progressing     Problem: SAFETY ADULT  Goal: Patient will remain free of falls  Description: INTERVENTIONS:  - Educate patient/family on patient safety including physical limitations  - Instruct patient to call for assistance with activity   - Consult OT/PT to assist with strengthening/mobility   - Keep Call bell within reach  - Keep bed low and locked with side rails adjusted as appropriate  - Keep care items and personal belongings within reach  - Initiate and maintain comfort rounds  - Make Fall Risk Sign visible to staff  - Offer Toileting every 2 Hours, in advance of need  - Initiate/Maintain alarm  - Obtain necessary fall risk management equipment:   - Apply yellow socks and bracelet for high fall risk patients  - Consider moving patient to room near nurses station  Outcome: Progressing  Goal: Maintain or return to baseline ADL function  Description: INTERVENTIONS:  -  Assess patient's ability to carry out ADLs; assess patient's baseline for ADL function and identify physical deficits which impact ability to perform ADLs (bathing, care of mouth/teeth, toileting, grooming, dressing, etc )  - Assess/evaluate cause of self-care deficits   - Assess range of motion  - Assess patient's mobility; develop plan if impaired  - Assess patient's need for assistive devices and provide as appropriate  - Encourage maximum independence but intervene and supervise when necessary  - Involve family in performance of ADLs  - Assess for home care needs following discharge   - Consider OT consult to assist with ADL evaluation and planning for discharge  - Provide patient education as appropriate  Outcome: Progressing  Goal: Maintains/Returns to pre admission functional level  Description: INTERVENTIONS:  - Perform BMAT or MOVE assessment daily    - Set and communicate daily mobility goal to care team and patient/family/caregiver     - Collaborate with rehabilitation services on mobility goals if consulted  - Perform Range of Motion 4 times a day  - Reposition patient every 2 hours  - Out of bed for toileting  - Record patient progress and toleration of activity level   Outcome: Progressing     Problem: DISCHARGE PLANNING  Goal: Discharge to home or other facility with appropriate resources  Description: INTERVENTIONS:  - Identify barriers to discharge w/patient and caregiver  - Arrange for needed discharge resources and transportation as appropriate  - Identify discharge learning needs (meds, wound care, etc )  - Arrange for interpretive services to assist at discharge as needed  - Refer to Case Management Department for coordinating discharge planning if the patient needs post-hospital services based on physician/advanced practitioner order or complex needs related to functional status, cognitive ability, or social support system  Outcome: Progressing     Problem: Knowledge Deficit  Goal: Patient/family/caregiver demonstrates understanding of disease process, treatment plan, medications, and discharge instructions  Description: Complete learning assessment and assess knowledge base    Interventions:  - Provide teaching at level of understanding  - Provide teaching via preferred learning methods  Outcome: Progressing     Problem: Potential for Falls  Goal: Patient will remain free of falls  Description: INTERVENTIONS:  - Educate patient/family on patient safety including physical limitations  - Instruct patient to call for assistance with activity   - Consult OT/PT to assist with strengthening/mobility   - Keep Call bell within reach  - Keep bed low and locked with side rails adjusted as appropriate  - Keep care items and personal belongings within reach  - Initiate and maintain comfort rounds  - Make Fall Risk Sign visible to staff  - Offer Toileting every 2 Hours, in advance of need  - Initiate/Maintain alarm  - Obtain necessary fall risk management equipment:   - Apply yellow socks and bracelet for high fall risk patients  - Consider moving patient to room near nurses station  Outcome: Progressing     Problem: Nutrition/Hydration-ADULT  Goal: Nutrient/Hydration intake appropriate for improving, restoring or maintaining nutritional needs  Description: Monitor and assess patient's nutrition/hydration status for malnutrition  Collaborate with interdisciplinary team and initiate plan and interventions as ordered  Monitor patient's weight and dietary intake as ordered or per policy  Utilize nutrition screening tool and intervene as necessary  Determine patient's food preferences and provide high-protein, high-caloric foods as appropriate       INTERVENTIONS:  - Monitor oral intake, urinary output, labs, and treatment plans  - Assess nutrition and hydration status and recommend course of action  - Evaluate amount of meals eaten  - Assist patient with eating if necessary   - Allow adequate time for meals  - Recommend/ encourage appropriate diets, oral nutritional supplements, and vitamin/mineral supplements  - Order, calculate, and assess calorie counts as needed  - Recommend, monitor, and adjust tube feedings and TPN/PPN based on assessed needs  - Assess need for intravenous fluids  - Provide specific nutrition/hydration education as appropriate  - Include patient/family/caregiver in decisions related to nutrition  Outcome: Progressing     Problem: MOBILITY - ADULT  Goal: Maintain or return to baseline ADL function  Description: INTERVENTIONS:  -  Assess patient's ability to carry out ADLs; assess patient's baseline for ADL function and identify physical deficits which impact ability to perform ADLs (bathing, care of mouth/teeth, toileting, grooming, dressing, etc )  - Assess/evaluate cause of self-care deficits   - Assess range of motion  - Assess patient's mobility; develop plan if impaired  - Assess patient's need for assistive devices and provide as appropriate  - Encourage maximum independence but intervene and supervise when necessary  - Involve family in performance of ADLs  - Assess for home care needs following discharge   - Consider OT consult to assist with ADL evaluation and planning for discharge  - Provide patient education as appropriate  Outcome: Progressing  Goal: Maintains/Returns to pre admission functional level  Description: INTERVENTIONS:  - Perform BMAT or MOVE assessment daily    - Set and communicate daily mobility goal to care team and patient/family/caregiver  - Collaborate with rehabilitation services on mobility goals if consulted  - Perform Range of Motion 4 times a day  - Reposition patient every 2 hours    - Dangle patient 3 times a day  - Stand patient 3 times a day  - Ambulate patient 3 times a day  - Out of bed to chair 3 times a day   - Out of bed for meals 3 times a day  - Out of bed for toileting  - Record patient progress and toleration of activity level   Outcome: Progressing

## 2022-06-10 NOTE — CASE MANAGEMENT
Case Management Discharge Planning Note    Patient name Cely Johnson  Location /-91 MRN [id number]  : 1927 Date 6/10/2022       Current Admission Date: 2022  Current Admission Diagnosis:COVID-19   Patient Active Problem List    Diagnosis Date Noted    COVID-19 2022    Elevated troponin 2022    Alzheimer's disease, early onset ([facility] [address] [facility] ) 2021    Continuous opioid dependence ([facility] [address] ) 2021    Stage 3a chronic kidney disease ([facility] [address] [facility] ) 2021    Depression 2017    General weakness 2017    UTI (urinary tract infection) 2017    Neuropathy 2017    Gout 2017    Essential hypertension 2017    Asthma 2017      LOS (days): 3  Geometric Mean LOS (GMLOS) (days): 3 00  Days to GMLOS:0     OBJECTIVE:  Risk of Unplanned Readmission Score: 19 1         Current admission status: Inpatient   Preferred Pharmacy:   [address] [address], [address] [place] [address] [address] [address]  Phone: [phone] Fax: [phone]    Primary Care Provider: Safia De Leon MD    Primary Insurance: [facility]  Secondary Insurance:     DISCHARGE DETAILS:    Discharge planning discussed with[de-identified] daughterVani    Freedom of Choice: Yes  Comments - Freedom of Choice: family choice for [address]   CM contacted family/caregiver?: Yes  Were Treatment Team discharge recommendations reviewed with patient/caregiver?: Yes  Did patient/caregiver verbalize understanding of patient care needs?: N/A- going to facility  Were patient/caregiver advised of the risks associated with not following Treatment Team discharge recommendations?: Yes    Contacts  Patient Contacts: Jax Saldana (Child)  Relationship to Patient[de-identified] Family  Contact Method: Phone  Phone Number: [phone] Be Molina)  Reason/Outcome: Discharge Planning, Referral    [facility]ed  [facility]         Is the patient interested in [facility] at discharge?: No    DME Referral Provided  Referral made for DME?: No    Other Referral/Resources/Interventions Provided:  Interventions: Short Term Rehab  Referral Comments: CM followed up with daughter, Vani Saldana, via phone re: STR options  Daughter relayed choice for [address]  CM followed up with cheli armas, krys, re: family choice for [facility]O  Liaison placed auth for placement, liaison notified CM of auth received  CM placed [facility] transportation referral to [facility] via ecin, transportation confirmed for 1900 tonight via ambucab  CM attempted to reach daughter via phone, left VM notifying of transport time to [facility]  Care team and facility notified of same           Treatment Team Recommendation: Short Term Rehab  Discharge Destination Plan[de-identified] Short Term Rehab  Transport at Discharge : [address] [facility]  ETA of Transport (Time): [address] Name, [facility] : [address]   Receiving Facility/Agency Phone Number: [phone]  Facility/Agency Fax Number: [phone]

## 2022-06-10 NOTE — CASE MANAGEMENT
Case Management Discharge Planning Note    Patient name Matt Bradford  Location /-09 MRN [id number]  : 1927 Date 6/10/2022       Current Admission Date: 2022  Current Admission Diagnosis:COVID-19   Patient Active Problem List    Diagnosis Date Noted    COVID-19 2022    Elevated troponin 2022    Alzheimer's disease, early onset ([facility] [address] [facility] ) 2021    Continuous opioid dependence ([facility] [address] ) 2021    Stage 3a chronic kidney disease ([facility] [address] ) 2021    Depression 2017    General weakness 2017    UTI (urinary tract infection) 2017    Neuropathy 2017    Gout 2017    Essential hypertension 2017    Asthma 2017      LOS (days): 3  Geometric Mean LOS (GMLOS) (days): 3 00  Days to GMLOS:0     OBJECTIVE:  Risk of Unplanned Readmission Score: 19 1         Current admission status: Inpatient   Preferred Pharmacy:   [address] [address], [address] [address]  Phone: [phone] Fax: [phone]    Primary Care Provider: Omar Denney MD    Primary Insurance: [facility]  Secondary Insurance:     DISCHARGE DETAILS:    Discharge planning discussed with[de-identified] daughterLos    Freedom of Choice: Yes  Comments - Freedom of Choice: family choice for [address]   CM contacted family/caregiver?: Yes  Were Treatment Team discharge recommendations reviewed with patient/caregiver?: Yes  Did patient/caregiver verbalize understanding of patient care needs?: N/A- going to facility  Were patient/caregiver advised of the risks associated with not following Treatment Team discharge recommendations?: Yes    Contacts  Patient Contacts: Jaren He (Child)  Relationship to Patient[de-identified] Family  Contact Method: Phone  Phone Number: [phone] Ramos Barber  Reason/Outcome: Discharge Planning, Referral    [facility]         Is the patient interested in [facility] at discharge?: No    DME Referral Provided  Referral made for DME?: No    Other Referral/Resources/Interventions Provided:  Interventions: Short Term Rehab  Referral Comments: CM followed up with daughter, Stefania Anderson, via phone re: STR options  Daughter relayed choice for [address]  CM followed up with cheli armas, krys, re: family choice for [facility]  Liaison placed auth for placement, liaison notified CM of auth received  CM placed [facility] transportation referral to [facility] via ecin, transportation confirmed for 1900 tonight via ambucab  CM attempted to reach daughter via phone, left  notifying of transport time to OO  Care team and facility notified of same           Treatment Team Recommendation: Short Term Rehab  Discharge Destination Plan[de-identified] Short Term Rehab  Transport at Discharge : [address] [facility] 06[facility]10/[facility]  ETA of Transport (Time): [address] Name, [facility] : [address]   Receiving Facility/Agency Phone Number: [phone]  Facility/Agency Fax Number: [phone]

## 2022-06-10 NOTE — DISCHARGE SUMMARY
Ashley 45  Discharge- onda Longest 7/4/1927, 80 y o  female MRN: [id number]  Unit/Bed#: [facility] Encounter: [id number]  Primary Care Provider: Patel Hale MD   Date and time admitted to hospital: 6/7/2022  3:02 PM    * COVID-19  Assessment & Plan  · Presented with generalized weakness, fever, decreased appetite, cough for 1 week  · Family member tested +ve for COVID-19  · Pt COVID-19 +ve on 06/07/2022  · On mild pathway  On room air  Denies SOB  · Not a candidate for steroids, remdesivir, baricitinib  · Elevated D-dimer of 1 2 however given advanced age, generalized weakness, high risk of falls will not be a good candidate for anticoagulation, Pt denying SOB, on room air, unremarkable CXR and also with national shortage of IV contrast so will defer CTA also having low suspicion of PE  · Continues to be febrile with T-max of 102°  likely in setting of COVID-19  · Will avoid antibiotic given normal procalcitonin  Continue DVT prophylaxis  Trend fever/leukocyte count  COVID-19 mild pathway    Elevated troponin  Assessment & Plan  · Evaluated by Cardiology looks non MI related  · Troponins already downtrending  No EKG changes  Preserved EF but does have basal anterolateral and basal inferior lateral hypokinesia      Continue aspirin, statin  Low normal HR so will defer beta-blocker at this time as per cardiology recommendation    Alzheimer's disease, early onset [facility])  Assessment & Plan  · Continue supportive care   · Regulate sleep/wake cycle  · Monitor mental status    Asthma  Assessment & Plan  · Does not appear to be in acute exacerbation  · Continue Home inhalers      Medical Problems             Resolved Problems  Date Reviewed: 6/10/2022   None               Discharging Physician / Practitioner: Romayne Redman, CRNP  PCP: Patel Hale MD  Admission Date:   Admission Orders (From admission, onward)     Ordered        06/07/22 1652  Inpatient Admission  Once Discharge Date: 06/10/22    Consultations During Hospital Stay:  · Cardiology  · Physical Therapy/Occupational Therapy    Procedures Performed:   · None    Significant Findings / Test Results:     XR chest 1 view portable   Final Result by Candice Hendricks MD (06/07 1636)      No acute cardiopulmonary disease  Workstation performed: [id number]             Incidental Findings:   · None    Test Results Pending at Discharge (will require follow up): · None     Outpatient Tests Requested:  · Recommend outpatient follow up with PCP  · Recommend repeat BMP within 7-10 days of discharge    Complications:  None    Reason for Admission: COVID-19 Infection    Hospital Course:   Fara Huizar is a 80 y o  female patient with PMH of bronchial asthma, hypertension, hyperlipidemia, alzheimer's dementia who originally presented to the hospital on 6/7/2022 due to COVID-19 infection  Patient presented with increasing generalized weakness and decreased appetite and poor p o  Intake for the past 1 week  Patient also noted to have the low-grade fever and chills with cough which was dry  Upon arrival to the ED, patient tested positive for COVID-19 infection  Patient was greater than 1 week out and remained free of respiratory distress while admitted, patient did not receive any COVID-19 treatment  Patient with intermittent febrile episodes, all believed secondary to the acute viral infection  Patient with elevated D-dimer while admitted but low suspicion of PE at this time due to no acute respiratory distress, and patient with normal heart rate/ respiratory rate  While admitted patient with elevated troponins  Cardiology was consulted  Echo was performed and revealed  basal anterolateral and basal inferior lateral hypokinesia  Patient without any chest pain or shortness of breath   Discussed with cardiology, unknown if new change but due to advanced age would not likely be a candidate for any interventions  Continue with aspirin/statin therapy  Physical therapy and occupational therapy evaluated the patient and recommended patient be placed at short term rehab  Patient medically stable for discharge  All questions were answered  Please see above list of diagnoses and related plan for additional information  Condition at Discharge: fair    Discharge Day Visit / Exam:   Subjective:  Patient asleep but awakes to verbal stimuli  Patient alert to person but disoriented to place, time and situation  Patient stated she feels very tired today  Vitals: Blood Pressure: 142/72 (06/10/22 0748)  Pulse: 62 (06/10/22 0748)  Temperature: 100 1 °F (37 8 °C) (06/10/22 0748)  Temp Source: Tympanic (06/10/22 0748)  Respirations: 19 (06/10/22 0748)  Height: 5' (152 4 cm) (06/08/22 0758)  Weight - Scale: 60 2 kg (132 lb 11 5 oz) (06/09/22 0500)  SpO2: 98 % (06/10/22 0955)  Exam:   Physical Exam  Vitals and nursing note reviewed  Constitutional:       General: She is sleeping  She is not in acute distress  Appearance: She is not ill-appearing  HENT:      Head: Normocephalic  Nose: Nose normal  No congestion  Mouth/Throat:      Mouth: Mucous membranes are moist       Pharynx: Oropharynx is clear  Cardiovascular:      Rate and Rhythm: Normal rate and regular rhythm  Pulses: Normal pulses  Heart sounds: Normal heart sounds  No murmur heard  Pulmonary:      Effort: Pulmonary effort is normal  No respiratory distress  Breath sounds: Decreased breath sounds present  Abdominal:      General: Bowel sounds are normal  There is no distension  Palpations: Abdomen is soft  Tenderness: There is no abdominal tenderness  Musculoskeletal:         General: Normal range of motion  Cervical back: Normal range of motion  Right lower leg: No edema  Left lower leg: No edema  Skin:     General: Skin is warm and dry        Capillary Refill: Capillary refill takes less than 2 seconds  Neurological:      Mental Status: She is easily aroused  Motor: Weakness (Generalized) present  Comments: Alert and oriented to person  Disoriented to place, time and situation  Forgetful   Psychiatric:         Mood and Affect: Mood normal          Speech: Speech normal          Behavior: Behavior is cooperative  Cognition and Memory: Memory is impaired  Judgment: Judgment normal           Discussion with Family: Updated  (daughter) via phone  Discharge instructions/Information to patient and family:   See after visit summary for information provided to patient and family  Provisions for Follow-Up Care:  See after visit summary for information related to follow-up care and any pertinent home health orders  Disposition:   Acute Rehab at [address] [place] [address]    Planned Readmission: No     Discharge Statement:  I spent 60 minutes discharging the patient  This time was spent on the day of discharge  I had direct contact with the patient on the day of discharge  Greater than 50% of the total time was spent examining patient, answering all patient questions, arranging and discussing plan of care with patient as well as directly providing post-discharge instructions  Additional time then spent on discharge activities  Discharge Medications:  See after visit summary for reconciled discharge medications provided to patient and/or family        **Please Note: This note may have been constructed using a voice recognition system**

## 2022-06-11 NOTE — PLAN OF CARE
Problem: Prexisting or High Potential for Compromised Skin Integrity  Goal: Skin integrity is maintained or improved  Description: INTERVENTIONS:  - Identify patients at risk for skin breakdown  - Assess and monitor skin integrity  - Assess and monitor nutrition and hydration status  - Monitor labs   - Assess for incontinence   - Turn and reposition patient  - Assist with mobility/ambulation  - Relieve pressure over bony prominences  - Avoid friction and shearing  - Provide appropriate hygiene as needed including keeping skin clean and dry  - Evaluate need for skin moisturizer/barrier cream  - Collaborate with interdisciplinary team   - Patient/family teaching  - Consider wound care consult   6/10/2022 2054 by Leydi Ortega, RN  Outcome: Adequate for Discharge  6/10/2022 2053 by Leydi Ortega, RN  Outcome: Adequate for Discharge

## 2022-06-13 NOTE — ASSESSMENT & PLAN NOTE
Covid test positive 6/7/2022  Mild pathway, no treatment provided  Continue supportive care  Encourage hydration  PT/OT for deconditioning

## 2022-06-13 NOTE — PROGRESS NOTES
[facility]  [address] [facility] [address] [facility], [address] [place] [address] 31  History and Physical    NAME: Ronaldo Rosales  AGE: 80 y o  SEX: female [id number]    DATE OF ENCOUNTER: 6/13/2022    Code status:  No CPR    Assessment and Plan     Problem List Items Addressed This Visit        Respiratory    Asthma     Stable, no acute attack  Continue Wixela 1 puff twice daily and albuterol inh prn              Cardiovascular and Mediastinum    Essential hypertension     BP stable on the lower side  Continue to monitor BP off antihypertensive meds  Avoid hypotension  Nervous and Auditory    Alzheimer's disease, early onset [facility])     Continue supportive care  Other    COVID-19 - Primary     Covid test positive 6/7/2022  Mild pathway, no treatment provided  Continue supportive care  Encourage hydration  PT/OT for deconditioning  Elevated troponin     Not MI related  Continue ASA and statin as per cardiology recommendations  All medications and routine orders were reviewed and updated as needed  Plan discussed with: staff    Chief Complaint     Seen for admission at [address] [place] [address]    History of Present Illness     79 yo female with PMHx of bronchial asthma, HTN, HLD, alzheimer's dementia who was hospitalization at [address] [place] [address] from 6/7 to 6/10 for Covid infection  Patient did not received any Covid-19 treatment due to Sx had passed 7 days  D-dimer was noted elevated, but she had no acute respiratory distress and low suscipion of PE  Cardiology was consulted for elevated troponins, but no interventions provided due to unclear etiology of new changes and advanced age  Patient was placed on ASA and statin  Patient was discharged to [address] [place] [address] for rehab  Today, she has no complaints  She denies HA, CP, SOB, abdominal pain       HISTORY:  Past Medical History:   Diagnosis Date    Arthritis     Asthma     Cancer ([facility] )     Chondrocalcinosis     Chronic sciatica     Dementia (HCC)     Diverticulosis     GERD (gastroesophageal reflux disease)     Gout     High blood pressure     History of low potassium     Hyperlipidemia     Insomnia     Iron deficiency anemia     Low vitamin D level     Myocardial infarction (HCC)     Neuropathy     Stage 3a chronic kidney disease ([facility] ) 2021    Uterine cancer ([facility] )      Family History   Problem Relation Age of Onset    Cancer Sister     Stroke Sister     Heart disease Brother      Social History     Socioeconomic History    Marital status:      Spouse name: None    Number of children: None    Years of education: None    Highest education level: None   Occupational History    Occupation: Retired    Tobacco Use    Smoking status: Never Smoker    Smokeless tobacco: Never Used   Vaping Use    Vaping Use: Never used   Substance and Sexual Activity    Alcohol use: No    Drug use: No    Sexual activity: None   Other Topics Concern    None   Social History Narrative    Most recent tobacco use screenin2020    Do you currently or have you served in the [employer]: No    Were you activated, into active duty, as a member of the [employer] or as a Reservist: No    Occupation: retired    Marital status:      Sexual orientation: Heterosexual    Diet: Regular    General stress level: Low    Alcohol intake: None    Caffeine intake: Occasional    Chewing tobacco: none    Illicit drugs: denies    Guns present in home: No    Seat belts used routinely: Yes    Sunscreen used routinely: Yes    Smoke alarm in home: Yes    Advance directive: Yes    Live alone or with others: with others    International travel: none    Pets: No    Sexually active: No     Social Determinants of Health     Financial Resource Strain: Not on file   Food Insecurity: No Food Insecurity    Worried About Running Out of Food in the Last Year: Never true    Deep of Food in the Last Year: Never true   Transportation Needs: No Transportation Needs    Lack of Transportation (Medical): No    Lack of Transportation (Non-Medical): No   Physical Activity: Not on file   Stress: Not on file   Social Connections: Not on file   Intimate Partner Violence: Not on file   Housing Stability: Unknown    Unable to Pay for Housing in the Last Year: No    Number of Places Lived in the Last Year: Not on file    Unstable Housing in the Last Year: No       Allergies:  No Known Allergies    Review of Systems     Review of Systems   Constitutional: Negative for appetite change  HENT: Negative for trouble swallowing  Respiratory: Negative for shortness of breath  Cardiovascular: Negative for chest pain and palpitations  Gastrointestinal: Negative for abdominal pain  Genitourinary: Negative for dysuria  Neurological: Negative for headaches  Medications and orders     All medications reviewed and updated in Nursing Home EMR  Objective     Vitals:   Vitals:    06/13/22 0925   BP: 116/64   Pulse: 60   Resp: 16   Temp: (!) 97 °F (36 1 °C)   SpO2: 97%   Weight: 57 6 kg (127 lb)       Physical Exam  Vitals and nursing note reviewed  Constitutional:       General: She is not in acute distress  HENT:      Nose: Nose normal       Mouth/Throat:      Mouth: Mucous membranes are moist    Eyes:      Conjunctiva/sclera: Conjunctivae normal    Cardiovascular:      Rate and Rhythm: Normal rate and regular rhythm  Pulses: Normal pulses  Heart sounds: No murmur heard  Pulmonary:      Breath sounds: No wheezing or rales  Abdominal:      General: Bowel sounds are normal  There is no distension  Tenderness: There is no abdominal tenderness  Musculoskeletal:      Right lower leg: No edema  Left lower leg: No edema  Comments: Ambulates with rolling walker   Skin:     Findings: No rash  Neurological:      Mental Status: She is alert        Comments: Oriented to person only   Pleasantly confused  Asked me who I am multiple times  Pertinent Laboratory/Diagnostic Studies: The following labs/studies were reviewed please see chart or hospital paperwork for details    Component      Latest Ref Rng & Units 6/10/2022   WBC      4 31 - 10 16 Thousand/uL 4 38   Red Blood Cell Count      3 81 - 5 12 Million/uL 3 63 (L)   Hemoglobin      11 5 - 15 4 g/dL 10 9 (L)   HCT      34 8 - 46 1 % 35 2   MCV      82 - 98 fL 97   MCH      26 8 - 34 3 pg 30 0   MCHC      31 4 - 37 4 g/dL 31 0 (L)   RDW      11 6 - 15 1 % 13 4   MPV      8 9 - 12 7 fL 10 7   Platelet Count      299 - 390 Thousands/uL 205   nRBC      /100 WBCs 0   Neutrophils %      43 - 75 % 67   Immat GRANS %      0 - 2 % 0   Lymphocytes Relative      14 - 44 % 25   Monocytes Relative      4 - 12 % 7   Eosinophils      0 - 6 % 1   Basophils Relative      0 - 1 % 0   Absolute Neutrophils      1 85 - 7 62 Thousands/µL 2 93   Immature Grans Absolute      0 00 - 0 20 Thousand/uL 0 01   Lymphocytes Absolute      0 60 - 4 47 Thousands/µL 1 10   Absolute Monocytes      0 17 - 1 22 Thousand/µL 0 29   Absolute Eosinophils      0 00 - 0 61 Thousand/µL 0 04   Basophils Absolute      0 00 - 0 10 Thousands/µL 0 01     Component      Latest Ref Rng & Units 6/10/2022   Sodium      135 - 147 mmol/L 140   Potassium      3 5 - 5 3 mmol/L 4 0   Chloride      96 - 108 mmol/L 107   CO2      21 - 32 mmol/L 24   Anion Gap      4 - 13 mmol/L 9   BUN      5 - 25 mg/dL 10   Creatinine      0 60 - 1 30 mg/dL 0 85   Glucose, Random      65 - 140 mg/dL 86   Calcium      8 4 - 10 2 mg/dL 8 6   eGFR      ml/min/1 73sq m 58     - Counseling Documentation: patient was counseled regarding: prognosis

## 2022-06-16 NOTE — PROGRESS NOTES
[address], [address] [place] [address], [address] [place] [address]  [phone]    NAME: Bria Bob  AGE: 80 y o  SEX: female    Progress Note    Location:   POS: 32 (SNF)    Assessment/Plan:    COVID-19  Asymptomatic  No hypoxia on RA  Continue supportive care  Nursing to monitor hydration/nutrition status    Alzheimer's disease, early onset ([facility] )  Continue to redirect/reorient as often as needed  Ae meal completion: 50% with 1 declined meal on record  Frail status  Continue enhanced diet with regular texture  Encourage oral fluids  Continue Vit B12 1000mcg daily  Start Theragran-M daily (micronutrients/ Anemia)  Continue Melatonin 3mg daily (sleep aid)  Continue to monitor sleep/hydration/mood/nutrition  Followed by RD while in STR    Stage 3a chronic kidney disease ([facility] )  Lab Results   Component Value Date    EGFR 58 06/10/2022    EGFR 52 06/09/2022    EGFR 52 06/08/2022    CREATININE 0 85 06/10/2022    CREATININE 0 94 06/09/2022    CREATININE 0 93 06/08/2022   Current renal functions (6/16/2022): Crea: 0 80/ BUN: 11/ eGFR: 68   Mildly elevated Na / Cl levels (6/16/2022)  Actively offer oral fluids as tolerated  BMP in 1 week    Essential hypertension  BP and HR checked Q shift in STR  BP range (6/10-16/2022) = 107/63 to 161/84  HR range (6/10-16/2022) = 58 to 74/min  Not on anti-HTN medication    Asthma  Not in exacerbation  Continue Wixela inhaler BID/ ProAir inhaler Q4 hours PRN    General weakness  Continue PT/OT as scheduled  Fall precation      Chief complaint / Reason for visit: FOLLOW-UP VISIT    History of Present Illness: This is a 25-year-old female patient currently admitted at [facility] (6/10/2022 to present) following discharge from acute care hospitalization [facility]) with Dx of COVID-19 virus infection, Non STEMI tropin and elevation      Patient is seen and examined today to follow up acute and chronic medical conditions as mentioned above Alzheimer's Dementia - Early onset, Asthma, HTN, CKD3A and ambulatory dysfunction with deconditioning  Patient is in bed for this visit - alert, cooperative, mildly anxious but not in distress  Patient is verbal with clear speech - repetitive train of thought (worry about family members medical condition) - distracted difficult to review direct - did not participate in mentation orientation assessment  Patient reported, " I feel horrible because I can't do anything anymore  I have a sister who is in the hospital and I think she   Now, I'm the youngest in the family  All my other family members are older than me"  Patient denies feeling depressed - unable to do depression assessment - unable to focus  Patient denies pain, chest pain, SOB, feeling sick in general  Per nursing, no acute medical concerns for this visit  Review of Systems:  Per history of present illness, all other systems reviewed and negative  HISTORY:  Medical Hx: Reviewed, unchanged  Family Hx: Reviewed, unchanged  Soc Hx: Reviewed,  unchanged    ALLERGY: Reviewed, unchanged  No Known Allergies     PHYSICAL EXAM:  Vital Signs:  T97 5F -P71 -R17 BP: 110/66 SpO2: 95% RA  Weight: 128 0 lbs (2022) <= 127 5 lbs (6/10/2022)    General: NAD  Frail stature  Head: Atraumatic  Normocephalic  Eye Exam: anicteric sclera, no discharge, PERRLA, No injection  Oral Exam: moist mucous membranes, no buccaloropharyngeal erythema, palatine tonsils WNL  Neck Exam: no anterior cervical lymphadenopathy noted, neck supple  Cardiovascular: regular rate, irregular rhythm, no murmurs, rubs, or gallops  Pulmonary: no wheeze, no rhonchi, no rales  No chest tenderness  Abdominal: soft, non-tender, nondistended, bowel sounds audible x 4 quadrants  Ave meal completion: 50% with 1 declined meal as documented  : Non distended bladder  Continent  BM on 2022  Extremities and skin: no edema noted, no rashes   Intact skin  Neurological: alert, cooperative and responsive  moving all 4 extremities symmetrically  Laboratory results / Imaging reviewed: Hard copy/ies in medical chart:    * BMP (6/16/2022):   GLUCOSE 89 mg/dL 65-99  Final         BUN 11 mg/dL 7-25  Final         CREATININE 0 80 mg/dL 0 40-1 10  Final         SODIUM 146 <= 140 (WNL: 6/10/2022) mmol/L 135-145 H Final         POTASSIUM 3 9 mmol/L 3 5-5 2  Final         CHLORIDE 115 mmol/L 100-109 H Final         CARBON DIOXIDE 26 mmol/L 23-31  Final         CALCIUM 8 4 mg/dL 8 5-10 1 L Final         ANION GAP 5  3-11  Final         eGFRcr 68  <= 58 (L: 6/10/2022)  >59  Final         eGFRcr COMMENT (Note)    Final       * CBC wo diff (6/16/2022):   HEMOGLOBIN 10 9 g/dL 11 5-14 5 L Final         HEMATOCRIT 32 7 % 35 0-43 0 L Final         WBC 3 4 thou/cmm 4 0-10 0 L Final         RBC 3 64 mill/cmm 3 70-4 70 L Final         PLATELET COUNT 914 thou/cmm 140-350  Final         MPV 9 1 fL 7 5-11 3  Final         MCV 90 fL   Final         MCH 30 0 pg 26 0-34 0  Final         MCHC 33 4 g/dL 32 0-37 0  Final         RDW 14 7 % 12 0-16 0  Final       * CBC with diff (6/10/2022):    Latest Reference Range & Units 06/10/22 06:01   WBC 4 31 - 10 16 Thousand/uL 4 38   Red Blood Cell Count 3 81 - 5 12 Million/uL 3 63 (L)   Hemoglobin 11 5 - 15 4 g/dL 10 9 (L)   HCT 34 8 - 46 1 % 35 2   MCV 82 - 98 fL 97   MCH 26 8 - 34 3 pg 30 0   MCHC 31 4 - 37 4 g/dL 31 0 (L)   RDW 11 6 - 15 1 % 13 4   Platelet Count 850 - 390 Thousands/uL 205   MPV 8 9 - 12 7 fL 10 7   nRBC /100 WBCs 0   Neutrophils % 43 - 75 % 67   Immat GRANS % 0 - 2 % 0   Lymphocytes Relative 14 - 44 % 25   Monocytes Relative 4 - 12 % 7   Eosinophils 0 - 6 % 1   Basophils Relative 0 - 1 % 0   Immature Grans Absolute 0 00 - 0 20 Thousand/uL 0 01   Absolute Neutrophils 1 85 - 7 62 Thousands/µL 2 93   Lymphocytes Absolute 0 60 - 4 47 Thousands/µL 1 10   Absolute Monocytes 0 17 - 1 22 Thousand/µL 0 29   Absolute Eosinophils 0 00 - 0 61 Thousand/µL 0 04   Basophils Absolute 0 00 - 0 10 Thousands/µL 0 01 * Viral Panel (6/7/2022): WNL except:  SARS-COV2: Positive    * CXR (6/7/2022): No acute cardiopulmonary symptoms      Current Medications: All medications reviewed and updated in Nursing Home Chart    Please note:  Voice-recognition software may have been used in the preparation of this document  Occasional wrong word or "sound-alike" substitutions may have occurred due to the inherent limitations of voice recognition software  Interpretation should be guided by context      MARIO Olmstead  6/17/2022

## 2022-06-17 NOTE — PROGRESS NOTES
[facility]  [address]  [phone]  [address] [place] [address] St: [facility]  POS: 32: SNF/Short Term Rehab      NAME: Rhonda Ibanez  AGE: 80 y o  SEX: female  DATE OF ADMISSION: Melita 10, 2022  DATE OF DISCHARGE: June 20, 2022  DISCHARGE DISPOSITION: TO HOME    Reason for admission: Patient was admitted from [facility] for rehabilitation after hospitalization for ambulatory dysfunction and deconditioning  This is a 17-year-old female patient currently admitted at [facility] (6/10/2022 to present) following discharge from acute care hospitalization [facility]  [facility]) with Dx of COVID-19 virus infection, Non STEMI tropin and elevation  Admission Diagnoses: COVID-19 virus infection  Discharge Diagnoses:     * Ambulatory dysfunction with deconditioning  * COVID-19 virus infection  * Alzheimer's Disease - early onset  * CKD3A  * HTN  * Asthma    Course of stay: Patient was admitted to [address] [place] [address] ([facility] ) for rehabilitation due to ambulatory dysfunction and deconditioning  Patient received 24/7 SNF supportive care, PT/OT/ST services  To date, patient stay in [facility] has been uneventful  This provider made aware by SW today that patient's last cover date for therapy is on 6/19/2022 and patient will discharge to home with HHS/VNA services on June 20, 2022 @ [address] [place] [address] and Spoke to [employer](Cabeo) (Daughter) by phone on this visit - updated on patient findings and lab results on this visit  Per POA, will need e-script for Melatonin and Theragran-M only for home medication      PT Progress Note: Per PT/ OT / ST progress note, patient is:     PT Progress note:  * Bed Mobility:  - Roll left and right = Independent  - Sit to lying = Independent  - Lying to sitting on side of bed = Independent    * Transfers:  - Sit to stand = Independent  - Chair/bed-to-chair transfer = Setup or clean-up assistance  - Toilet transfer = Supervision or touching assistance  - Toilet transfer - Sup  T detail = Supervision  - Car transfer = Supervision or touching assistance  - Car transfer - Sup  T detail = Touching Assistance     * Ambulation:  - Walk 10 feet = Supervision or touching assistance  - Walk 10 feet - Sup  T detail = Supervision  - Walk 50 feet with Two Turns = Supervision or touching assistance  - Walk 50 feet with Two Turns - Sup  T detail = Touching Assistance  - Walk 150 feet = Not attempted due to medical conditions or safety concerns  - Walking 10 feet on uneven surfaces = Not attempted due to medical conditions or safety concerns    * Stairs/Curbs:  - 1 step (curb) = Supervision or touching assistance  - 1 step (curb) - Sup  T detail = Touching Assistance  - 4 steps = Supervision or touching assistance  - 4 steps - Sup  T detail = Touching Assistance  - 12 steps = Not attempted due to medical conditions or safety concerns    * W/C Mobility:  - Resident uses a wheelchair and/or scooter? = No    * Other Picking up object = Supervision or touching assistance  - Picking up object - Sup  T detail = Supervision    Mobility Performance Raw Score (ranges from 15 - 90; 90 being the highest function) = 60    Assessment and Summary of Skilled Services Skilled Interventions Provided: Skilled treatment interventions focused on education and training patient and caregivers in Energy conservation techniques, Positioning maneuvers, Proper body mechanics, Safe transfer techniques, Safety precautions, Use of assistive device(s) and Compensatory strategies in order to decrease risk for fall and improve mobility  Patient Progress & Response to Treatment: Patient has reached maximum potential with skilled services  Currently, demonstrates independence with bed mobility, set up with transfer, CGA with ambulation using FWW x ~ 50 ft  and in ascending/descending steps x 5 using AD for support  TUG test score improves to 28 sec  which still shows risk for falls based on the cut off score of 13 seconds   Requires constant cues for proper use of the AD during ambulation for safety  Presents with confusion and needs constant cues for redirection  Plan to return home with daughter  Barrier for discharge is patient cognitive deficits  Team Communication/Collaboration: Consultation with therapists to facilitate patient's highest level of functional independence  Pain:  - Can the patient Communicate pain? = Yes  - Test / Balance / Gait Timed Up and Go = 28 seconds  - Pain Intensity = 0/10  - Frequency = Daily  - Location: Abdominal Management  - What relieves pain? = Remaining at rest  - What exacerbates pain? = Movement    Discharge Recommendations and Status  Recommendations: Home health services   Target Heart Rate Target Heart Rate Range: 99 bpm to 114 bpm    Therapy Follow Up Established/Trained = Other [facility] (Home therapy recommended)    Prognosis to Maintain CLOF = Good with consistent staff follow-through,Good with strong family support      OT Progress note:  * Eating = Setup or clean-up assistance    * Hygiene:  - Oral hygiene = Setup or clean-up assistance  - Toileting hygiene = Supervision or touching assistance  - Toileting hygiene - Sup  T detail = Supervision    * Transfers:  - Toilet transfer = Setup or clean-up assistance    * Bathing:  - Shower/bathe self = Supervision or touching assistance  - Shower/bathe self - Sup  T detail = Supervision    * Dressing:  - Upper body dressing = Setup or clean-up assistance  - Lower body dressing = Setup or clean-up assistance  - Putting on/taking off footwear = Setup or clean-up assistance    Self Care Performance Raw Score (ranges from 7 - 42; 42 being the highest function) = 33    Assessment and Summary of Skilled Services Skilled Interventions Provided: Skilled treatment interventions have included instructing and training patient in Safe transfer techniques, Energy conservation techniques, Safety precautions and Use of assistive device(s) in order to to facilitate maximum practicable functional independence, for patient to need least amount of caregiver assistance with BADLs and mobility, at home with daughter  Therapeutic activities with patient have focused on safety awareness and mobility task performances with and without RW, to promote environmental awareness for safe mobility in the home with reduced fall risk  Patient Progress & Response to Treatment: Patient has reached maximum potential with skilled services functional abilities have progressed as a result of skilled interventions and cuing  Patient demonstrates functional independence with BADLs and mobility, with supervision recommended for safety and guidance, due to patients advancing dementia  Barthel Index score has improved significantly from 35/100 at time of OT evaluation to 70/100  Team Communication/Collaboration: Consultation with therapists to facilitate patient's highest level of functional independence, Correspondence with primary caregivers to facilitate development and follow-through of patient's plan of treatment, Discussed equipment needs in prep for next level of care, Functional skills reviewed with team members and Initiated patient discharge/transition planning with team  Patient Goals Patient Remarks/Goals: My butt hurts  Pain:  - Can the patient Communicate pain? = Yes  - BCAT® Results Brief Cognitive Assessment Tool - Short Form (BCAT®-SF) = N/A - Not Applicable  - Pain Intensity = 5/10  -  Frequency = Intermittent  -  Location: Buttocks  - Pain Description/Type: Ache/sore    Discharge Recommendations and Statu Recommendations: Assistance with IADLs, Assistive device for safe functional mobility, Home health services and 24 hour care   Target Heart Rate Target Heart Rate Range: WFL    Therapy Follow Up Established/Trained = Not Indicated at This Time Prognosis Prognosis to Maintain CLOF = Good with strong family support    Occupational Therapy D/C Reason: Achieved Highest Practicable Level      ST Progress note:  D/C Location Discharge Location = Patient discharged to live with family member, friends, or others Assistance/Support to be Provided = AM assistance/caregiver available,PM assistance/caregiver available     Skilled Interventions Provided: Skilled interventions included LTM to improve reminiscing of family members  Visual reference to improve LTM  Visuals to improve orientation and safety  Visuals to improve communication via telephone with daughter  Spaced retrieval and repetition to improve STM  Expressive language assessed with prompts to determine wants and needs with ADLS  Patient Progress & Response to Treatment: Pt has declined cognitively while being here  Lack of routine and familiarity has exhibited regression with patient  Visual effective only when cues to reference  Pt STM is severe , she requires consistent repetition when looking for an object, or following a direction  Pt does not initiate tasks, without assistance  Team Communication/Collaboration: Correspondence with primary caregivers to facilitate development and followthrough of patient's plan of treatment       Objective Tests - Results and Interpretation:  * Cognition Pragmatic Skills = Within Functional Limits    * Problem Solving = Impaired  - How often does patient demonstrate adequate cog/com skills to complete routine/simple living tasks? = 50-75% of the time  - How often does patient demonstrate adequate cog/com skills to complete age-appropriate common daily living tasks? = 50-75% of the time  - How often does patient demonstrate adequate cog/com skills to complete ageappropriate complex living tasks? = 0-25% of the time  - How often does patient function safely without additional assistance/supervision due to cognitive deficits? = 0-25% of the time    * Memory = Impaired  - Short Term Memory = 0%  - Long Term Memory = < 25%  - Procedural Memory = 50%    * Executive Function = Impaired:  - Oriented To = Orientation x 0  - Alternating Attention = < 25%  - Divided Attention = < 25%  - Verbal Sequencing = 0%  - Thought Organization = < 25%  - Calculations = 0%    * Insight = Impaired:    BCAT® Results Brief Cognitive Assessment Tool (BCAT®) = N/A - Not Applicable    Discharge Recommendations and Status Recommendations: It is recommended that resident have 24/7 care , all IADLs and assist with ADLs due to thoroughness of hygiene      Therapy Follow Up Established/Trained = Not Indicated at This Time    Prognosis to Maintain CLOF = Excellent with strong family support    [facility]'s National Outcomes Measurement System (NOMS) Functional Communication Measures: Memory = 3/7; Projected Goal = 6/7 Spoken Language Expression = 5/7; Projected Goal = 6/7      Labs and testing performed during stay: Most recent lab results as below:     * BMP (6/16/2022):   GLUCOSE 89 mg/dL 65-99  Final         BUN 11 mg/dL 7-25  Final         CREATININE 0 80 mg/dL 0 40-1 10  Final         SODIUM 146 mmol/L 135-145 H Final         POTASSIUM 3 9 mmol/L 3 5-5 2  Final         CHLORIDE 115 mmol/L 100-109 H Final         CARBON DIOXIDE 26 mmol/L 23-31  Final         CALCIUM 8 4 mg/dL 8 5-10 1 L Final         ANION GAP 5  3-11  Final         eGFRcr 68  >59  Final         eGFRcr COMMENT (Note)    Final       * CBC wo diff (6/16/2022):           HEMOGLOBIN 10 9 g/dL 11 5-14 5 L Final         HEMATOCRIT 32 7 % 35 0-43 0 L Final         WBC 3 4 thou/cmm 4 0-10 0 L Final         RBC 3 64 mill/cmm 3 70-4 70 L Final         PLATELET COUNT 915 thou/cmm 140-350  Final         MPV 9 1 fL 7 5-11 3  Final         MCV 90 fL   Final         MCH 30 0 pg 26 0-34 0  Final         MCHC 33 4 g/dL 32 0-37 0  Final         RDW 14 7 % 12 0-16 0  Final               * CBC with diff (6/10/2022):    Latest Reference Range & Units 06/10/22 06:01   WBC 4 31 - 10 16 Thousand/uL 4 38   Red Blood Cell Count 3 81 - 5 12 Million/uL 3 63 (L)   Hemoglobin 11 5 - 15 4 g/dL 10 9 (L)   HCT 34 8 - 46 1 % 35 2   MCV 82 - 98 fL 97   MCH 26 8 - 34 3 pg 30 0   MCHC 31 4 - 37 4 g/dL 31 0 (L)   RDW 11 6 - 15 1 % 13 4   Platelet Count 110 - 390 Thousands/uL 205   MPV 8 9 - 12 7 fL 10 7   nRBC /100 WBCs 0   Neutrophils % 43 - 75 % 67   Immat GRANS % 0 - 2 % 0   Lymphocytes Relative 14 - 44 % 25   Monocytes Relative 4 - 12 % 7   Eosinophils 0 - 6 % 1   Basophils Relative 0 - 1 % 0   Immature Grans Absolute 0 00 - 0 20 Thousand/uL 0 01   Absolute Neutrophils 1 85 - 7 62 Thousands/µL 2 93   Lymphocytes Absolute 0 60 - 4 47 Thousands/µL 1 10   Absolute Monocytes 0 17 - 1 22 Thousand/µL 0 29   Absolute Eosinophils 0 00 - 0 61 Thousand/µL 0 04   Basophils Absolute 0 00 - 0 10 Thousands/µL 0 01         * Viral Panel (6/7/2022): WNL except:  SARS-COV2: Positive     * CXR (6/7/2022): No acute cardiopulmonary symptoms    * 2D Echo (6/8/2022): Findings:   - Left Ventricle: Left ventricular cavity size is normal  The left ventricular ejection fraction is 60%  Systolic function is normal   Basal anterolateral and basal inferolateral hypokinesis  The basal inferior wall segment was not well-visualized  Wall motion otherwise appears normal within the limitations of this study   - Right Ventricle: Right ventricular cavity size is normal  Systolic function is normal   - LA/ RA: The atrium is normal in size  - Aortic Valve/ Pulmonic Valve: The aortic valve was not assessed  Mitral Valve: There is mild annular calcification  There is mild regurgitation   - Tricuspid Valve: Tricuspid valve structure is normal  There is trace regurgitation   - IVC/SVC: The inferior vena cava is normal in size  Respirophasic changes were blunted (less than 50% variation)  HISTORY:  Medical Hx: Reviewed, unchanged  Family Hx: Reviewed, unchanged  Soc Hx: Reviewed,  unchanged    ALLERGY: Reviewed, unchanged   No Known Allergies    Discharge Medications: See discharge medication list which was reviewed and signed  Status at time of discharge: Stable    Today's Visit: June 17, 2022    Subjective:" I'm alright"    Vitals:  T97 2F -P71 -R17 BP: 136/62 SpO2: 96% RA  Weight:  128 0 lbs (6/14/2022) <= 127 5 lbs (6/10/2022)    Review of Systems   Unable to perform ROS: Dementia (Limited ROS due to cognitive impairment )   Constitutional: Negative  Negative for fever  HENT: Negative  Respiratory: Negative  Cardiovascular: Negative  Gastrointestinal: Negative  Genitourinary: Negative  Musculoskeletal: Negative  Denies pain   Neurological: Negative  Psychiatric/Behavioral: Negative  All other systems reviewed and are negative  Exam: Physical Exam  Vitals and nursing note reviewed  Constitutional:       General: She is not in acute distress  Appearance: Normal appearance  She is not ill-appearing, toxic-appearing or diaphoretic  Comments: Frail stature  HENT:      Head: Normocephalic and atraumatic  Right Ear: Tympanic membrane, ear canal and external ear normal  There is no impacted cerumen  Left Ear: Tympanic membrane, ear canal and external ear normal  There is no impacted cerumen  Ears:      Comments: Sensitive to loud voice  Nose: Nose normal  No congestion or rhinorrhea  Mouth/Throat:      Mouth: Mucous membranes are moist       Pharynx: Oropharynx is clear  No oropharyngeal exudate or posterior oropharyngeal erythema  Eyes:      General: No scleral icterus  Right eye: No discharge  Left eye: No discharge  Conjunctiva/sclera: Conjunctivae normal       Pupils: Pupils are equal, round, and reactive to light  Neck:      Vascular: No carotid bruit  Cardiovascular:      Rate and Rhythm: Normal rate  Rhythm irregular  Pulses: Normal pulses  Heart sounds: Normal heart sounds  No murmur heard  No friction rub  No gallop  Pulmonary:      Effort: Pulmonary effort is normal  No respiratory distress        Breath sounds: Normal breath sounds  No stridor  No wheezing, rhonchi or rales  Chest:      Chest wall: No tenderness  Abdominal:      General: Abdomen is flat  Bowel sounds are normal  There is no distension  Palpations: Abdomen is soft  There is no mass  Tenderness: There is no abdominal tenderness  There is no right CVA tenderness, left CVA tenderness, guarding or rebound  Hernia: No hernia is present  Comments: Ave meal completion: 25-50%   Genitourinary:     Comments: Non distended bladder  Incontinent of urine 3-4x per day  Last documented BM on 6/17/2022  Musculoskeletal:         General: No swelling, tenderness, deformity or signs of injury  Cervical back: Normal range of motion and neck supple  No rigidity or tenderness  Right lower leg: No edema  Left lower leg: No edema  Comments: Ambulatory dysfunction  Lymphadenopathy:      Cervical: No cervical adenopathy  Skin:     General: Skin is warm  Capillary Refill: Capillary refill takes less than 2 seconds  Coloration: Skin is not jaundiced or pale  Findings: No bruising, erythema, lesion or rash  Comments: Intact skin  No DTI on bony prominences   Neurological:      General: No focal deficit present  Mental Status: She is alert  Mental status is at baseline  Comments: Verbal with clear speech - oriented to name and birthday only  Psychiatric:         Mood and Affect: Mood normal          Behavior: Behavior normal       Comments: Alert, cooperative, calm and not in distress  Patient is more engaged verbally, improved focus but still exhibited intermittent confusion and difficulty in memory recall - requests to give her time to remember  Spoke with daughter Renita Ruano) today - at baseline prior to hospital admission         Discussion with patient/family and further instructions:  -Fall precautions  -Aspiration precautions  -Bleeding precautions  -Monitor for signs/symptoms of infection  -Medication list was reviewed and signed  -DME deemed not needed at this time  Follow-up Recommendations:     * Please follow-up with your primary care physician within 7-10 days of discharge to review medication changes and current status  The family needs to set-up an appointment for BRANDON PARKER with PCP (Dr Hiwot Uribe: [phone]: Fax: [phone]) upon discharge to home  Please make every effort to attend this appointment  Should there be a change in your medical condition and an earlier appointment is needed please call the number provided above  * Recommend PT/OT service to continue strengthening, gait, balance and overall functional independence improvement gained during in-patient rehabilitation  * Recommend VNA to assist in medication management and other skilled nursing needs during transition to home  Problem List Follow-up Recommendations:    COVID-19  COVID-19 PCR test (6/7/2022): Positive  Presented to [facility] with fever/generalized weakness/ decreased appetite and cough x 1 week  CXR (6/7/2022): No acute cardiopulmonary disease process  Per hospital record did not receive Steroids/Remdesivir or Baricitinib in hospital - not candidate  Clinically recovered at this time  Still with mild fatigue  Improved mentation/alertness/ engagement and orientation on this visit  Asymptomatic  No hypoxia on RA  LCTA  Continue supportive care - monitor hydration/nutrition status at home  May resume regular room and off room isolation/droplet precaution     Alzheimer's disease, early onset ([facility] [address] )  Continue to redirect/reorient as often as needed  Ave meal completion: 50% with 1 declined meal documented  Per POA, patient with poor and irregular meal completion at home  Frail stature    Continue enhanced diet with regular texture  Encourage oral fluids  Continue Vit B12 1000mcg daily/ Theragran-M daily (micronutrients/ Anemia)  Continue Melatonin 3mg daily (sleep aid)  Continue to monitor sleep/hydration/mood/nutrition  Followed by RD while in STR     Stage 3a chronic kidney disease [facility])  Lab Results  Component Value Date    EGFR 58 06/10/2022    EGFR 52 06/09/2022    EGFR 52 06/08/2022    CREATININE 0 85 06/10/2022    CREATININE 0 94 06/09/2022    CREATININE 0 93 06/08/2022  Improved renal functions (6/16/2022): Crea: 0 80/ BUN: 11/ eGFR: 68   Mildly elevated Na / Cl levels (6/16/2022): 146 / 115  Actively offer oral fluids as tolerated  To follow-up with PCP upon discharge     Essential hypertension  Stable  BP and HR checked Q shift in STR  BP range (6/10-17/2022) = 107/63 to 161/84  ** 2 episodes of SBP > 150 on this visit  HR range (6/10-17/2022) = 58 to 74/min  Not on anti-HTN medication     Asthma  Not in exacerbation  Continue Wixela inhaler BID/ ProAir inhaler Q4 hours PRN     General weakness  Continue PT/OT  Fall precaution    Elevated Troponin  In-patient Cardiology done - deemed non cardiac  2D Echo (6/8/2022): Abnormal  Patient denies chest pain or SOB  Continue ASA/ Statin therapy      CURRENT MEDICATION LIST IN OO-STR:    Current Outpatient Medications:     acetaminophen (TYLENOL) 325 mg tablet, Take 2 tablets (650 mg total) by mouth every 6 (six) hours as needed for mild pain, fever or headaches, Disp: , Rfl: 0    aspirin (ECOTRIN LOW STRENGTH) 81 mg EC tablet, Take 1 tablet (81 mg total) by mouth daily, Disp: , Rfl: 0    cyanocobalamin (VITAMIN B-12) 1,000 mcg tablet, Take 1,000 mcg by mouth daily  , Disp: , Rfl:     fluticasone-salmeterol (Advair) 100-50 mcg/dose inhaler, Inhale 1 puff 2 (two) times a day Rinse mouth after use , Disp: , Rfl:     gabapentin (NEURONTIN) 100 mg capsule, Take 100 mg by mouth 3 (three) times a day, Disp: , Rfl:     melatonin 3 mg, Take 1 tablet (3 mg total) by mouth daily at bedtime, Disp: , Rfl: 0    multivitamin-iron-minerals-folic acid (THERAPEUTIC-M) TABS tablet, Take 1 tablet by mouth daily, Disp: , Rfl:     pravastatin (PRAVACHOL) 10 mg tablet, TAKE ONE TABLET BY MOUTH EVERY DAY, Disp: 90 tablet, Rfl: 0    ProAir  (90 Base) MCG/ACT inhaler, INHALE TWO PUFFS BY MOUTH EVERY 4 HOURS AS NEEDED, Disp: 25 5 g, Rfl: 1      Discharge Statement:  * Spent more than 30 minutes discharging the patient; greater than 50% spent on physical examination of patient, answering questions, discussion of plan of care, recommendations and providing post discharge instructions  Additional time spend on other discharge related activities including documentation        [address] [place] [address]  6/18/202211:04 AM

## 2022-06-17 NOTE — LETTER
June 19, 2022     Mary Olivarez MD  [address]  [address]  [address] [place] [address]    Patient: Trae Chaparro   YOB: 1927   Date of Visit: 6/17/2022       Dear Dr Rita Chambers:    Good evening! Sending to you the discharge note for  Trae Chaparro  Patient is scheduled for discharge back to home on June 20, 2022 with HHS/VNA services after completing rehabilitation services at [facility])  Family still needs to set up appointment for her transition of care visit with you  If you have questions, please do not hesitate to call me  Sincerely,        MARIO Walter        CC: No Recipients  Eunice Ryan  6/19/2022  7:46 PM  Sign when Signing Visit  [facility]  Laura Yg [address]  [phone] 32: [facility] [place]  POS: 32: SNF/Short Term Rehab      NAME: Trae Chaparro  AGE: 80 y o  SEX: female  DATE OF ADMISSION: Melita 10, 2022  DATE OF DISCHARGE: June 20, 2022  DISCHARGE DISPOSITION: TO HOME    Reason for admission: Patient was admitted from [facility] for rehabilitation after hospitalization for ambulatory dysfunction and deconditioning  This is a 80-year-old female patient currently admitted at [facility] (6/10/2022 to present) following discharge from acute care hospitalization [facility]  [facility]) with Dx of COVID-19 virus infection, Non STEMI tropin and elevation  Admission Diagnoses: COVID-19 virus infection  Discharge Diagnoses:     * Ambulatory dysfunction with deconditioning  * COVID-19 virus infection  * Alzheimer's Disease - early onset  * CKD3A  * HTN  * Asthma    Course of stay: Patient was admitted to [address] [place] [address]) for rehabilitation due to ambulatory dysfunction and deconditioning  Patient received 24/7 SNF supportive care, PT/OT/ST services  To date, patient stay in [facility] has been uneventful   This provider made aware by SW today that patient's last cover date for therapy is on 6/19/2022 and patient will discharge to home with [facility]VNA services on June 20, 2022 @ [facility] and Spoke to Dima (Daughter) by phone on this visit - updated on patient findings and lab results on this visit  Per POA, will need e-script for Melatonin and Theragran-M only for home medication      PT Progress Note: Per PT/ OT / ST progress note, patient is:     PT Progress note:  * Bed Mobility:  - Roll left and right = Independent  - Sit to lying = Independent  - Lying to sitting on side of bed = Independent    * Transfers:  - Sit to stand = Independent  - Chair/bed-to-chair transfer = Setup or clean-up assistance  - Toilet transfer = Supervision or touching assistance  - Toilet transfer - Sup  T detail = Supervision  - Car transfer = Supervision or touching assistance  - Car transfer - Sup  T detail = Touching Assistance     * Ambulation:  - Walk 10 feet = Supervision or touching assistance  - Walk 10 feet - Sup  T detail = Supervision  - Walk 50 feet with Two Turns = Supervision or touching assistance  - Walk 50 feet with Two Turns - Sup  T detail = Touching Assistance  - Walk 150 feet = Not attempted due to medical conditions or safety concerns  - Walking 10 feet on uneven surfaces = Not attempted due to medical conditions or safety concerns    * Stairs/Curbs:  - 1 step (curb) = Supervision or touching assistance  - 1 step (curb) - Sup  T detail = Touching Assistance  - 4 steps = Supervision or touching assistance  - 4 steps - Sup  T detail = Touching Assistance  - 12 steps = Not attempted due to medical conditions or safety concerns    * W/C Mobility:  - Resident uses a wheelchair and/or scooter? = No    * Other Picking up object = Supervision or touching assistance  - Picking up object - Sup  T detail = Supervision    Mobility Performance Raw Score (ranges from 15 - 90; 90 being the highest function) = 60    Assessment and Summary of Skilled Services Skilled Interventions Provided: Skilled treatment interventions focused on education and training patient and caregivers in Energy conservation techniques, Positioning maneuvers, Proper body mechanics, Safe transfer techniques, Safety precautions, Use of assistive device(s) and Compensatory strategies in order to decrease risk for fall and improve mobility  Patient Progress & Response to Treatment: Patient has reached maximum potential with skilled services  Currently, demonstrates independence with bed mobility, set up with transfer, CGA with ambulation using FWW x ~ 50 ft  and in ascending/descending steps x 5 using AD for support  TUG test score improves to 28 sec  which still shows risk for falls based on the cut off score of 13 seconds  Requires constant cues for proper use of the AD during ambulation for safety  Presents with confusion and needs constant cues for redirection  Plan to return home with daughter  Barrier for discharge is patient cognitive deficits  Team Communication/Collaboration: Consultation with therapists to facilitate patient's highest level of functional independence  Pain:  - Can the patient Communicate pain? = Yes  - Test / Balance / Gait Timed Up and Go = 28 seconds  - Pain Intensity = 0/10  - Frequency = Daily  - Location: Abdominal Management  - What relieves pain? = Remaining at rest  - What exacerbates pain? = Movement    Discharge Recommendations and Status  Recommendations: Home health services   Target Heart Rate Target Heart Rate Range: 99 bpm to 114 bpm    Therapy Follow Up Established/Trained = Other [facility] (Home therapy recommended)    Prognosis to Maintain CLOF = Good with consistent staff follow-through,Good with strong family support      OT Progress note:  * Eating = Setup or clean-up assistance    * Hygiene:  - Oral hygiene = Setup or clean-up assistance  - Toileting hygiene = Supervision or touching assistance  - Toileting hygiene - Sup  T detail = Supervision    * Transfers:  - Toilet transfer = Setup or clean-up assistance    * Bathing:  - Shower/bathe self = Supervision or touching assistance  - Shower/bathe self - Sup  T detail = Supervision    * Dressing:  - Upper body dressing = Setup or clean-up assistance  - Lower body dressing = Setup or clean-up assistance  - Putting on/taking off footwear = Setup or clean-up assistance    Self Care Performance Raw Score (ranges from 7 - 42; 42 being the highest function) = 33    Assessment and Summary of Skilled Services Skilled Interventions Provided: Skilled treatment interventions have included instructing and training patient in Safe transfer techniques, Energy conservation techniques, Safety precautions and Use of assistive device(s) in order to to facilitate maximum practicable functional independence, for patient to need least amount of caregiver assistance with BADLs and mobility, at home with daughter  Therapeutic activities with patient have focused on safety awareness and mobility task performances with and without RW, to promote environmental awareness for safe mobility in the home with reduced fall risk  Patient Progress & Response to Treatment: Patient has reached maximum potential with skilled services functional abilities have progressed as a result of skilled interventions and cuing  Patient demonstrates functional independence with BADLs and mobility, with supervision recommended for safety and guidance, due to patients advancing dementia  Barthel Index score has improved significantly from 35/100 at time of OT evaluation to 70/100      Team Communication/Collaboration: Consultation with therapists to facilitate patient's highest level of functional independence, Correspondence with primary caregivers to facilitate development and follow-through of patient's plan of treatment, Discussed equipment needs in prep for next level of care, Functional skills reviewed with team members and Initiated patient discharge/transition planning with team  Patient Goals Patient Remarks/Goals: My butt hurts  Pain:  - Can the patient Communicate pain? = Yes  - BCAT® Results Brief Cognitive Assessment Tool - Short Form (BCAT®-SF) = N/A - Not Applicable  - Pain Intensity = 5/10  -  Frequency = Intermittent  -  Location: Buttocks  - Pain Description/Type: Ache/sore    Discharge Recommendations and Statu Recommendations: Assistance with IADLs, Assistive device for safe functional mobility, Home health services and 24 hour care  Target Heart Rate Target Heart Rate Range: WFL    Therapy Follow Up Established/Trained = Not Indicated at This Time Prognosis Prognosis to Maintain CLOF = Good with strong family support    Occupational Therapy D/C Reason: Achieved Highest Practicable Level      ST Progress note:  D/C Location Discharge Location = Patient discharged to live with family member, friends, or others Assistance/Support to be Provided = AM assistance/caregiver available,PM assistance/caregiver available     Skilled Interventions Provided: Skilled interventions included LTM to improve reminiscing of family members  Visual reference to improve LTM  Visuals to improve orientation and safety  Visuals to improve communication via telephone with daughter  Spaced retrieval and repetition to improve STM  Expressive language assessed with prompts to determine wants and needs with ADLS  Patient Progress & Response to Treatment: Pt has declined cognitively while being here  Lack of routine and familiarity has exhibited regression with patient  Visual effective only when cues to reference  Pt STM is severe , she requires consistent repetition when looking for an object, or following a direction  Pt does not initiate tasks, without assistance  Team Communication/Collaboration: Correspondence with primary caregivers to facilitate development and followthrough of patient's plan of treatment       Objective Tests - Results and Interpretation:  * Cognition Pragmatic Skills = Within Functional Limits    * Problem Solving = Impaired  - How often does patient demonstrate adequate cog/com skills to complete routine/simple living tasks? = 50-75% of the time  - How often does patient demonstrate adequate cog/com skills to complete age-appropriate common daily living tasks? = 50-75% of the time  - How often does patient demonstrate adequate cog/com skills to complete ageappropriate complex living tasks? = 0-25% of the time  - How often does patient function safely without additional assistance/supervision due to cognitive deficits? = 0-25% of the time    * Memory = Impaired  - Short Term Memory = 0%  - Long Term Memory = < 25%  - Procedural Memory = 50%    * Executive Function = Impaired:  - Oriented To = Orientation x 0  - Alternating Attention = < 25%  - Divided Attention = < 25%  - Verbal Sequencing = 0%  - Thought Organization = < 25%  - Calculations = 0%    * Insight = Impaired:    BCAT® Results Brief Cognitive Assessment Tool (BCAT®) = N/A - Not Applicable    Discharge Recommendations and Status Recommendations: It is recommended that resident have 24/7 care , all IADLs and assist with ADLs due to thoroughness of hygiene      Therapy Follow Up Established/Trained = Not Indicated at This Time    Prognosis to Maintain CLOF = Excellent with strong family support    [facility]'s National Outcomes Measurement System (NOMS) Functional Communication Measures: Memory = 3/7; Projected Goal = 6/7 Spoken Language Expression = 5/7; Projected Goal = 6/7      Labs and testing performed during stay: Most recent lab results as below:     * BMP (6/16/2022):   GLUCOSE 89 mg/dL 65-99  Final         BUN 11 mg/dL 7-25  Final         CREATININE 0 80 mg/dL 0 40-1 10  Final         SODIUM 146 mmol/L 135-145 H Final         POTASSIUM 3 9 mmol/L 3 5-5 2  Final         CHLORIDE 115 mmol/L 100-109 H Final         CARBON DIOXIDE 26 mmol/L 23-31  Final         CALCIUM 8 4 mg/dL 8 5-10 1 L Final         ANION GAP 5  3-11  Final  eGFRcr 68  >59  Final         eGFRcr COMMENT (Note)    Final       * CBC wo diff (6/16/2022):           HEMOGLOBIN 10 9 g/dL 11 5-14 5 L Final         HEMATOCRIT 32 7 % 35 0-43 0 L Final         WBC 3 4 thou/cmm 4 0-10 0 L Final         RBC 3 64 mill/cmm 3 70-4 70 L Final         PLATELET COUNT 552 thou/cmm 140-350  Final         MPV 9 1 fL 7 5-11 3  Final         MCV 90 fL   Final         MCH 30 0 pg 26 0-34 0  Final         MCHC 33 4 g/dL 32 0-37 0  Final         RDW 14 7 % 12 0-16 0  Final               * CBC with diff (6/10/2022):    Latest Reference Range & Units 06/10/22 06:01   WBC 4 31 - 10 16 Thousand/uL 4 38   Red Blood Cell Count 3 81 - 5 12 Million/uL 3 63 (L)   Hemoglobin 11 5 - 15 4 g/dL 10 9 (L)   HCT 34 8 - 46 1 % 35 2   MCV 82 - 98 fL 97   MCH 26 8 - 34 3 pg 30 0   MCHC 31 4 - 37 4 g/dL 31 0 (L)   RDW 11 6 - 15 1 % 13 4   Platelet Count 241 - 390 Thousands/uL 205   MPV 8 9 - 12 7 fL 10 7   nRBC /100 WBCs 0   Neutrophils % 43 - 75 % 67   Immat GRANS % 0 - 2 % 0   Lymphocytes Relative 14 - 44 % 25   Monocytes Relative 4 - 12 % 7   Eosinophils 0 - 6 % 1   Basophils Relative 0 - 1 % 0   Immature Grans Absolute 0 00 - 0 20 Thousand/uL 0 01   Absolute Neutrophils 1 85 - 7 62 Thousands/µL 2 93   Lymphocytes Absolute 0 60 - 4 47 Thousands/µL 1 10   Absolute Monocytes 0 17 - 1 22 Thousand/µL 0 29   Absolute Eosinophils 0 00 - 0 61 Thousand/µL 0 04   Basophils Absolute 0 00 - 0 10 Thousands/µL 0 01         * Viral Panel (6/7/2022): WNL except:  SARS-COV2: Positive     * CXR (6/7/2022): No acute cardiopulmonary symptoms    * 2D Echo (6/8/2022): Findings:   - Left Ventricle: Left ventricular cavity size is normal  The left ventricular ejection fraction is 60%  Systolic function is normal   Basal anterolateral and basal inferolateral hypokinesis  The basal inferior wall segment was not well-visualized   Wall motion otherwise appears normal within the limitations of this study   - Right Ventricle: Right ventricular cavity size is normal  Systolic function is normal   - LA/ RA: The atrium is normal in size  - Aortic Valve/ Pulmonic Valve: The aortic valve was not assessed  Mitral Valve: There is mild annular calcification  There is mild regurgitation   - Tricuspid Valve: Tricuspid valve structure is normal  There is trace regurgitation   - IVC/SVC: The inferior vena cava is normal in size  Respirophasic changes were blunted (less than 50% variation)  HISTORY:  Medical Hx: Reviewed, unchanged  Family Hx: Reviewed, unchanged  Soc Hx: Reviewed,  unchanged    ALLERGY: Reviewed, unchanged  No Known Allergies    Discharge Medications: See discharge medication list which was reviewed and signed  Status at time of discharge: Stable    Today's Visit: June 17, 2022    Subjective:" I'm alright"    Vitals:  T97 2F -P71 -R17 BP: 136/62 SpO2: 96% RA  Weight:  128 0 lbs (6/14/2022) <= 127 5 lbs (6/10/2022)    Review of Systems   Unable to perform ROS: Dementia (Limited ROS due to cognitive impairment )   Constitutional: Negative  Negative for fever  HENT: Negative  Respiratory: Negative  Cardiovascular: Negative  Gastrointestinal: Negative  Genitourinary: Negative  Musculoskeletal: Negative  Denies pain   Neurological: Negative  Psychiatric/Behavioral: Negative  All other systems reviewed and are negative  Exam: Physical Exam  Vitals and nursing note reviewed  Constitutional:       General: She is not in acute distress  Appearance: Normal appearance  She is not ill-appearing, toxic-appearing or diaphoretic  Comments: Frail stature  HENT:      Head: Normocephalic and atraumatic  Right Ear: Tympanic membrane, ear canal and external ear normal  There is no impacted cerumen  Left Ear: Tympanic membrane, ear canal and external ear normal  There is no impacted cerumen  Ears:      Comments: Sensitive to loud voice       Nose: Nose normal  No congestion or rhinorrhea  Mouth/Throat:      Mouth: Mucous membranes are moist       Pharynx: Oropharynx is clear  No oropharyngeal exudate or posterior oropharyngeal erythema  Eyes:      General: No scleral icterus  Right eye: No discharge  Left eye: No discharge  Conjunctiva/sclera: Conjunctivae normal       Pupils: Pupils are equal, round, and reactive to light  Neck:      Vascular: No carotid bruit  Cardiovascular:      Rate and Rhythm: Normal rate  Rhythm irregular  Pulses: Normal pulses  Heart sounds: Normal heart sounds  No murmur heard  No friction rub  No gallop  Pulmonary:      Effort: Pulmonary effort is normal  No respiratory distress  Breath sounds: Normal breath sounds  No stridor  No wheezing, rhonchi or rales  Chest:      Chest wall: No tenderness  Abdominal:      General: Abdomen is flat  Bowel sounds are normal  There is no distension  Palpations: Abdomen is soft  There is no mass  Tenderness: There is no abdominal tenderness  There is no right CVA tenderness, left CVA tenderness, guarding or rebound  Hernia: No hernia is present  Comments: Ave meal completion: 25-50%   Genitourinary:     Comments: Non distended bladder  Incontinent of urine 3-4x per day  Last documented BM on 6/17/2022  Musculoskeletal:         General: No swelling, tenderness, deformity or signs of injury  Cervical back: Normal range of motion and neck supple  No rigidity or tenderness  Right lower leg: No edema  Left lower leg: No edema  Comments: Ambulatory dysfunction  Lymphadenopathy:      Cervical: No cervical adenopathy  Skin:     General: Skin is warm  Capillary Refill: Capillary refill takes less than 2 seconds  Coloration: Skin is not jaundiced or pale  Findings: No bruising, erythema, lesion or rash  Comments: Intact skin  No DTI on bony prominences   Neurological:      General: No focal deficit present  Mental Status: She is alert  Mental status is at baseline  Comments: Verbal with clear speech - oriented to name and birthday only  Psychiatric:         Mood and Affect: Mood normal          Behavior: Behavior normal       Comments: Alert, cooperative, calm and not in distress  Patient is more engaged verbally, improved focus but still exhibited intermittent confusion and difficulty in memory recall - requests to give her time to remember  Spoke with daughter Grant Sims) today - at baseline prior to hospital admission  Discussion with patient/family and further instructions:  -Fall precautions  -Aspiration precautions  -Bleeding precautions  -Monitor for signs/symptoms of infection  -Medication list was reviewed and signed  -DME deemed not needed at this time  Follow-up Recommendations:     * Please follow-up with your primary care physician within 7-10 days of discharge to review medication changes and current status  The family needs to set-up an appointment for BRANDON PARKER with PCP (Dr Jahaira Christie: [phone]: Fax: [phone]) upon discharge to home  Please make every effort to attend this appointment  Should there be a change in your medical condition and an earlier appointment is needed please call the number provided above  * Recommend PT/OT service to continue strengthening, gait, balance and overall functional independence improvement gained during in-patient rehabilitation  * Recommend VNA to assist in medication management and other skilled nursing needs during transition to home  Problem List Follow-up Recommendations:    COVID-19  COVID-19 PCR test (6/7/2022): Positive  Presented to [facility] with fever/generalized weakness/ decreased appetite and cough x 1 week  CXR (6/7/2022): No acute cardiopulmonary disease process  Per hospital record did not receive Steroids/Remdesivir or Baricitinib in hospital - not candidate  Clinically recovered at this time  Still with mild fatigue    Improved mentation/alertness/ engagement and orientation on this visit  Asymptomatic  No hypoxia on RA  LCTA  Continue supportive care - monitor hydration/nutrition status at home  May resume regular room and off room isolation/droplet precaution     Alzheimer's disease, early onset ([address] )  Continue to redirect/reorient as often as needed  Ave meal completion: 50% with 1 declined meal documented  Per POA, patient with poor and irregular meal completion at home  Frail stature  Continue enhanced diet with regular texture  Encourage oral fluids  Continue Vit B12 1000mcg daily/ Theragran-M daily (micronutrients/ Anemia)  Continue Melatonin 3mg daily (sleep aid)  Continue to monitor sleep/hydration/mood/nutrition  Followed by RD while in STR     Stage 3a chronic kidney disease [facility])  Lab Results  Component Value Date    EGFR 58 06/10/2022    EGFR 52 06/09/2022    EGFR 52 06/08/2022    CREATININE 0 85 06/10/2022    CREATININE 0 94 06/09/2022    CREATININE 0 93 06/08/2022  Improved renal functions (6/16/2022): Crea: 0 80/ BUN: 11/ eGFR: 68   Mildly elevated Na / Cl levels (6/16/2022): 146 / 115  Actively offer oral fluids as tolerated  To follow-up with PCP upon discharge     Essential hypertension  Stable  BP and HR checked Q shift in STR  BP range (6/10-17/2022) = 107/63 to 161/84  ** 2 episodes of SBP > 150 on this visit  HR range (6/10-17/2022) = 58 to 74/min  Not on anti-HTN medication     Asthma  Not in exacerbation  Continue Wixela inhaler BID/ ProAir inhaler Q4 hours PRN     General weakness  Continue PT/OT  Fall precaution    Elevated Troponin  In-patient Cardiology done - deemed non cardiac  2D Echo (6/8/2022): Abnormal  Patient denies chest pain or SOB    Continue ASA/ Statin therapy      CURRENT MEDICATION LIST IN OO-STR:    Current Outpatient Medications:     acetaminophen (TYLENOL) 325 mg tablet, Take 2 tablets (650 mg total) by mouth every 6 (six) hours as needed for mild pain, fever or headaches, Disp: , Rfl: 0    aspirin (ECOTRIN LOW STRENGTH) 81 mg EC tablet, Take 1 tablet (81 mg total) by mouth daily, Disp: , Rfl: 0    cyanocobalamin (VITAMIN B-12) 1,000 mcg tablet, Take 1,000 mcg by mouth daily  , Disp: , Rfl:     fluticasone-salmeterol (Advair) 100-50 mcg/dose inhaler, Inhale 1 puff 2 (two) times a day Rinse mouth after use , Disp: , Rfl:     gabapentin (NEURONTIN) 100 mg capsule, Take 100 mg by mouth 3 (three) times a day, Disp: , Rfl:     melatonin 3 mg, Take 1 tablet (3 mg total) by mouth daily at bedtime, Disp: , Rfl: 0    multivitamin-iron-minerals-folic acid (THERAPEUTIC-M) TABS tablet, Take 1 tablet by mouth daily, Disp: , Rfl:     pravastatin (PRAVACHOL) 10 mg tablet, TAKE ONE TABLET BY MOUTH EVERY DAY, Disp: 90 tablet, Rfl: 0    ProAir  (90 Base) MCG/ACT inhaler, INHALE TWO PUFFS BY MOUTH EVERY 4 HOURS AS NEEDED, Disp: 25 5 g, Rfl: 1      Discharge Statement:  * Spent more than 30 minutes discharging the patient; greater than 50% spent on physical examination of patient, answering questions, discussion of plan of care, recommendations and providing post discharge instructions  Additional time spend on other discharge related activities including documentation        [address] [place] [address] Postal  6/18/202211:04 AM

## 2022-06-17 NOTE — TELEPHONE ENCOUNTER
Galnia Talamantes from [facility], called to report that the patient will be discharged from [facility] on 6/20 with a referral for nursing and therapy with JENA BELLA will fax orders

## 2022-06-17 NOTE — ASSESSMENT & PLAN NOTE
Continue to redirect/reorient as often as needed  Ae meal completion: 50% with 1 declined meal on record  Frail status  Continue enhanced diet with regular texture  Encourage oral fluids  Continue Vit B12 1000mcg daily  Start Theragran-M daily (micronutrients/ Anemia)  Continue Melatonin 3mg daily (sleep aid)  Continue to monitor sleep/hydration/mood/nutrition  Followed by RD while in STR

## 2022-06-17 NOTE — ASSESSMENT & PLAN NOTE
Lab Results   Component Value Date    EGFR 58 06/10/2022    EGFR 52 06/09/2022    EGFR 52 06/08/2022    CREATININE 0 85 06/10/2022    CREATININE 0 94 06/09/2022    CREATININE 0 93 06/08/2022   Current renal functions (6/16/2022): Crea: 0 80/ BUN: 11/ eGFR: 68   Mildly elevated Na / Cl levels (6/16/2022)  Actively offer oral fluids as tolerated  BMP in 1 week

## 2022-06-17 NOTE — ASSESSMENT & PLAN NOTE
Asymptomatic   No hypoxia on RA  Continue supportive care  Nursing to monitor hydration/nutrition status

## 2022-06-17 NOTE — ASSESSMENT & PLAN NOTE
BP and HR checked Q shift in STR  BP range (6/10-16/2022) = 107/63 to 161/84  HR range (6/10-16/2022) = 58 to 74/min  Not on anti-HTN medication

## 2022-06-24 NOTE — PROGRESS NOTES
Assessment/Plan:  Its going to take time for the fatigue   covid is a strong virus   No changes at this time   Just supportive care     Cont with meds for arthritis and neuropathy   Breathing is fine lately   Albuterol prn    1  History of COVID-19    2  Alzheimer's disease, early onset ([facility] [address] )    3  Stage 3a chronic kidney disease ([facility] [address] )    4  Continuous opioid dependence ([facility] [address] )    5  Fatigue, unspecified type    6  Neuropathy       Subjective:      Patient ID: Enrique Beck is a 80 y o  female  HPI      Here for tcm     covid + with fatigue and weakness   Troponin increased but cardio didn't think much of it   Cont with ASA and statin no BB   alzheimers - here with daughter - safe       Using albuterol  For asthma      On tova and tramadol for chornic arithtis   Dementia was on aricpt   CAD on prava no ASA o BB   Neuropathy on tova   ckd 3 - is 79 yo    TCM Call (since 5/27/2022)     Date and time call was made  6/21/2022  Noemy [address] care reviewed  Records reviewed    Patient was hospitialized at  Other (comment); [facility]  Patient was in [facility] from 6/7/22-6/10/22 then transferred to [facility] until 6/20/22    Date of Admission  06/07/22    Date of discharge  06/20/22    Diagnosis  COVID-19    Disposition  Home    Were the patients medications reviewed and updated  Yes    Current Symptoms  None      TCM Call (since 5/27/2022)     Post hospital issues  None    Should patient be enrolled in anticoag monitoring? No    Scheduled for follow up?   Yes    Did you obtain your prescribed medications  Yes    Do you need help managing your prescriptions or medications  Yes    Why type of assitance do you need  daughter helps with medications    Is transportation to your appointment needed  Yes    Specify why  daughter helps with transportation    I have advised the patient to call PCP with any new or worsening symptoms  Ld Weaver MA    Living Arrangements  Children    Support System  Family    The type of support provided  None    Do you have social support  Yes, as much as I need    Are you recieving any outpatient services  No    Are you recieving home care services  No    Are you using any community resources  No    Current waiver services  No    Have you fallen in the last 12 months  No    Interperter language line needed  No    Counseling  Patient            Very fatigued     4pm till next moring       The following portions of the patient's history were reviewed and updated as appropriate: allergies, current medications, past family history, past medical history, past social history, past surgical history and problem list     Review of Systems   Constitutional: Positive for fatigue  Negative for fever and unexpected weight change  HENT: Negative for nosebleeds and trouble swallowing  Eyes: Negative for visual disturbance  Respiratory: Negative for chest tightness, shortness of breath and wheezing  Cardiovascular: Negative for chest pain, palpitations and leg swelling  Gastrointestinal: Negative for abdominal pain, constipation, diarrhea and nausea  Endocrine: Negative for cold intolerance  Genitourinary: Negative for dysuria and urgency  Musculoskeletal: Positive for arthralgias  Negative for joint swelling and myalgias  Skin: Negative for rash  Neurological: Positive for weakness  Negative for tremors, seizures and syncope  Hematological: Does not bruise/bleed easily  Psychiatric/Behavioral: Positive for confusion and decreased concentration  Negative for hallucinations and suicidal ideas  Objective:      /70 (BP Location: Left arm, Patient Position: Sitting, Cuff Size: Standard)   Pulse 89   Resp 16   Ht 5' (1 524 m)   Wt 53 5 kg (118 lb)   SpO2 98%   BMI 23 05 kg/m²     No visits with results within 2 Week(s) from this visit     Latest known visit with results is:   Admission on 06/07/2022, Discharged on 06/10/2022   Component Date Value    WBC 06/07/2022 8 13     RBC 06/07/2022 4 01     Hemoglobin 06/07/2022 12 0     Hematocrit 06/07/2022 37 3     MCV 06/07/2022 93     MCH 06/07/2022 29 9     MCHC 06/07/2022 32 2     RDW 06/07/2022 13 2     MPV 06/07/2022 10 0     Platelets 23/09/1135 266     nRBC 06/07/2022 0     Neutrophils Relative 06/07/2022 84 (A)    Immat GRANS % 06/07/2022 0     Lymphocytes Relative 06/07/2022 9 (A)    Monocytes Relative 06/07/2022 6     Eosinophils Relative 06/07/2022 1     Basophils Relative 06/07/2022 0     Neutrophils Absolute 06/07/2022 6 88     Immature Grans Absolute 06/07/2022 0 03     Lymphocytes Absolute 06/07/2022 0 69     Monocytes Absolute 06/07/2022 0 46     Eosinophils Absolute 06/07/2022 0 05     Basophils Absolute 06/07/2022 0 02     Sodium 06/07/2022 138     Potassium 06/07/2022 4 2     Chloride 06/07/2022 102     CO2 06/07/2022 24     ANION GAP 06/07/2022 12     BUN 06/07/2022 15     Creatinine 06/07/2022 0 93     Glucose 06/07/2022 96     Calcium 06/07/2022 9 3     AST 06/07/2022 17     ALT 06/07/2022 12     Alkaline Phosphatase 06/07/2022 65     Total Protein 06/07/2022 6 9     Albumin 06/07/2022 3 7     Total Bilirubin 06/07/2022 0 71     eGFR 06/07/2022 52     Lipase 06/07/2022 6 (A)    hs TnI 0hr 06/07/2022 1,192 (A)    BNP 06/07/2022 468 (A)    SARS-CoV-2 06/07/2022 Positive (A)    INFLUENZA A PCR 06/07/2022 Negative     INFLUENZA B PCR 06/07/2022 Negative     RSV PCR 06/07/2022 Negative     Total CK 06/07/2022 92     LACTIC ACID 06/07/2022 0 8     hs TnI 4hr 06/07/2022 974 (A)    Delta 4hr hsTnI 06/07/2022 -218     WBC 06/08/2022 5 97     RBC 06/08/2022 3 78 (A)    Hemoglobin 06/08/2022 11 4 (A)    Hematocrit 06/08/2022 35 3     MCV 06/08/2022 93     MCH 06/08/2022 30 2     MCHC 06/08/2022 32 3     RDW 06/08/2022 13 2     MPV 06/08/2022 10 4     Platelets 83/08/7505 239     nRBC 06/08/2022 0     Neutrophils Relative 06/08/2022 85 (A)    Immat GRANS % 06/08/2022 0     Lymphocytes Relative 06/08/2022 8 (A)    Monocytes Relative 06/08/2022 7     Eosinophils Relative 06/08/2022 0     Basophils Relative 06/08/2022 0     Neutrophils Absolute 06/08/2022 5 08     Immature Grans Absolute 06/08/2022 0 02     Lymphocytes Absolute 06/08/2022 0 45 (A)    Monocytes Absolute 06/08/2022 0 40     Eosinophils Absolute 06/08/2022 0 01     Basophils Absolute 06/08/2022 0 01     Sodium 06/08/2022 138     Potassium 06/08/2022 3 9     Chloride 06/08/2022 104     CO2 06/08/2022 27     ANION GAP 06/08/2022 7     BUN 06/08/2022 16     Creatinine 06/08/2022 0 93     Glucose 06/08/2022 111     Calcium 06/08/2022 8 8     Corrected Calcium 06/08/2022 9 4     AST 06/08/2022 19     ALT 06/08/2022 11     Alkaline Phosphatase 06/08/2022 55     Total Protein 06/08/2022 6 3 (A)    Albumin 06/08/2022 3 2 (A)    Total Bilirubin 06/08/2022 0 51     eGFR 06/08/2022 52     CRP 06/08/2022 49 8 (A)    Procalcitonin 06/08/2022 0 08     D-Dimer, Quant 06/08/2022 1 28 (A)    A4C EF 06/08/2022 62     RVID d 06/08/2022 3 5     RA 2D Volume 06/08/2022 33 0     RAA A4C 06/08/2022 13 6     TR Peak Harprete 06/08/2022 2 3     Triscuspid Valve Regurgi* 06/08/2022 20 0     Tricuspid valve peak reg* 06/08/2022 2 26     Left Atrium Area-systoli* 06/08/2022 15 9     LV EF 06/08/2022 60     Ventricular Rate 06/07/2022 70     Atrial Rate 06/07/2022 138     CO Interval 06/07/2022 148     QRSD Interval 06/07/2022 103     QT Interval 06/07/2022 400     QTC Interval 06/07/2022 432     P Axis 06/07/2022 63     QRS Axis 06/07/2022 -3     T Wave Axis 06/07/2022 79     Sodium 06/09/2022 141     Potassium 06/09/2022 3 4 (A)    Chloride 06/09/2022 106     CO2 06/09/2022 26     ANION GAP 06/09/2022 9     BUN 06/09/2022 14     Creatinine 06/09/2022 0 94     Glucose 06/09/2022 83     Calcium 06/09/2022 8 8     Corrected Calcium 06/09/2022 9 5     AST 06/09/2022 21     ALT 06/09/2022 12     Alkaline Phosphatase 06/09/2022 53     Total Protein 06/09/2022 6 3 (A)    Albumin 06/09/2022 3 1 (A)    Total Bilirubin 06/09/2022 0 43     eGFR 06/09/2022 52     WBC 06/09/2022 4 25 (A)    RBC 06/09/2022 3 76 (A)    Hemoglobin 06/09/2022 11 2 (A)    Hematocrit 06/09/2022 35 9     MCV 06/09/2022 96     MCH 06/09/2022 29 8     MCHC 06/09/2022 31 2 (A)    RDW 06/09/2022 13 4     MPV 06/09/2022 10 4     Platelets 37/18/5479 205     nRBC 06/09/2022 0     Neutrophils Relative 06/09/2022 74     Immat GRANS % 06/09/2022 0     Lymphocytes Relative 06/09/2022 17     Monocytes Relative 06/09/2022 8     Eosinophils Relative 06/09/2022 1     Basophils Relative 06/09/2022 0     Neutrophils Absolute 06/09/2022 3 14     Immature Grans Absolute 06/09/2022 0 01     Lymphocytes Absolute 06/09/2022 0 72     Monocytes Absolute 06/09/2022 0 34     Eosinophils Absolute 06/09/2022 0 03     Basophils Absolute 06/09/2022 0 01     CRP 06/09/2022 46 0 (A)    D-Dimer, Quant 06/09/2022 1 38 (A)    Procalcitonin 06/09/2022 0 13     Sodium 06/10/2022 140     Potassium 06/10/2022 4 0     Chloride 06/10/2022 107     CO2 06/10/2022 24     ANION GAP 06/10/2022 9     BUN 06/10/2022 10     Creatinine 06/10/2022 0 85     Glucose 06/10/2022 86     Calcium 06/10/2022 8 6     eGFR 06/10/2022 58     WBC 06/10/2022 4 38     RBC 06/10/2022 3 63 (A)    Hemoglobin 06/10/2022 10 9 (A)    Hematocrit 06/10/2022 35 2     MCV 06/10/2022 97     MCH 06/10/2022 30 0     MCHC 06/10/2022 31 0 (A)    RDW 06/10/2022 13 4     MPV 06/10/2022 10 7     Platelets 62/84/2506 205     nRBC 06/10/2022 0     Neutrophils Relative 06/10/2022 67     Immat GRANS % 06/10/2022 0     Lymphocytes Relative 06/10/2022 25     Monocytes Relative 06/10/2022 7     Eosinophils Relative 06/10/2022 1     Basophils Relative 06/10/2022 0     Neutrophils Absolute 06/10/2022 2 93     Immature Grans Absolute 06/10/2022 0 01     Lymphocytes Absolute 06/10/2022 1 10     Monocytes Absolute 06/10/2022 0 29     Eosinophils Absolute 06/10/2022 0 04     Basophils Absolute 06/10/2022 0 01           Physical Exam  Vitals and nursing note reviewed  Constitutional:       Appearance: She is well-developed  HENT:      Head: Normocephalic and atraumatic  Cardiovascular:      Rate and Rhythm: Normal rate and regular rhythm  Heart sounds: Normal heart sounds  No murmur heard  Pulmonary:      Effort: Pulmonary effort is normal       Breath sounds: Normal breath sounds  No wheezing or rales  Abdominal:      General: Bowel sounds are normal  There is no distension  Palpations: Abdomen is soft  Tenderness: There is no abdominal tenderness  Musculoskeletal:         General: No tenderness  Normal range of motion  Cervical back: Normal range of motion and neck supple  Lymphadenopathy:      Cervical: No cervical adenopathy  Skin:     General: Skin is warm and dry  Capillary Refill: Capillary refill takes less than 2 seconds  Findings: No rash  Neurological:      Mental Status: She is alert  Mental status is at baseline  Cranial Nerves: No cranial nerve deficit  Sensory: No sensory deficit  Motor: No abnormal muscle tone     Psychiatric:         Behavior: Behavior normal              MD Roselyn Peñaloza 41

## 2022-06-27 PROBLEM — U07.1 COVID-19: Status: RESOLVED | Noted: 2022-01-01 | Resolved: 2022-01-01

## 2022-07-05 NOTE — DISCHARGE INSTRUCTIONS
You were seen today in the Emergency Department  Please follow up with your Primary Care Provider in the next 1-2 days to recheck your symptoms and to follow up on your visit to the Emergency Department today  Please return to the Emergency Department if you have worsening pain, cannot walk, fevers, chills, chest pain, shortness of breath, are unable to eat or drink, or have any other symptoms that concern you  Please look this over and let your nurse and/or me know if you have any further questions before you leave

## 2022-07-05 NOTE — ED PROVIDER NOTES
History  Chief Complaint   Patient presents with   Jimy Khoury     Arrived via [facility] with reports of unwitnessed fall yesterday  Arrived today to ED with right hip pain and pain with ambulation     Mikki Leung is an 80y o  year old female with PMHx significant for dementia, CKD 3, HLD, who presents to the ED today for evaluation of R hip pain after a fall that occurred at home yesterday when she was making her bed, and generalized weakness worsening since she came home from nursing facility  Daughter states she fell, unsure if mechanical or presyncopal, but no clear LOC  Unknown head strike  Short duration of time on the floor until neighbor came to help her up  She has been ambulating at baseline since  Daughter states she has been getting weaker with decreased appetite lately but fidelina not had CP, SOB, dizziness, HA, abdominal pain, n/v/d, dysuria, pain anywhere else, or any other complaints  No other known falls  Further hx and ROS limited due to dementia  History provided by:  Medical records, patient and relative  History limited by:  Dementia   used: No    Fall      Prior to Admission Medications   Prescriptions Last Dose Informant Patient Reported? Taking? ProAir  (90 Base) MCG/ACT inhaler Past Month at Unknown time  No Yes   Sig: INHALE TWO PUFFS BY MOUTH EVERY 4 HOURS AS NEEDED   acetaminophen (TYLENOL) 325 mg tablet Unknown at Unknown time  No No   Sig: Take 2 tablets (650 mg total) by mouth every 6 (six) hours as needed for mild pain, fever or headaches   aspirin (ECOTRIN LOW STRENGTH) 81 mg EC tablet 7/4/2022 at Unknown time  No Yes   Sig: Take 1 tablet (81 mg total) by mouth daily   cyanocobalamin (VITAMIN B-12) 1,000 mcg tablet 7/4/2022 at Unknown time Self Yes Yes   Sig: Take 1,000 mcg by mouth daily  fluticasone-salmeterol (Advair) 100-50 mcg/dose inhaler Past Month at Unknown time  Yes Yes   Sig: Inhale 1 puff 2 (two) times a day Rinse mouth after use  gabapentin (NEURONTIN) 100 mg capsule Past Month at Unknown time  Yes Yes   Sig: Take 100 mg by mouth 3 (three) times a day   melatonin 3 mg Not Taking at Unknown time  No No   Sig: Take 1 tablet (3 mg total) by mouth daily at bedtime for 14 days   Patient not taking: Reported on 7/5/2022   multivitamin-iron-minerals-folic acid (THERAPEUTIC-M) TABS tablet 7/4/2022 at Unknown time  No Yes   Sig: Take 1 tablet by mouth daily   pravastatin (PRAVACHOL) 10 mg tablet Not Taking at Unknown time  No No   Sig: TAKE ONE TABLET BY MOUTH EVERY DAY   Patient not taking: No sig reported   traMADol (ULTRAM) 50 mg tablet 7/4/2022 at Unknown time  Yes Yes   Sig: Take 50 mg by mouth every 6 (six) hours as needed for moderate pain      Facility-Administered Medications: None       Past Medical History:   Diagnosis Date    Arthritis     Asthma     Cancer ([facility] )     Chondrocalcinosis     Chronic sciatica     Dementia ([facility] )     Diverticulosis     GERD (gastroesophageal reflux disease)     Gout     High blood pressure     History of low potassium     Hyperlipidemia     Insomnia     Iron deficiency anemia     Low vitamin D level     [facility]     Neuropathy     Stage 3a chronic kidney disease ([facility] ) 11/16/2021    Uterine cancer ([facility] )        Past Surgical History:   Procedure Laterality Date    APPENDECTOMY      CATARACT EXTRACTION Right     COLONOSCOPY      2012?  EGD  04/2019    upper- Hiatal hernia    ESOPHAGOGASTRODUODENOSCOPY      HYSTERECTOMY      TONSILLECTOMY         Family History   Problem Relation Age of Onset    Cancer Sister     Stroke Sister     Heart disease Brother      I have reviewed and agree with the history as documented      E-Cigarette/Vaping    E-Cigarette Use Never User      E-Cigarette/Vaping Substances     Social History     Tobacco Use    Smoking status: Never Smoker    Smokeless tobacco: Never Used   Vaping Use    Vaping Use: Never used   Substance Use Topics  Alcohol use: No    Drug use: No       Review of Systems   Unable to perform ROS: Dementia       Physical Exam  Physical Exam  Vitals and nursing note reviewed  Constitutional:       General: She is not in acute distress  Appearance: She is well-developed  She is not diaphoretic  HENT:      Head: Normocephalic and atraumatic  Nose: Nose normal       Mouth/Throat:      Mouth: Mucous membranes are moist    Eyes:      General: No scleral icterus  Extraocular Movements: Extraocular movements intact  Conjunctiva/sclera: Conjunctivae normal    Neck:      Vascular: No JVD  Trachea: No tracheal deviation  Cardiovascular:      Rate and Rhythm: Normal rate and regular rhythm  Pulmonary:      Effort: Pulmonary effort is normal  No respiratory distress  Breath sounds: Normal breath sounds  Abdominal:      General: There is no distension  Palpations: Abdomen is soft  Tenderness: There is no abdominal tenderness  Musculoskeletal:         General: No deformity  Cervical back: Neck supple  Comments: ttp right hip   Skin:     General: Skin is warm and dry  Neurological:      Mental Status: She is alert        Comments: Moves all extremities with good strength, face symmetric, normal conversation, confused, PERRL   Psychiatric:         Behavior: Behavior normal          Vital Signs  ED Triage Vitals   Temperature Pulse Respirations Blood Pressure SpO2   07/05/22 1510 07/05/22 1450 07/05/22 1450 07/05/22 1450 07/05/22 1450   98 3 °F (36 8 °C) 64 18 (!) 140/48 100 %      Temp Source Heart Rate Source Patient Position - Orthostatic VS BP Location FiO2 (%)   07/05/22 1510 07/05/22 1600 -- -- --   Oral Monitor         Pain Score       --                  Vitals:    07/05/22 1450 07/05/22 1600 07/05/22 1700   BP: (!) 140/48 140/57 120/66   Pulse: 64 60 71         Visual Acuity      ED Medications  Medications   cephalexin (KEFLEX) capsule 250 mg (250 mg Oral Given 7/5/22 1657)       Diagnostic Studies  Results Reviewed     Procedure Component Value Units Date/Time    HS Troponin I 2hr [[id number]] Collected: 07/05/22 1701    Lab Status: In process Specimen: Blood from Arm, Right Updated: 07/05/22 1705    Urine Microscopic [[id number]]  (Abnormal) Collected: 07/05/22 1554    Lab Status: Final result Specimen: Urine, Straight Cath Updated: 07/05/22 1638     RBC, UA None Seen /hpf      WBC, UA Innumerable /hpf      Epithelial Cells Moderate /hpf      Bacteria, UA Moderate /hpf     UA (URINE) with reflex to Scope [[id number]]  (Abnormal) Collected: 07/05/22 1554    Lab Status: Final result Specimen: Urine, Straight Cath Updated: 07/05/22 1620     Color, UA Yellow     Clarity, UA Cloudy     Specific [place], UA 1 020     pH, UA 6 0     Leukocytes, UA 2+     Nitrite, UA Negative     Protein, UA Negative mg/dl      Glucose, UA Negative mg/dl      Ketones, UA Trace mg/dl      Urobilinogen, UA 0 2 E U /dl      Bilirubin, UA Negative     Occult Blood, UA Trace-lysed    HS Troponin I 4hr [[id number]]     Lab Status: No result Specimen: Blood     TSH, 3rd generation with Free T4 reflex [[id number]]  (Normal) Collected: 07/05/22 1508    Lab Status: Final result Specimen: Blood from Arm, Right Updated: 07/05/22 1546     TSH 3RD GENERATON 2 014 uIU/mL     Narrative:      Patients undergoing fluorescein dye angiography may retain small amounts of fluorescein in the body for 48-72 hours post procedure  Samples containing fluorescein can produce falsely depressed TSH values  If the patient had this procedure,a specimen should be resubmitted post fluorescein clearance        HS Troponin 0hr (reflex protocol) [[id number]]  (Normal) Collected: 07/05/22 1508    Lab Status: Final result Specimen: Blood from Arm, Right Updated: 07/05/22 1538     hs TnI 0hr 15 ng/L     Comprehensive metabolic panel [[id number]]  (Abnormal) Collected: 07/05/22 1508    Lab Status: Final result Specimen: Blood from Arm, Right Updated: 07/05/22 1531     Sodium 136 mmol/L      Potassium 4 0 mmol/L      Chloride 103 mmol/L      CO2 25 mmol/L      ANION GAP 8 mmol/L      BUN 14 mg/dL      Creatinine 0 87 mg/dL      Glucose 100 mg/dL      Calcium 8 7 mg/dL      Corrected Calcium 9 3 mg/dL      AST 14 U/L      ALT 11 U/L      Alkaline Phosphatase 63 U/L      Total Protein 6 0 g/dL      Albumin 3 3 g/dL      Total Bilirubin 0 85 mg/dL      eGFR 56 ml/min/1 73sq m     Narrative:      [place] guidelines for Chronic Kidney Disease (CKD):     Stage 1 with normal or high GFR (GFR > 90 mL/min/1 73 square meters)    Stage 2 Mild CKD (GFR = 60-89 mL/min/1 73 square meters)    Stage 3A Moderate CKD (GFR = 45-59 mL/min/1 73 square meters)    Stage 3B Moderate CKD (GFR = 30-44 mL/min/1 73 square meters)    Stage 4 Severe CKD (GFR = 15-29 mL/min/1 73 square meters)    Stage 5 End Stage CKD (GFR <15 mL/min/1 73 square meters)  Note: GFR calculation is accurate only with a steady state creatinine    CBC and differential [[id number]] Collected: 07/05/22 1508    Lab Status: Final result Specimen: Blood from Arm, Right Updated: 07/05/22 1513     WBC 7 58 Thousand/uL      RBC 3 89 Million/uL      Hemoglobin 11 6 g/dL      Hematocrit 35 6 %      MCV 92 fL      MCH 29 8 pg      MCHC 32 6 g/dL      RDW 14 6 %      MPV 10 5 fL      Platelets 588 Thousands/uL      nRBC 0 /100 WBCs      Neutrophils Relative 70 %      Immat GRANS % 0 %      Lymphocytes Relative 20 %      Monocytes Relative 8 %      Eosinophils Relative 2 %      Basophils Relative 0 %      Neutrophils Absolute 5 26 Thousands/µL      Immature Grans Absolute 0 02 Thousand/uL      Lymphocytes Absolute 1 54 Thousands/µL      Monocytes Absolute 0 61 Thousand/µL      Eosinophils Absolute 0 13 Thousand/µL      Basophils Absolute 0 02 Thousands/µL                  XR hip/pelv 2-3 vws right if performed   ED Interpretation by Mode Islas DO (07/05 1602)   No acute fx      CT head without contrast   Final Result by Rtia Bennett DO (07/05 5907)      No acute intracranial abnormality  Microangiopathic changes  Workstation performed: [id number]                    Procedures  Procedures         ED Course  ED Course as of 07/05/22 1707 Tue Jul 05, 2022   1521 Procedure Note: EKG  Date/Time: 07/05/22 3:21 PM   Interpreted by: Candi Oconnor DO  Indications / Diagnosis: weakness  ECG reviewed by me, the ED Provider: yes   The EKG demonstrates:  Rhythm: sinus, rate 65  Intervals: normal intervals  Axis: normal axis  QRS: normal QRS  ST Changes: No acute ST Changes, no STD/HENRIQUE        1604 Procedure Note: EKG  Date/Time: 07/05/22 4:08 PM   Interpreted by: Candi Oconnor DO  Indications / Diagnosis: dizziness  ECG reviewed by me, the ED Provider: yes   The EKG demonstrates:  Rhythm: sinus, rate 75  Intervals: normal intervals  Axis: normal axis  QRS: normal QRS  ST Changes: No acute ST Changes, no STD/HENRIQUE or T wave changes       1629 Clarity, UA(!): Cloudy   1645 Bacteria, UA(!): Moderate   1649 Discussed disposition options with daughter  Daughter states she would never want her mother to be in a nursing home like to keep her at home with her  Understands that decreased oral intake may be part further course for worsening dementia and we discussed ways to overcome this  Daughter states that patient has home PT/OT that comes to visit her at home  Likely would not benefit from admission if able to ambulate here in the emergency department  Will give prescription for obtain  She has a walker at home to use  Okay was discharged with return precautions and antibiotics for UTI  Close return precautions given  SBIRT 20yo+    Flowsheet Row Most Recent Value   SBIRT (23 yo +)    In order to provide better care to our patients, we are screening all of our patients for alcohol and drug use   Would it be okay to ask you these screening questions? No Filed at: 07/05/2022 1455                    MDM      Disposition  Final diagnoses:   Fall, initial encounter   Right hip pain   Dementia ([facility] )   UTI (urinary tract infection)     Time reflects when diagnosis was documented in both MDM as applicable and the Disposition within this note     Time User Action Codes Description Comment    7/5/2022  4:46 PM Lucas Cruel Add Hollie Manley  XQFW] Fall, initial encounter     7/5/2022  4:46 PM Jorge Pandey Right hip pain     7/5/2022  4:46 PM Parke Cruel Add [F03 90] Dementia ([facility] )     7/5/2022  4:47 PM Lucas Cruel Add [N39 0] UTI (urinary tract infection)       ED Disposition     ED Disposition   Discharge    Condition   Stable    Date/Time   Tue Jul 5, 2022  4:49 PM    Comment   Dane Holbrook discharge to home/self care  Results of completed tests discussed  Return to ER precautions given, verbal and written, and questions answered satisfactorily                     Follow-up Information     Follow up With Specialties Details Why Contact Info    Germán Carr MD Family Medicine Call in 1 day To recheck symptoms and follow up on your ER visit [phone] [facility]  [phone]            Patient's Medications   Discharge Prescriptions    CEPHALEXIN (KEFLEX) 250 MG CAPSULE    Take 2 capsules (500 mg total) by mouth every 12 (twelve) hours for 5 days       Start Date: 7/5/2022  End Date: 7/10/2022       Order Dose: 500 mg       Quantity: 20 capsule    Refills: 0       Outpatient Discharge Orders   Cane       PDMP Review       Value Time User    PDMP Reviewed  Yes 6/27/2022  6:28 AM Germán Carr MD          ED Provider  Electronically Signed by           Patsy Bernard DO  07/05/22 1707

## 2022-07-22 PROBLEM — R65.10 SIRS (SYSTEMIC INFLAMMATORY RESPONSE SYNDROME) (HCC): Status: ACTIVE | Noted: 2022-01-01

## 2022-07-22 PROBLEM — E87.6 HYPOKALEMIA: Status: ACTIVE | Noted: 2022-01-01

## 2022-07-22 PROBLEM — E87.2 LACTIC ACIDOSIS: Status: ACTIVE | Noted: 2022-01-01

## 2022-07-22 PROBLEM — E87.20 LACTIC ACIDOSIS: Status: ACTIVE | Noted: 2022-01-01

## 2022-07-22 NOTE — ED PROVIDER NOTES
History  Chief Complaint   Patient presents with    Shortness of Breath     Pt presents with c/o sob and diarrhea x 2 days     Patient was brought to the emergency department by her daughter for near syncopal episode  The patient tells me that she has limited recollection of this episode  She left her air conditioning room to go to a bathroom in the back of the house that was not air conditioned  Of note, we are in the middle of a heat wave and the temperature is in the 90s  Was sitting on the toilet the patient called out for help  The patient's daughter found the patient to be on the toilet close to passing out looking week with beet red face  Patient also complained of feeling short of breath and was noted to be wheezing  Ambulance was called and gave the patient a nebulizer  They did note her initial pulse ox was in the 80s  They also gave her 500 mL of fluid  Upon transport patient returned to baseline mental status  Patient notes that she has had some loose stools for the past 2 days  She does me this is pretty typical for her  She denies any abdominal pain  She denies fevers or chills  She denies any recent cough or congestion but reports she did feel short of breath under house  She tells me she no longer feels short of breath  Symptom onset was abrupt and symptoms were constant and tell transport  They were severe  Nevertheless, patient did not completely lose consciousness  She did recently have COVID from which she feels completely recovered  Shortness of Breath      Prior to Admission Medications   Prescriptions Last Dose Informant Patient Reported? Taking?    ProAir  (90 Base) MCG/ACT inhaler   No No   Sig: INHALE TWO PUFFS BY MOUTH EVERY 4 HOURS AS NEEDED   acetaminophen (TYLENOL) 325 mg tablet   No No   Sig: Take 2 tablets (650 mg total) by mouth every 6 (six) hours as needed for mild pain, fever or headaches   aspirin (ECOTRIN LOW STRENGTH) 81 mg EC tablet   No No Sig: Take 1 tablet (81 mg total) by mouth daily   ciprofloxacin (CIPRO) 500 mg tablet   No No   Sig: Take 1 tablet (500 mg total) by mouth every 12 (twelve) hours for 7 days   cyanocobalamin (VITAMIN B-12) 1,000 mcg tablet  Self Yes No   Sig: Take 1,000 mcg by mouth daily  fluticasone-salmeterol (Advair) 100-50 mcg/dose inhaler   Yes No   Sig: Inhale 1 puff 2 (two) times a day Rinse mouth after use    gabapentin (NEURONTIN) 100 mg capsule   Yes No   Sig: Take 100 mg by mouth 3 (three) times a day   multivitamin-iron-minerals-folic acid (THERAPEUTIC-M) TABS tablet   No No   Sig: Take 1 tablet by mouth daily   pravastatin (PRAVACHOL) 10 mg tablet   No No   Sig: TAKE ONE TABLET BY MOUTH EVERY DAY   Patient not taking: No sig reported   traMADol (ULTRAM) 50 mg tablet   Yes No   Sig: Take 50 mg by mouth every 6 (six) hours as needed for moderate pain      Facility-Administered Medications: None       Past Medical History:   Diagnosis Date    Arthritis     Asthma     Cancer ([facility] )     Chondrocalcinosis     Chronic sciatica     Dementia (HCC)     Diverticulosis     GERD (gastroesophageal reflux disease)     Gout     High blood pressure     History of low potassium     Hyperlipidemia     Insomnia     Iron deficiency anemia     Low vitamin D level     Myocardial infarction (HCC)     Neuropathy     Stage [facility]a [facility] ) 11/16/2021    Uterine cancer ([facility] )        Past Surgical History:   Procedure Laterality Date    APPENDECTOMY      CATARACT EXTRACTION Right     COLONOSCOPY      2012?  EGD  04/2019    upper- Hiatal hernia    ESOPHAGOGASTRODUODENOSCOPY      HYSTERECTOMY      TONSILLECTOMY         Family History   Problem Relation Age of Onset    Cancer Sister     Stroke Sister     Heart disease Brother      I have reviewed and agree with the history as documented      E-Cigarette/Vaping    E-Cigarette Use Never User      E-Cigarette/Vaping Substances     Social History Tobacco Use    Smoking status: Never Smoker    Smokeless tobacco: Never Used   Vaping Use    Vaping Use: Never used   Substance Use Topics    Alcohol use: No    Drug use: No       Review of Systems   Respiratory: Positive for shortness of breath  All other systems reviewed and are negative  Physical Exam  Physical Exam  Vitals and nursing note reviewed  Constitutional:       General: She is not in acute distress  Appearance: She is well-developed  HENT:      Head: Normocephalic and atraumatic  Eyes:      Conjunctiva/sclera: Conjunctivae normal    Cardiovascular:      Rate and Rhythm: Normal rate and regular rhythm  Heart sounds: No murmur heard  Pulmonary:      Effort: Pulmonary effort is normal  No accessory muscle usage or respiratory distress  Breath sounds: No stridor  Wheezing (Moderate diffuse bilaterally with mildly prolonged expiratory phase, good air movement, no accessory muscle use) present  Abdominal:      Palpations: Abdomen is soft  Tenderness: There is no abdominal tenderness  Musculoskeletal:      Cervical back: Neck supple  Skin:     General: Skin is warm and dry  Neurological:      Mental Status: She is alert           Vital Signs  ED Triage Vitals   Temperature Pulse Respirations Blood Pressure SpO2   07/22/22 1536 07/22/22 1532 07/22/22 1532 07/22/22 1535 07/22/22 1532   97 5 °F (36 4 °C) 94 17 154/54 100 %      Temp Source Heart Rate Source Patient Position - Orthostatic VS BP Location FiO2 (%)   07/22/22 1536 07/22/22 1532 07/22/22 1532 07/22/22 1532 --   Oral Monitor Lying Left arm       Pain Score       07/22/22 1910       No Pain           Vitals:    07/22/22 1830 07/22/22 1833 07/22/22 1837 07/22/22 1910   BP: 93/72 (!) 127/43 119/52 113/70   Pulse: 86 88 93 88   Patient Position - Orthostatic VS: Lying - Orthostatic VS Sitting - Orthostatic VS Standing - Orthostatic VS Lying         Visual Acuity      ED Medications  Medications ipratropium-albuterol (DUO-NEB) 0 5-2 5 mg/3 mL inhalation solution 3 mL (3 mL Nebulization Given 7/22/22 2013)   aspirin (ECOTRIN LOW STRENGTH) EC tablet 81 mg (has no administration in time range)   fluticasone-vilanterol (BREO ELLIPTA) 200-25 MCG/INH inhaler 1 puff (has no administration in time range)   gabapentin (NEURONTIN) capsule 100 mg (has no administration in time range)   albuterol (PROVENTIL HFA,VENTOLIN HFA) inhaler 2 puff (has no administration in time range)   traMADol (ULTRAM) tablet 50 mg (has no administration in time range)   acetaminophen (TYLENOL) tablet 650 mg (has no administration in time range)   ondansetron (ZOFRAN) injection 4 mg (has no administration in time range)   multi-electrolyte (PLASMALYTE-A/ISOLYTE-S PH 7 4) IV solution (has no administration in time range)   heparin (porcine) subcutaneous injection 5,000 Units (has no administration in time range)   ipratropium-albuterol (FOR EMS ONLY) (DUO-NEB) 0 5-2 5 mg/3 mL inhalation solution 3 mL (0 mL Does not apply Given to EMS 7/22/22 1535)   albuterol (FOR EMS ONLY) (2 5 mg/3 mL) 0 083 % inhalation solution 2 5 mg (0 mg Does not apply Given to EMS 7/22/22 1535)   sodium chloride 0 9 % bolus 500 mL (0 mL Intravenous Stopped 7/22/22 1604)   methylPREDNISolone sodium succinate (Solu-MEDROL) injection 125 mg (125 mg Intravenous Given 7/22/22 1600)   sodium chloride 0 9 % bolus 1,000 mL (0 mL Intravenous Stopped 7/22/22 1945)       Diagnostic Studies  Results Reviewed     Procedure Component Value Units Date/Time    Platelet count [[id number]]     Lab Status: No result Specimen: Blood     Lactic acid 2 Hours [[id number]]  (Abnormal) Collected: 07/22/22 2024    Lab Status: Final result Specimen: Blood from Arm, Left Updated: 07/22/22 2057     LACTIC ACID 6 8 mmol/L     Narrative:      Result may be elevated if tourniquet was used during collection      Lactic acid [[id number]]     Lab Status: No result Specimen: Blood     Lactic acid, plasma [[id number]]  (Abnormal) Collected: 07/22/22 1952    Lab Status: Final result Specimen: Blood from Arm, Left Updated: 07/22/22 2015     LACTIC ACID 6 5 mmol/L     Narrative:      Result may be elevated if tourniquet was used during collection  HS Troponin I 2hr [[id number]]  (Normal) Collected: 07/22/22 1734    Lab Status: Final result Specimen: Blood from Arm, Left Updated: 07/22/22 1819     hs TnI 2hr 14 ng/L      Delta 2hr hsTnI 2 ng/L     Lactic acid 2 Hours [[id number]]  (Abnormal) Collected: 07/22/22 1734    Lab Status: Final result Specimen: Blood from Arm, Left Updated: 07/22/22 1804     LACTIC ACID 2 8 mmol/L     Narrative:      Result may be elevated if tourniquet was used during collection      CK (with reflex to MB) [[id number]]  (Normal) Collected: 07/22/22 1545    Lab Status: Final result Specimen: Blood from Arm, Left Updated: 07/22/22 1631     Total CK 63 U/L     Comprehensive metabolic panel [[id number]]  (Abnormal) Collected: 07/22/22 1545    Lab Status: Final result Specimen: Blood from Arm, Left Updated: 07/22/22 1631     Sodium 140 mmol/L      Potassium 3 1 mmol/L      Chloride 107 mmol/L      CO2 23 mmol/L      ANION GAP 10 mmol/L      BUN 13 mg/dL      Creatinine 0 94 mg/dL      Glucose 215 mg/dL      Calcium 8 1 mg/dL      Corrected Calcium 8 8 mg/dL      AST 20 U/L      ALT 10 U/L      Alkaline Phosphatase 50 U/L      Total Protein 5 3 g/dL      Albumin 3 1 g/dL      Total Bilirubin 0 65 mg/dL      eGFR 51 ml/min/1 73sq m     Narrative:      [place] guidelines for Chronic Kidney Disease (CKD):     Stage 1 with normal or high GFR (GFR > 90 mL/min/1 73 square meters)    Stage 2 Mild CKD (GFR = 60-89 mL/min/1 73 square meters)    Stage 3A Moderate CKD (GFR = 45-59 mL/min/1 73 square meters)    Stage 3B Moderate CKD (GFR = 30-44 mL/min/1 73 square meters)    Stage 4 Severe CKD (GFR = 15-29 mL/min/1 73 square meters)    Stage 5 End Stage CKD (GFR <15 mL/min/1 73 square meters)  Note: GFR calculation is accurate only with a steady state creatinine    Procalcitonin [[id number]]  (Normal) Collected: 07/22/22 1545    Lab Status: Final result Specimen: Blood from Arm, Left Updated: 07/22/22 1618     Procalcitonin 0 05 ng/ml     HS Troponin 0hr (reflex protocol) [[id number]]  (Normal) Collected: 07/22/22 1545    Lab Status: Final result Specimen: Blood from Arm, Left Updated: 07/22/22 1616     hs TnI 0hr 12 ng/L     Lactic acid [[id number]]  (Abnormal) Collected: 07/22/22 1545    Lab Status: Final result Specimen: Blood from Arm, Left Updated: 07/22/22 1615     LACTIC ACID 2 9 mmol/L     Narrative:      Result may be elevated if tourniquet was used during collection  CBC and differential [[id number]]  (Abnormal) Collected: 07/22/22 1545    Lab Status: Final result Specimen: Blood from Arm, Left Updated: 07/22/22 1552     WBC 10 77 Thousand/uL      RBC 3 40 Million/uL      Hemoglobin 10 2 g/dL      Hematocrit 32 0 %      MCV 94 fL      MCH 30 0 pg      MCHC 31 9 g/dL      RDW 15 2 %      MPV 9 9 fL      Platelets 988 Thousands/uL      nRBC 0 /100 WBCs      Neutrophils Relative 72 %      Immat GRANS % 1 %      Lymphocytes Relative 23 %      Monocytes Relative 3 %      Eosinophils Relative 1 %      Basophils Relative 0 %      Neutrophils Absolute 7 83 Thousands/µL      Immature Grans Absolute 0 05 Thousand/uL      Lymphocytes Absolute 2 43 Thousands/µL      Monocytes Absolute 0 29 Thousand/µL      Eosinophils Absolute 0 15 Thousand/µL      Basophils Absolute 0 02 Thousands/µL                  XR chest 1 view portable   ED Interpretation by Christal Jeffrey MD (07/22 1702)   Left pleural effusion, no focal consolidation                 Procedures  Procedures         ED Course  ED Course as of 07/22/22 2128 Fri Jul 22, 2022   Trkeshawn Shannon 1 I re-evaluated the patient  She mg any wheezing and feels clinically better  Lactic acid still elevated after cautious hydration    Patient is also drinking PO liquids  History of findings consistent with heat exhaustion from which patient is improved  Will check orthostatics prior to disposition  1847 Patient's orthostatics reviewed  I will treat patient with additional unit of IVF due to supine SBP of 93 and re-examine patient although of note MAP remains >70 in all three positions   2019 LACTIC ACID(!!): 6 5  Repeat lactic acid now significantly elevated but patient reports being back at baseline and has normal vitals  Will repeat immediately to evaluate for lab error  2127 LACTIC ACID(!!): 6 8  Lactate increasing but patient remains asymptomatic and ambulates well in the ED  NO clinical symptoms to suggest infection  Case discussed with Dr Ema Monterroso who accepted patient for observation  SBIRT 20yo+    Flowsheet Row Most Recent Value   SBIRT (25 yo +)    In order to provide better care to our patients, we are screening all of our patients for alcohol and drug use  Would it be okay to ask you these screening questions? No Filed at: 07/22/2022 1932                    MDM  Number of Diagnoses or Management Options  Diagnosis management comments: Despite telling me she feels improved, patient does have continued wheezing  Will treat with nebulizers  Patient's pulse ox was reportedly in the 80s at her home but now on room air and she is knee upper 90s  Will continue to evaluate  Amount and/or Complexity of Data Reviewed  Clinical lab tests: ordered and reviewed  Tests in the radiology section of CPT®: ordered and reviewed        Disposition  Final diagnoses:   Near syncope   Heat exhaustion, initial encounter   Lactic acidosis     Time reflects when diagnosis was documented in both MDM as applicable and the Disposition within this note     Time User Action Codes Description Comment    7/22/2022  4:05 PM Lennis Merlin Add [R55] Near syncope     7/22/2022  4:05 PM Lennis Merlin Add [T67  5XXA] Heat exhaustion, initial encounter     7/22/2022  9:19 PM Duran Ty Add [E87 2] Lactic acidosis       ED Disposition     None      Follow-up Information    None         Patient's Medications   Discharge Prescriptions    No medications on file       No discharge procedures on file      PDMP Review       Value Time User    PDMP Reviewed  Yes 6/27/2022  6:28 AM Amador Huang MD          ED Provider  Electronically Signed by           Ruth Lagunas MD  07/22/22 2121

## 2022-07-23 PROBLEM — G93.40 ENCEPHALOPATHY: Status: ACTIVE | Noted: 2022-01-01

## 2022-07-23 PROBLEM — T17.908A ASPIRATION INTO AIRWAY: Status: ACTIVE | Noted: 2022-01-01

## 2022-07-23 NOTE — ASSESSMENT & PLAN NOTE
· Unclear etiology  · Patient received 1 L of normal saline solution in the ED with mild improvement of lactate from 2 9-2 8 however after another L of IV fluids lactate up to 6 5 and now 6 8  · No signs of active infection  · Patient not hypotensive  · Will hydrate with Isolyte at 125 cc/hour  · Trend lactate until normalized  · Continue to monitor for signs of active infection

## 2022-07-23 NOTE — ASSESSMENT & PLAN NOTE
· Initially presents following a syncopal episode  · Likely heat exhaustion which has since resolved  · Will consult PT/OT  · Up with assistance

## 2022-07-23 NOTE — RESPIRATORY THERAPY NOTE
RT Protocol Note  Mica Anthony 80 y o  female MRN: [id number]  Unit/Bed#: -01 Encounter: [id number]    Assessment    Principal Problem:    Lactic acidosis  Active Problems:    General weakness    Essential hypertension    Asthma    Alzheimer's disease, early onset ([facility] )    Continuous opioid dependence (HCC)    Stage 3a chronic kidney disease (HCC)    Hypokalemia      Home Pulmonary Medications:  Advair 100/50 BID  Pro Air 2puffs Q4PRN   07/22/22 6994   Respiratory Protocol   Protocol Initiated? Yes   Protocol Selection Respiratory   Language Barrier? No   Medical & Social History Reviewed? Yes   Diagnostic Studies Reviewed? Yes   Physical Assessment Performed? Yes   Respiratory Plan Home Bronchodilator Patient pathway   Respiratory Assessment   Assessment Type Assess only   General Appearance Alert; Awake   Respiratory Pattern Normal   Chest Assessment Chest expansion symmetrical   Bilateral Breath Sounds Diminished   Cough None   Resp Comments Pty assessed for Respiratory Protocol  BS diminishe, SPO2 99% on room air  Will continue with pt's home bronchodilators  Additional Assessments   Pulse 86   Respirations 16   SpO2 99 %          Past Medical History:   Diagnosis Date    Arthritis     Asthma     Cancer ([facility] )     Chondrocalcinosis     Chronic sciatica     D[facility] ([facility])     Diverticulosis     GERD (gastroesophageal reflux disease)     Gout     High blood pressure     History of low potassium     Hyperlipidemia     Insomnia     Iron deficiency anemia     Low vitamin D level     Myocardial infarction ([facility] )     Neuropathy     Stage 3a chronic kidney disease ([facility] ) 11/16/2021    Uterine cancer ([facility] )      Social History     Socioeconomic History    Marital status:       Spouse name: None    Number of children: None    Years of education: None    Highest education level: None   Occupational History    Occupation: Retired    Tobacco Use    Smoking status: Never Smoker    Smokeless tobacco: Never Used Vaping Use    Vaping Use: Never used   Substance and Sexual Activity    Alcohol use: No    Drug use: No    Sexual activity: None   Other Topics Concern    None   Social History Narrative    Most recent tobacco use screenin2020    Do you currently or have you served in the [employer]: No    Were you activated, into active duty, as a member of the [employer] or as a Reservist: No    Occupation: retired    Marital status:     Sexual orientation: Heterosexual    Diet: Regular    General stress level: Low    Alcohol intake: None    Caffeine intake: Occasional    Chewing tobacco: none    Illicit drugs: denies    Guns present in home: No    Seat belts used routinely: Yes    Sunscreen used routinely: Yes    Smoke alarm in home: Yes    Advance directive: Yes    Live alone or with others: with others    International travel: none    Pets: No    Sexually active: No     Social Determinants of Health     Financial Resource Strain: Not on file   Food Insecurity: No Food Insecurity    Worried About Running Out of Food in the Last Year: Never true    Ran Out of Food in the Last Year: Never true   Transportation Needs: No Transportation Needs    Lack of Transportation (Medical): No    Lack of Transportation (Non-Medical): No   Physical Activity: Not on file   Stress: Not on file   Social Connections: Not on file   Intimate Partner Violence: Not on file   Housing Stability: Unknown    Unable to Pay for Housing in the Last Year: No    Number of Places Lived in the Last Year: Not on file    Unstable Housing in the Last Year: No       Subjective         Objective    Physical Exam:   Assessment Type: Assess only  General Appearance: Alert, Awake  Respiratory Pattern: Normal  Chest Assessment: Chest expansion symmetrical  Bilateral Breath Sounds: Diminished  Cough: None    Vitals:  Blood pressure 113/70, pulse 86, temperature 97 5 °F (36 4 °C), temperature source Oral, resp  rate 16, SpO2 99 %            Imaging and other studies: I have personally reviewed pertinent reports  Plan    Respiratory Plan: Home Bronchodilator Patient pathway        Resp Comments: Pty assessed for Respiratory Protocol  BS diminishe, SPO2 99% on room air  Will continue with pt's home bronchodilators

## 2022-07-23 NOTE — ASSESSMENT & PLAN NOTE
Lab Results   Component Value Date    EGFR 51 07/22/2022    EGFR 56 07/05/2022    EGFR 58 06/10/2022    CREATININE 0 94 07/22/2022    CREATININE 0 87 07/05/2022    CREATININE 0 85 06/10/2022   · Creatinine at baseline  · Avoid hypotension and nephrotoxic agents  · Trend BMP

## 2022-07-23 NOTE — UTILIZATION REVIEW
Initial Clinical Review    Admission: Date/Time/Statement:     OBSERVATION 7-22-22 CHANGED TO INPATIENT 7-23-22 FOR CONTINUED TREATMENT OF URINARY TRACT INFECTION WITH IV CEFTRIAXONE     07/23/22 1318  Inpatient Admission  Once        Transfer Service: Hospitalist       Question Answer   Level of Care Med Surg   Estimated length of stay More than 2 Midnights   Certification I certify that inpatient services are medically necessary for this patient for a duration of greater than two midnights  See H&P and MD Progress Notes for additional information about the patient's course of treatment          07/22/22 2117  Place in Observation  Once        Transfer Service: Hospitalist       Question: Level of Care Answer: Med Surg            ED Arrival Information     Expected   -    Arrival   7/22/2022 15:28    Acuity   Urgent            Means of arrival   Ambulance    Escorted by   [facility]    Service   Hospitalist    Admission type   Urgent            Arrival complaint   sob           Chief Complaint   Patient presents with    Shortness of Breath     Pt presents with c/o sob and diarrhea x 2 days       Initial Presentation: 80 y o  female presents to ed from home via ems for evaluation and treatment of shortness of breath and diarrhea for 2 days   Daughter concerned because patient had a near syncopal episode  Bathroom, where the event happened is not air conditioned and there is a heat wave with temperatures in 90s  Prior to arrival ems treated wit duo neb x1 and albuterol x1  PMHX:  CANCER, MI, HTN , ASTHMA, DEMENTIA   linical assessment significant for wheezing with prolonged expiratory phase, hypotension 93/72 / 118/43  LA 2 9 /  6 5/ 8 2, K+ 3/1  GLUCOSE 215 WBC 10 77    Initially treated with iv  9% ns bolus, duo neb x2, iv solumedrol  Admit to observation for lactic acidosis  Obtain orthostatic bp and continue iv fluids   Trend bmp    Date: 7-23-22    Day 2: observation to inpatient Daughter reports urinary frequency and dysuria over 3 days  Continue iv fluids, add iv ceftriaxone, and trend lactic acid /bmp  Monitor vital signs  Obtain PT/OT evaluations  Replete potasium  Addendum : temp decreasing 98 / 97 2 / 96 2 and   bp decreased 156/ 85 to  99/40    ED Triage Vitals   07/22/22 1536 07/22/22 1532 07/22/22 1532 07/22/22 1535 07/22/22 1532   97 5 °F (36 4 °C) 94 17 154/54 100 %      Oral Monitor         No Pain          07/22/22 60 4 kg (133 lb 2 5 oz)     Additional Vital Signs:       Date/  Time Temp Pulse Resp BP MAP (mmHg) SpO2 O2 Device Patient Position - Orthostatic VS   07/23/22 0005 98 7 °F (37 1 °C) 92 16 99/40 Abnormal  62 Abnormal  98 % None (Room air)    07/22/22 2300 -- -- -- -- -- -- None (Room air)    07/22/22 2155 -- 86 16 -- -- 99 % None (Room air)    07/22/22 2152 98 °F (36 7 °C) 74 16 156/85 -- 100 % None (Room air)    07/22/22 1910 -- 88 16 113/70 -- 100 % None (Room air)    07/22/22 1837  93  119/52    Standing - Orthostatic VS   07/22/22 1833  88  127/43   Abnormal     Sitting - Orthostatic VS   07/22/22 1830  86  93/72    Lying - Orthostatic VS   07/22/22 1738 -- 91 14 125/93 -- 95 % None (Room air)    07/22/22 1604 -- 90 15 118/43 Abnormal  -- 100 % None (Room air)    07/22/22 1540 -- -- -- -- -- -- None (Room air)    07/22/22 1536 97 5 °F (36 4 °C) -- -- -- -- -- --    07/22/22 1535 -- -- -- 154/54 -- -- --    07/22/22 1532 -- 94 17 -- -- 100 % None (Room air)          Pertinent Labs/Diagnostic Test Results:   XR chest 1 view portable   Final  (07/23 0122)      No acute cardiopulmonary disease              Results from last 7 days   Lab Units 07/23/22  0548 07/22/22  2213 07/22/22  1545   WBC Thousand/uL 9 52  --  10 77*   HEMOGLOBIN g/dL 9 4*  --  10 2*   HEMATOCRIT % 29 1*  --  32 0*   PLATELETS Thousands/uL 166 159 185   NEUTROS ABS Thousands/µL  --   --  7 83*         Results from last 7 days   Lab Units 07/23/22  0548 07/22/22  1545   SODIUM mmol/L 146 140 POTASSIUM mmol/L 3 3* 3 1*   CHLORIDE mmol/L 113* 107   CO2 mmol/L 22 23   ANION GAP mmol/L 11 10   BUN mg/dL 10 13   CREATININE mg/dL 0 78 0 94   EGFR ml/min/1 73sq m 64 51   CALCIUM mg/dL 8 2* 8 1*     Results from last 7 days   Lab Units 07/22/22  1545   AST U/L 20   ALT U/L 10   ALK PHOS U/L 50   TOTAL PROTEIN g/dL 5 3*   ALBUMIN g/dL 3 1*   TOTAL BILIRUBIN mg/dL 0 65         Results from last 7 days   Lab Units 07/23/22  0548 07/22/22  1545   GLUCOSE RANDOM mg/dL 126 215*       Results from last 7 days   Lab Units 07/22/22  1545   CK TOTAL U/L 63     Results from last 7 days   Lab Units 07/22/22  1734 07/22/22  1545   HS TNI 0HR ng/L  --  12   HS TNI 2HR ng/L 14  --    HSTNI D2 ng/L 2  --      Results from last 7 days   Lab Units 07/22/22  1545   PROCALCITONIN ng/ml 0 05     Results from last 7 days   Lab Units 07/23/22  0555 07/23/22  0215 07/23/22  0024 07/22/22  2213 07/22/22  2024 07/22/22  1952 07/22/22  1734   LACTIC ACID mmol/L 3 3* 6 3* 6 8* 8 2* 6 8* 6 5* 2 8*       Results from last 7 days   Lab Units 07/23/22  0218   CLARITY UA  Clear   COLOR UA  Yellow   SPEC GRAV UA  <=1 005   PH UA  5 5   GLUCOSE UA mg/dl Trace*   KETONES UA mg/dl Negative   BLOOD UA  Negative   PROTEIN UA mg/dl Negative   NITRITE UA  Negative   BILIRUBIN UA  Negative   UROBILINOGEN UA E U /dl 0 2   LEUKOCYTES UA  Negative       ED Treatment:   Medication Administration from 07/22/2022 1528 to 07/22/2022 2147       Date/Time Order Dose Route Action     07/22/2022 1535 ipratropium-albuterol (FOR EMS ONLY) (DUO-NEB) 0 5-2 5 mg/3 mL inhalation solution 3 mL   Given to EMS     07/22/2022 1535 albuterol (FOR EMS ONLY) (2 5 mg/3 mL) 0 083 % inhalation solution 2 5 mg   Given to EMS     07/22/2022 1550 sodium chloride 0 9 % bolus 500 mL 500 mL Intravenous New Bag     07/22/2022 2013 ipratropium-albuterol (DUO-NEB) 0 5-2 5 mg/3 mL inhalation solution 3 mL 3 mL Nebulization Given     07/22/2022 1602 ipratropium-albuterol (DUO-NEB) 0 5-2 5 mg/3 mL inhalation solution 3 mL 3 mL Nebulization Given     07/22/2022 1600 methylPREDNISolone sodium succinate (Solu-MEDROL) injection 125 mg 125 mg Intravenous Given     07/22/2022 1911 sodium chloride 0 9 % bolus 1,000 mL 1,000 mL Intravenous New Bag        Past Medical History:   Diagnosis Date    Arthritis     Asthma     [place] ([place] [address] )     Chondrocalcinosis     Chronic sciatica     Dementia ([place] [address] )     Diverticulosis     GERD (gastroesophageal reflux disease)     Gout     High blood pressure     History of low potassium     Hyperlipidemia     Insomnia     Iron deficiency anemia     Low vitamin D level     Myocardial infarction (HCC)     Neuropathy     Stage 3a chronic kidney disease ([place] [address] ) 11/16/2021    Uterine cancer ([place] [address] )      Present on Admission:   Stage 3a chronic kidney disease ([place] [address] )   Essential hypertension   Hypokalemia   Lactic acidosis   General weakness   Continuous opioid dependence ([place] [address] )   Asthma   Alzheimer's disease, early onset ([place] [address] )      Admitting Diagnosis:     Lactic acidosis [E87 2]  SOB (shortness of breath) [R06 02]  Near syncope [R55]  Heat exhaustion, initial encounter [T67  5XXA]    Age/Sex: 80 y o  female    Scheduled Medications:    aspirin, 81 mg, Oral, Daily  cefTRIAXone, 1,000 mg, Intravenous, Q24H  fluticasone-vilanterol, 1 puff, Inhalation, Daily  gabapentin, 100 mg, Oral, TID  heparin (porcine), 5,000 Units, Subcutaneous, Q8H HARPAL  potassium chloride, 40 mEq, Oral, Once  sterile water, , ,       Continuous IV Infusions:  multi-electrolyte, 125 mL/hr, Intravenous, Continuous      PRN Meds:  acetaminophen, 650 mg, Oral, Q4H PRN  albuterol, 2 puff, Inhalation, Q4H PRN  haloperidol lactate, 1 mg, Intramuscular, Q6H PRN  ondansetron, 4 mg, Intravenous, Q6H PRN  traMADol, 50 mg, Oral, Q6H PRN        IP CONSULT TO CASE MANAGEMENT  IP CONSULT TO PSYCHIATRY    Network Utilization Review Department  ATTENTION: Please call with any questions or concerns to [phone] and carefully listen to the prompts so that you are directed to the right person  All voicemails are confidential   Angelina Saucedo all requests for admission clinical reviews, approved or denied determinations and any other requests to dedicated fax number below belonging to the campus where the patient is receiving treatment   List of dedicated fax numbers for the Facilities:  [address] DENIALS (Administrative/Medical Necessity) [phone]   [address] (Maternity/NICU/Pediatrics) [phone]   [address] [place] [address]  [address] [facility] [place] [phone] [address] John[address] Lopez [address] [place] [address] [phone]

## 2022-07-23 NOTE — ASSESSMENT & PLAN NOTE
Lab Results   Component Value Date    EGFR 64 07/23/2022    EGFR 51 07/22/2022    EGFR 56 07/05/2022    CREATININE 0 78 07/23/2022    CREATININE 0 94 07/22/2022    CREATININE 0 87 07/05/2022   · Creatinine at baseline  · Avoid hypotension and nephrotoxic agents  · Trend BMP

## 2022-07-23 NOTE — PROGRESS NOTES
Ashley 45  Progress Note - [facility] 7/4/1927, 80 y o  female MRN: [id number]  Unit/Bed#: -[facility] Encounter: [id number]  Primary Care Provider: Nydia Rider MD   Date and time admitted to hospital: 7/22/2022  3:28 PM    Hypokalemia  Assessment & Plan  · K 3 1  · Status post repletion in the ED  · Trend BMP  · Replete as necessary    Stage 3a chronic kidney disease [facility])  Assessment & Plan  Lab Results   Component Value Date    EGFR 64 07/23/2022    EGFR 51 07/22/2022    EGFR 56 07/05/2022    CREATININE 0 78 07/23/2022    CREATININE 0 94 07/22/2022    CREATININE 0 87 07/05/2022   · Creatinine at baseline  · Avoid hypotension and nephrotoxic agents  · Trend BMP    Continuous opioid dependence ([address] )  Assessment & Plan  · Continue home tramadol    Alzheimer's disease, early onset ([address] )  Assessment & Plan  · Supportive care    Asthma  Assessment & Plan  · Initially hypoxic at home however resolved with DuoNebs in the ED  · Saturating well on room air  · Continue home inhalers  · Respiratory protocol    Essential hypertension  Assessment & Plan  · Normotensive on presentation  · Hold antihypertensives at this time in the setting of normotension and lactic acidosis  · Monitor blood pressures    General weakness  Assessment & Plan  · Initially presents following a syncopal episode  · Likely heat exhaustion which has since resolved  · Will consult PT/OT  · Up with assistance    * Lactic acidosis  Assessment & Plan  · Unclear etiology  · Patient received 1 L of normal saline solution in the ED with mild improvement of lactate from 2 9-2 8 however after another L of IV fluids lactate up to 6 5 and now 6 8  · No signs of active infection  · Patient not hypotensive  · Will hydrate with Isolyte at 125 cc/hour  · Trend lactate until normalized  · Continue to monitor for signs of active infection            Subjective/Objective     Subjective:   Pt is confused and in 2 point restraint, pt has episode of aspiration ,and vitals are stable , saturating above 90 % , pt will be npo     Objective:  Vitals: Blood pressure 131/62, pulse 81, temperature (!) 96 2 °F (35 7 °C), temperature source Tympanic, resp  rate 16, height 5' (1 524 m), weight 60 4 kg (133 lb 2 5 oz), SpO2 96 %  ,Body mass index is 26 01 kg/m²  Intake/Output Summary (Last 24 hours) at 7/23/2022 1321  Last data filed at 7/23/2022 0601  Gross per 24 hour   Intake 2800 ml   Output 1050 ml   Net 1750 ml       Invasive Devices  Report    Peripheral Intravenous Line  Duration           Peripheral IV 07/22/22 Dorsal (posterior); Right Forearm <1 day    Peripheral IV 07/23/22 Left;Proximal;Ventral (anterior) Forearm <1 day                Physical Exam:   HEENT-PERRLA, moist oral mucosa  Neck-supple, no JVD elevation   Respiratory-equal air entry bilaterally, no rales or rhonchi  Cardiovascular system-S1, S2 heard, no murmur or gallops or rubs  Abdomen-soft, nontender, no guarding or rigidity, bowel sounds heard  Extremities-no pedal edema  Peripheral pulses palpable  Musculoskeletal-no contractures  Central nervous system-no acute focal neurological deficit ,no sensory or motor deficit noted  Skin-no rash noted        Lab, Imaging and other studies: I have personally reviewed pertinent reports      VTE Pharmacologic Prophylaxis: Heparin  VTE Mechanical Prophylaxis: sequential compression device

## 2022-07-23 NOTE — ED NOTES
Daughter Pinkey Sample called inquiring about pt's status, RN updated daughter on pt's status  Daughter asked to be call for any changes on pt's status       Kath Cedillo RN  07/22/22 2036

## 2022-07-23 NOTE — ASSESSMENT & PLAN NOTE
· Initially hypoxic at home however resolved with DuoNebs in the ED  · Saturating well on room air  · Continue home inhalers  · Respiratory protocol

## 2022-07-23 NOTE — SPEECH THERAPY NOTE
Speech-Language Pathology Bedside Swallow Evaluation      Patient Name: Soha Oh    MVLQA'X Date: 7/23/2022     Problem List  Principal Problem:    Lactic acidosis  Active Problems:    General weakness    Essential hypertension    Asthma    Al[facility]'s disease, early onset ([facility] )    Continuous opioid dependence ([facility] )    Stage 3a chronic kidney disease ([facility] )    Hypokalemia    Aspiration into airway    Encephalopathy      Past Medical History  Past Medical History:   Diagnosis Date    Arthritis     Asthma     Cancer ([facility] )     Chondrocalcinosis     Chronic sciatica     Dementia ([facility] )     Diverticulosis     GERD (gastroesophageal reflux disease)     Gout     High blood pressure     History of low potassium     Hyperlipidemia     Insomnia     Iron deficiency anemia     Low vitamin D level     Myocardial infarction ([facility] )     Neuropathy     Stage 3a chronic kidney disease ([facility] ) 11/16/2021    Uterine cancer ([facility] )        Past Surgical History  Past Surgical History:   Procedure Laterality Date    APPENDECTOMY      CATARACT EXTRACTION Right     COLONOSCOPY      2012?  EGD  04/2019    upper- Hiatal hernia    ESOPHAGOGASTRODUODENOSCOPY      HYSTERECTOMY      TONSILLECTOMY         Summary   Pt presented with significantly AMS with subsequent risk for aspiration  No significant overt symptoms of dysphagia or aspiration were present with pureed food or nectar thickened liquid at this time  Recommended Diet: puree/level 1 diet and nectar thick liquids   Recommended Form of Meds: crushed with puree   Aspiration precautions and swallowing strategies: upright posture, only feed when fully alert, slow rate of feeding and small bites/sips  Other Recommendations: Continue frequent oral care (if/as pt allows)        Current Medical Status  Soha Oh is a 81 yo F c PMH significant for hypertension, stage IIIA CKD, asthma, dementia who presents following a syncopal episode    The patient states that she was out in the heat earlier today when she began to feel lightheaded and short of breath  Her daughter called EMS  She was found be saturating at 85% on room air and was treated with nebulizers in the ED which resulted in improvement of her respiratory status  She has no complaints at this time and the ED staff was attempted to discharge the patient however patient's lactic acidosis slowly worsened from 2 5-6 8  Unclear etiology as there are no signs of active infection or hypotension  +Hypokalemia  She received IV fluid hydration in the ED without improvement of her lactic acidosis  She was assigned to observation in the setting of lactic acidosis of unclear etiology      At lunch, she presented with s/s aspiration  The daughter was reportedly feeding patient while patient was lying down  Patient was made NPO and SLP swallowing evaluation ordered at this time  Current Precautions:  Fall & Aspiration    Allergies:  No known food allergies    Past medical history:  Please see H&P for details    Special Studies:  7/23 CXR: pending  7/22 CXR: No acute cardiopulmonary disease    Social/Education/Vocational Hx:  Pt lives with child/ramiro by report    Swallow Information   Current Risks for Dysphagia & Aspiration: AMS  Current Symptoms/Concerns: choking incident  Current Diet: NPO   Baseline Diet: regular diet and thin liquids      Baseline Assessment   Behavior/Cognition: lethargic and decreased attention  Speech/Language Status: able to follow commands inconsistently and verbal output (excess amt) of confused content  Patient Positioning: upright in bed  Pain Status/Interventions/Response to Interventions:   No report of or nonverbal indications of pain         Swallow Mechanism Exam  Facial: symmetrical  Lingual: unable to test fully 2/2 limited command following but midline with protrusion  Velum: unable to visualize  Mandible: adequate ROM  Dentition: adequate  Vocal quality:clear/adequate Respiratory Status: on RA      Consistencies Assessed and Performance   Materials administered included puree, 1 bite mechanical soft, nectar thick and thin liquid    Oral Stage: Impaired  Mastication was significantly prolonged c small bite of meatloaf  Bolus formation and transfer were functional with puree but impaired with meatloaf with oral residue noted (pt eventually spat out small amount)  Strongly suspect variable oral control with thin liquid  Pharyngeal Stage: Risk for aspiration at this time 2* significant confusion, inattention  Swallow Mechanics:  Swallowing initiation appeared prompt with liquids to begin but significant cough noted on final sip of the 3ozs of thin liquid -given after puree (suspect reduced oral control and aspiration)  No coughing, throat clearing, change in vocal quality or respiratory status noted today with 2 oz of puree, 2 of ice cream and 2 of nectar thick liquid  Esophageal Concerns: h/o GERD and hiatal hernia    Strategies and Efficacy: pt unable to apply    Summary and Recommendations (see above)    Results Reviewed with: RN and MD     Treatment Recommended: yes     Frequency of treatment: 3-5x weekly as medically and clinically indicated    Patient Stated Goal:  Unable to state at this time    Dysphagia LTG  -Patient will demonstrate safe and effective oral intake (without overt s/s significant oral/pharyngeal dysphagia including s/s penetration or aspiration) for the highest appropriate diet level       Short Term Goals:  -Pt will tolerate Dysphagia 1/pureed diet and nectar thick liquid with no significant s/s oral or pharyngeal dysphagia across 1-3 diagnostic session/s    -Patient will tolerate trials of upgraded food and/or liquid texture with no significant s/s of oral or pharyngeal dysphagia including aspiration across 1-3 diagnostic sessions     -Patients caregivers will demonstrate understanding, recall and use of recommended diet , aspiration precautions and (prompting for use of) compensatory strategies       Fernando Valdovinos [facility] , [facility] 1980 [phone]

## 2022-07-23 NOTE — PROGRESS NOTES
Patient endorses urinary frequency and dysuria over the course of the past 3 days  Has been treated in the outpatient setting for UTI with ciprofloxacin however daughter states it is difficult to does this medication due to her sleep schedule  Will check urinalysis and initiate IV ceftriaxone while inpatient  Daughter Cindy Watt brought up to par via telephone

## 2022-07-23 NOTE — PLAN OF CARE
Problem: MOBILITY - ADULT  Goal: Maintain or return to baseline ADL function  Description: INTERVENTIONS:  -  Assess patient's ability to carry out ADLs; assess patient's baseline for ADL function and identify physical deficits which impact ability to perform ADLs (bathing, care of mouth/teeth, toileting, grooming, dressing, etc )  - Assess/evaluate cause of self-care deficits   - Assess range of motion  - Assess patient's mobility; develop plan if impaired  - Assess patient's need for assistive devices and provide as appropriate  - Encourage maximum independence but intervene and supervise when necessary  - Involve family in performance of ADLs  - Assess for home care needs following discharge   - Consider OT consult to assist with ADL evaluation and planning for discharge  - Provide patient education as appropriate  Outcome: Progressing  Goal: Maintains/Returns to pre admission functional level  Description: INTERVENTIONS:  - Perform BMAT or MOVE assessment daily    - Set and communicate daily mobility goal to care team and patient/family/caregiver  - Collaborate with rehabilitation services on mobility goals if consulted  - Perform Range of Motion 3 times a day    - Dangle patient  3 times a day  - Stand patient 3  times a day  - Ambulate patient  3  times a day  - Out of bed to chair  3  times a day   - Out of bed for meals 3  times a day  - Out of bed for toileting  - Record patient progress and toleration of activity level   Outcome: Progressing     Problem: Prexisting or High Potential for Compromised Skin Integrity  Goal: Skin integrity is maintained or improved  Description: INTERVENTIONS:  - Identify patients at risk for skin breakdown  - Assess and monitor skin integrity  - Assess and monitor nutrition and hydration status  - Monitor labs   - Assess for incontinence   - Turn and reposition patient  - Assist with mobility/ambulation  - Relieve pressure over bony prominences  - Avoid friction and shearing  - Provide appropriate hygiene as needed including keeping skin clean and dry  - Evaluate need for skin moisturizer/barrier cream  - Collaborate with interdisciplinary team   - Patient/family teaching  Outcome: Progressing     Problem: PAIN - ADULT  Goal: Verbalizes/displays adequate comfort level or baseline comfort level  Description: Interventions:  - Encourage patient to monitor pain and request assistance  - Assess pain using appropriate pain scale  - Administer analgesics based on type and severity of pain and evaluate response  - Implement non-pharmacological measures as appropriate and evaluate response  - Consider cultural and social influences on pain and pain management  - Notify physician/advanced practitioner if interventions unsuccessful or patient reports new pain  Outcome: Progressing     Problem: INFECTION - ADULT  Goal: Absence or prevention of progression during hospitalization  Description: INTERVENTIONS:  - Assess and monitor for signs and symptoms of infection  - Monitor lab/diagnostic results  - Administer medications as ordered  - Instruct and encourage patient and family to use good hand hygiene technique  Outcome: Progressing  Goal: Absence of fever/infection during neutropenic period  Description: INTERVENTIONS:  - Monitor WBC    Outcome: Progressing     Problem: SAFETY ADULT  Goal: Maintain or return to baseline ADL function  Description: INTERVENTIONS:  -  Assess patient's ability to carry out ADLs; assess patient's baseline for ADL function and identify physical deficits which impact ability to perform ADLs (bathing, care of mouth/teeth, toileting, grooming, dressing, etc )  - Assess/evaluate cause of self-care deficits   - Assess range of motion  - Assess patient's mobility; develop plan if impaired  - Assess patient's need for assistive devices and provide as appropriate  - Encourage maximum independence but intervene and supervise when necessary  - Involve family in performance of ADLs  - Assess for home care needs following discharge   - Consider OT consult to assist with ADL evaluation and planning for discharge  - Provide patient education as appropriate  Outcome: Progressing  Goal: Maintains/Returns to pre admission functional level  Description: INTERVENTIONS:  - Perform BMAT or MOVE assessment daily    - Set and communicate daily mobility goal to care team and patient/family/caregiver  - Collaborate with rehabilitation services on mobility goals if consulted  - Perform Range of Motion 3 times a day    - Dangle patient 3 times a day  - Stand patient  3  times a day  - Ambulate patient  3  times a day  - Out of bed to chair 3 times a day   - Out of bed for meals  3  times a day  - Out of bed for toileting  - Record patient progress and toleration of activity level   Outcome: Progressing  Goal: Patient will remain free of falls  Description: INTERVENTIONS:  - Educate patient/family on patient safety including physical limitations  - Instruct patient to call for assistance with activity   - Consult OT/PT to assist with strengthening/mobility   - Keep Call bell within reach  - Keep bed low and locked with side rails adjusted as appropriate  - Keep care items and personal belongings within reach  - Initiate and maintain comfort rounds  - Make Fall Risk Sign visible to staff  - Offer Toileting every 2  Hours, in advance of need  - Initiate/Maintain  bed and chair alarm  - Obtain necessary fall risk management equipment  - Apply yellow socks and bracelet for high fall risk patients  - Consider moving patient to room near nurses station  Outcome: Progressing     Problem: DISCHARGE PLANNING  Goal: Discharge to home or other facility with appropriate resources  Description: INTERVENTIONS:  - Identify barriers to discharge w/patient and caregiver  - Arrange for needed discharge resources and transportation as appropriate  - Identify discharge learning needs (meds, wound care, etc )  - Arrange for interpretive services to assist at discharge as needed  - Refer to Case Management Department for coordinating discharge planning if the patient needs post-hospital services based on physician/advanced practitioner order or complex needs related to functional status, cognitive ability, or social support system  Outcome: Progressing     Problem: Knowledge Deficit  Goal: Patient/family/caregiver demonstrates understanding of disease process, treatment plan, medications, and discharge instructions  Description: Complete learning assessment and assess knowledge base    Interventions:  - Provide teaching at level of understanding  - Provide teaching via preferred learning methods  Outcome: Progressing

## 2022-07-23 NOTE — ASSESSMENT & PLAN NOTE
· Normotensive on presentation  · Hold antihypertensives at this time in the setting of normotension and lactic acidosis  · Monitor blood pressures

## 2022-07-23 NOTE — H&P
Ashley 45  H&P- Bin Madison 7/4/1927, 80 y o  female MRN: [id number]  Unit/Bed#: [facility] Encounter: [id number]  Primary Care Provider: Praful Bowles MD   Date and time admitted to hospital: 7/22/2022  3:28 PM    * Lactic acidosis  Assessment & Plan  · Unclear etiology  · Patient received 1 L of normal saline solution in the ED with mild improvement of lactate from 2 9-2 8 however after another L of IV fluids lactate up to 6 5 and now 6 8  · No signs of active infection  · Patient not hypotensive  · Will hydrate with Isolyte at 125 cc/hour  · Trend lactate until normalized  · Continue to monitor for signs of active infection    Essential hypertension  Assessment & Plan  · Normotensive on presentation  · Hold antihypertensives at this time in the setting of normotension and lactic acidosis  · Monitor blood pressures    Stage 3a chronic kidney disease [facility])  Assessment & Plan  Lab Results   Component Value Date    EGFR 51 07/22/2022    EGFR 56 07/05/2022    EGFR 58 06/10/2022    CREATININE 0 94 07/22/2022    CREATININE 0 87 07/05/2022    CREATININE 0 85 06/10/2022   · Creatinine at baseline  · Avoid hypotension and nephrotoxic agents  · Trend BMP    Hypokalemia  Assessment & Plan  · K 3 1  · Status post repletion in the ED  · Trend BMP  · Replete as necessary    General weakness  Assessment & Plan  · Initially presents following a syncopal episode  · Likely heat exhaustion which has since resolved  · Will consult PT/OT  · Up with assistance    Continuous opioid dependence ([facility] [address] )  Assessment & Plan  · Continue home tramadol    Asthma  Assessment & Plan  · Initially hypoxic at home however resolved with DuoNebs in the ED  · Saturating well on room air  · Continue home inhalers  · Respiratory protocol    Alzheimer's disease, early onset ([address] )  Assessment & Plan  · Supportive care    VTE Prophylaxis:  Heparin  Code Status: Level 3 DNR/DNI    Anticipated Length of Stay:  Patient will be admitted on an Observation basis with an anticipated length of stay of  2 midnights  Justification for Hospital Stay:  Lactic acidosis    Total Time for Visit, including Counseling / Coordination of Care: 60 minutes  Greater than 50% of this total time spent on direct patient counseling and coordination of care  Chief Complaint: Syncope      History of Present Illness:    Bin Lombardo is a 80 y o  female with a past medical history significant for hypertension, stage IIIA CKD, asthma, dementia who presents following a syncopal episode  The patient states that she was out in the heat earlier today when she began to feel lightheaded  She also stated that she felt short of breath  Her daughter called EMS  She was found be saturating at 85% on room air and was treated with nebulizers in the ED which resulted in improvement of her respiratory status  She is now saturating at 100% on room air  She has no complaints at this time and the ED staff was attempted to discharge the patient however patient's lactic acidosis slowly worsened from 2 5-6 8  Unclear etiology as there are no signs of active infection or hypotension  She received IV fluid hydration in the ED without improvement of her lactic acidosis  She was assigned to observation in the setting of lactic acidosis of unclear etiology  Review of Systems:    Review of Systems   Constitutional: Negative for chills and fever  HENT: Negative for ear pain and sore throat  Eyes: Negative for pain and visual disturbance  Respiratory: Negative for cough and shortness of breath  Cardiovascular: Negative for chest pain and palpitations  Gastrointestinal: Negative for abdominal pain and vomiting  Genitourinary: Negative for dysuria and hematuria  Musculoskeletal: Negative for arthralgias and back pain  Skin: Negative for color change and rash  Neurological: Negative for seizures and syncope  All other systems reviewed and are negative        Past Medical and Surgical History:     Past Medical History:   Diagnosis Date    Arthritis     Asthma     Cancer ([facility] )     Chondrocalcinosis     Chronic sciatica     Dementia (HCC)     Diverticulosis     GERD (gastroesophageal reflux disease)     Gout     High blood pressure     History of low potassium     Hyperlipidemia     Insomnia     Iron deficiency anemia     Low vitamin D level     Myocardial infarction ([place] [address] )     Neuropathy     Stage 3a chronic kidney disease ([place] [address] ) 11/16/2021    Uterine cancer ([place] [address] )        Past Surgical History:   Procedure Laterality Date    APPENDECTOMY      CATARACT EXTRACTION Right     COLONOSCOPY      2012?  EGD  04/2019    upper- Hiatal hernia    ESOPHAGOGASTRODUODENOSCOPY      HYSTERECTOMY      TONSILLECTOMY         Meds/Allergies:    Prior to Admission medications    Medication Sig Start Date End Date Taking? Authorizing Provider   acetaminophen (TYLENOL) 325 mg tablet Take 2 tablets (650 mg total) by mouth every 6 (six) hours as needed for mild pain, fever or headaches 6/10/22   MARIO Esteban   aspirin (ECOTRIN LOW STRENGTH) 81 mg EC tablet Take 1 tablet (81 mg total) by mouth daily 6/11/22   MARIO Esteban   ciprofloxacin (CIPRO) 500 mg tablet Take 1 tablet (500 mg total) by mouth every 12 (twelve) hours for 7 days 7/15/22 7/22/22  Brain MD Tabitha   cyanocobalamin (VITAMIN B-12) 1,000 mcg tablet Take 1,000 mcg by mouth daily  Historical Provider, MD   fluticasone-salmeterol (Advair) 100-50 mcg/dose inhaler Inhale 1 puff 2 (two) times a day Rinse mouth after use      Historical Provider, MD   gabapentin (NEURONTIN) 100 mg capsule Take 100 mg by mouth 3 (three) times a day    Historical Provider, MD   multivitamin-iron-minerals-folic acid (THERAPEUTIC-M) TABS tablet Take 1 tablet by mouth daily 6/30/22 7/30/22  MARIO Khan   pravastatin (PRAVACHOL) 10 mg tablet TAKE ONE TABLET BY MOUTH EVERY DAY  Patient not taking: No sig reported 10/4/21 Oracio Valera MD   ProAir  (15 Base) MCG/ACT inhaler INHALE TWO PUFFS BY MOUTH EVERY 4 HOURS AS NEEDED 8/26/20   Oracio Valera MD   traMADol (ULTRAM) 50 mg tablet Take 50 mg by mouth every 6 (six) hours as needed for moderate pain    Historical Provider, MD       Allergies: No Known Allergies    Social History:     Marital Status:    Substance Use History:   Social History     Substance and Sexual Activity   Alcohol Use No     Social History     Tobacco Use   Smoking Status Never Smoker   Smokeless Tobacco Never Used     Social History     Substance and Sexual Activity   Drug Use No       Family History:    Pertinent family history reviewed    Physical Exam:     Vitals:   Blood Pressure: 113/70 (07/22/22 1910)  Pulse: 88 (07/22/22 1910)  Temperature: 97 5 °F (36 4 °C) (07/22/22 1536)  Temp Source: Oral (07/22/22 1536)  Respirations: 16 (07/22/22 1910)  SpO2: 100 % (07/22/22 1910)    Physical Exam  Vitals and nursing note reviewed  Constitutional:       General: She is not in acute distress  Appearance: She is well-developed  HENT:      Head: Normocephalic and atraumatic  Eyes:      Conjunctiva/sclera: Conjunctivae normal    Cardiovascular:      Rate and Rhythm: Normal rate and regular rhythm  Heart sounds: No murmur heard  Pulmonary:      Effort: Pulmonary effort is normal  No respiratory distress  Breath sounds: Normal breath sounds  Abdominal:      Palpations: Abdomen is soft  Tenderness: There is no abdominal tenderness  Musculoskeletal:      Cervical back: Neck supple  Skin:     General: Skin is warm and dry  Neurological:      Mental Status: She is alert            Additional Data:     Lab Results: I have reviewed pertinent results     Results from last 7 days   Lab Units 07/22/22  1545   WBC Thousand/uL 10 77*   HEMOGLOBIN g/dL 10 2*   HEMATOCRIT % 32 0*   PLATELETS Thousands/uL 185   NEUTROS PCT % 72   LYMPHS PCT % 23   MONOS PCT % 3*   EOS PCT % 1     Results from last 7 days   Lab Units 07/22/22  1545   SODIUM mmol/L 140   POTASSIUM mmol/L 3 1*   CHLORIDE mmol/L 107   CO2 mmol/L 23   BUN mg/dL 13   CREATININE mg/dL 0 94   ANION GAP mmol/L 10   CALCIUM mg/dL 8 1*   ALBUMIN g/dL 3 1*   TOTAL BILIRUBIN mg/dL 0 65   ALK PHOS U/L 50   ALT U/L 10   AST U/L 20   GLUCOSE RANDOM mg/dL 215*                 Results from last 7 days   Lab Units 07/22/22 2024 07/22/22 1952 07/22/22  1734 07/22/22  1545   LACTIC ACID mmol/L 6 8* 6 5* 2 8* 2 9*   PROCALCITONIN ng/ml  --   --   --  0 05       Imaging: I have reviewed pertinent imaging     XR chest 1 view portable   ED Interpretation by Savanah Umanzor MD (07/22 1702)   Left pleural effusion, no focal consolidation          EKG, Pathology, and Other Studies Reviewed on Admission:   · EKG: Reviewed     Allscripts / Epic Records Reviewed    ** Please Note: This note has been constructed using a voice recognition system   **

## 2022-07-24 NOTE — ASSESSMENT & PLAN NOTE
Pt had episode of aspiration this afternoon,  Will keep NPO    check CXR and aspiration precautions   Cont rocephin

## 2022-07-24 NOTE — ASSESSMENT & PLAN NOTE
Lab Results   Component Value Date    EGFR 69 07/24/2022    EGFR 64 07/23/2022    EGFR 51 07/22/2022    CREATININE 0 74 07/24/2022    CREATININE 0 78 07/23/2022    CREATININE 0 94 07/22/2022   · Creatinine at baseline  · Avoid hypotension and nephrotoxic agents  · Trend BMP

## 2022-07-24 NOTE — PROGRESS NOTES
Pt is very calm and cooperative restraints removed for this time attending physician made aware  karan continue to monitor

## 2022-07-24 NOTE — UTILIZATION REVIEW
Continued Stay Review    Date:  7-24-22                           Current Patient Class:  Inpatient Current Level of Care: med surg    HPI:89 y o  female initially admitted on 7-22-22     Assessment/Plan:     Patient lethargic but easily aroused  Palliative care consulted  Soft waist restraint placed to prevent harm to self, unable to follow directions, interference for medical treatment  Potassium 3 1  Medications adjusted : seroquel started,  Start  Ultram prn, iv kcl 20 meq x1 and kdur 40 meq, prn haldol  Repeat Ekg  Due to Qtc  Temp 100 6  Wbc 11 85  continue iv ceftriaxone, nebulization tid and iv fluids 75/hr  Intermittent straight cath today 450 ml    Post void 12ml          Vital Signs:       Date/Time Temp Pulse Resp BP MAP (mmHg) SpO2 O2 Device   07/24/22 1344 -- -- -- -- -- 92 % None (Room air)   07/24/22 0830 97 9 °F (36 6 °C) 91 16 127/88 -- 95 % --   07/24/22 0746 -- -- -- -- -- 90 % None (Room air)   07/24/22 0130 98 8 °F (37 1 °C) 84 -- 145/62 -- -- --   07/24/22 0100 100 6 °F (38 1 °C)   Abnormal  85 18 154/67 104 92 % None (Room air)               Pertinent Labs/Diagnostic Results:       Results from last 7 days   Lab Units 07/24/22  1015 07/23/22  0548 07/22/22  2213 07/22/22  1545   WBC Thousand/uL 11 85* 9 52  --  10 77*   HEMOGLOBIN g/dL 9 9* 9 4*  --  10 2*   HEMATOCRIT % 32 5* 29 1*  --  32 0*   PLATELETS Thousands/uL 157 166 159 185   NEUTROS ABS Thousands/µL  --  8 84*  --  7 83*         Results from last 7 days   Lab Units 07/24/22  1004 07/23/22  0548 07/22/22  1545   SODIUM mmol/L 144 146 140   POTASSIUM mmol/L 3 1* 3 3* 3 1*   CHLORIDE mmol/L 112* 113* 107   CO2 mmol/L 22 22 23   ANION GAP mmol/L 10 11 10   BUN mg/dL 10 10 13   CREATININE mg/dL 0 74 0 78 0 94   EGFR ml/min/1 73sq m 69 64 51   CALCIUM mg/dL 7 9* 8 2* 8 1*   MAGNESIUM mg/dL 2 2  --   --      Results from last 7 days   Lab Units 07/22/22  1545   AST U/L 20   ALT U/L 10   ALK PHOS U/L 50   TOTAL PROTEIN g/dL 5 3*   ALBUMIN g/dL 3 1*   TOTAL BILIRUBIN mg/dL 0 65         Results from last 7 days   Lab Units 07/24/22  1004 07/23/22  0548 07/22/22  1545   GLUCOSE RANDOM mg/dL 163* 126 215*       Results from last 7 days   Lab Units 07/22/22  1545   CK TOTAL U/L 63     Results from last 7 days   Lab Units 07/22/22  1734 07/22/22  1545   HS TNI 0HR ng/L  --  12   HS TNI 2HR ng/L 14  --    HSTNI D2 ng/L 2  --          Results from last 7 days   Lab Units 07/22/22  1545   PROCALCITONIN ng/ml 0 05     Results from last 7 days   Lab Units 07/23/22  0555 07/23/22  0215 07/23/22  0024 07/22/22  2213 07/22/22  2024   LACTIC ACID mmol/L 3 3* 6 3* 6 8* 8 2* 6 8*       Results from last 7 days   Lab Units 07/23/22  0218   CLARITY UA  Clear   COLOR UA  Yellow   SPEC GRAV UA  <=1 005   PH UA  5 5   GLUCOSE UA mg/dl Trace*   KETONES UA mg/dl Negative   BLOOD UA  Negative   PROTEIN UA mg/dl Negative   NITRITE UA  Negative   BILIRUBIN UA  Negative   UROBILINOGEN UA E U /dl 0 2   LEUKOCYTES UA  Negative       Scheduled Medications:    aspirin, 81 mg, Oral, Daily  cefTRIAXone, 1,000 mg, Intravenous, Q24H  fluticasone-vilanterol, 1 puff, Inhalation, Daily  gabapentin, 100 mg, Oral, TID  heparin (porcine), 5,000 Units, Subcutaneous, Q8H HARPAL  ipratropium, 0 5 mg, Nebulization, TID  levalbuterol, 1 25 mg, Nebulization, TID  potassium chloride, 40 mEq, Oral, Once  QUEtiapine, 12 5 mg, Oral, HS      Continuous IV Infusions:  multi-electrolyte, 75 mL/hr, Intravenous, Continuous      PRN Meds:  acetaminophen, 650 mg, Oral, Q4H PRN  albuterol, 2 puff, Inhalation, Q4H PRN  haloperidol lactate, 1 mg, Intramuscular, Q6H PRN  ondansetron, 4 mg, Intravenous, Q6H PRN  traMADol, 50 mg, Oral, Q8H PRN        Discharge Plan: to be determined     Network Utilization Review Department  ATTENTION: Please call with any questions or concerns to [phone] and carefully listen to the prompts so that you are directed to the right person   All voicemails are confidential   Farrel Bodily all requests for admission clinical reviews, approved or denied determinations and any other requests to dedicated fax number below belonging to the campus where the patient is receiving treatment   List of dedicated fax numbers for the Facilities:  Alison Form DENIALS (Administrative/Medical Necessity) [phone]   [address]  (Maternity/NICU/Pediatrics) [phone]   [address]  [address] [facility] [place] [id number] [place] [address] [address] [facility] [facility] [phone]

## 2022-07-24 NOTE — PLAN OF CARE
Problem: MOBILITY - ADULT  Goal: Maintain or return to baseline ADL function  Description: INTERVENTIONS:  -  Assess patient's ability to carry out ADLs; assess patient's baseline for ADL function and identify physical deficits which impact ability to perform ADLs (bathing, care of mouth/teeth, toileting, grooming, dressing, etc )  - Assess/evaluate cause of self-care deficits   - Assess range of motion  - Assess patient's mobility; develop plan if impaired  - Assess patient's need for assistive devices and provide as appropriate  - Encourage maximum independence but intervene and supervise when necessary  - Involve family in performance of ADLs  - Assess for home care needs following discharge   - Consider OT consult to assist with ADL evaluation and planning for discharge  - Provide patient education as appropriate  Outcome: Progressing  Goal: Maintains/Returns to pre admission functional level  Description: INTERVENTIONS:  - Perform BMAT or MOVE assessment daily    - Set and communicate daily mobility goal to care team and patient/family/caregiver  - Collaborate with rehabilitation services on mobility goals if consulted  - Perform Range of Motion 3  times a day  - Reposition patient every 2  hours    - Dangle patient 3  times a day  - Stand patient 3  times a day  - Out of bed to chair 3  times a day   - Out of bed for meals 3 times a day  - Out of bed for toileting  - Record patient progress and toleration of activity level   Outcome: Progressing     Problem: Prexisting or High Potential for Compromised Skin Integrity  Goal: Skin integrity is maintained or improved  Description: INTERVENTIONS:  - Identify patients at risk for skin breakdown  - Assess and monitor skin integrity  - Assess and monitor nutrition and hydration status  - Monitor labs   - Assess for incontinence   - Turn and reposition patient  - Assist with mobility/ambulation  - Relieve pressure over bony prominences  - Avoid friction and shearing  - Provide appropriate hygiene as needed including keeping skin clean and dry  - Evaluate need for skin moisturizer/barrier cream  - Collaborate with interdisciplinary team   - Patient/family teaching  Outcome: Progressing     Problem: PAIN - ADULT  Goal: Verbalizes/displays adequate comfort level or baseline comfort level  Description: Interventions:  - Encourage patient to monitor pain and request assistance  - Assess pain using appropriate pain scale  - Administer analgesics based on type and severity of pain and evaluate response  - Implement non-pharmacological measures as appropriate and evaluate response  - Consider cultural and social influences on pain and pain management  - Notify physician/advanced practitioner if interventions unsuccessful or patient reports new pain  Outcome: Progressing     Problem: INFECTION - ADULT  Goal: Absence or prevention of progression during hospitalization  Description: INTERVENTIONS:  - Assess and monitor for signs and symptoms of infection  - Monitor lab/diagnostic results  - Administer medications as ordered  - Instruct and encourage patient and family to use good hand hygiene technique  Outcome: Progressing  Goal: Absence of fever/infection during neutropenic period  Description: INTERVENTIONS:  - Monitor WBC    Outcome: Progressing     Problem: SAFETY ADULT  Goal: Maintain or return to baseline ADL function  Description: INTERVENTIONS:  -  Assess patient's ability to carry out ADLs; assess patient's baseline for ADL function and identify physical deficits which impact ability to perform ADLs (bathing, care of mouth/teeth, toileting, grooming, dressing, etc )  - Assess/evaluate cause of self-care deficits   - Assess range of motion  - Assess patient's mobility; develop plan if impaired  - Assess patient's need for assistive devices and provide as appropriate  - Encourage maximum independence but intervene and supervise when necessary  - Involve family in performance of ADLs  - Assess for home care needs following discharge   - Consider OT consult to assist with ADL evaluation and planning for discharge  - Provide patient education as appropriate  Outcome: Progressing  Goal: Maintains/Returns to pre admission functional level  Description: INTERVENTIONS:  - Perform BMAT or MOVE assessment daily    - Set and communicate daily mobility goal to care team and patient/family/caregiver  - Collaborate with rehabilitation services on mobility goals if consulted  - Perform Range of Motion 3  times a day  - Reposition patient every  2  hours    - Dangle patient 3  times a day  - Stand patient 3 times a day  - Out of bed to chair 3  times a day   - Out of bed for meals 3  times a day  - Out of bed for toileting  - Record patient progress and toleration of activity level   Outcome: Progressing  Goal: Patient will remain free of falls  Description: INTERVENTIONS:  - Educate patient/family on patient safety including physical limitations  - Instruct patient to call for assistance with activity   - Consult OT/PT to assist with strengthening/mobility   - Keep Call bell within reach  - Keep bed low and locked with side rails adjusted as appropriate  - Keep care items and personal belongings within reach  - Initiate and maintain comfort rounds  - Make Fall Risk Sign visible to staff  - Offer Toileting every  2  Hours, in advance of need  - Initiate/Maintain  bed and chair alarm  - Apply yellow socks and bracelet for high fall risk patients  - Consider moving patient to room near nurses station  Outcome: Progressing     Problem: DISCHARGE PLANNING  Goal: Discharge to home or other facility with appropriate resources  Description: INTERVENTIONS:  - Identify barriers to discharge w/patient and caregiver  - Arrange for needed discharge resources and transportation as appropriate  - Identify discharge learning needs (meds, wound care, etc )  - Arrange for interpretive services to assist at discharge as needed  - Refer to Case Management Department for coordinating discharge planning if the patient needs post-hospital services based on physician/advanced practitioner order or complex needs related to functional status, cognitive ability, or social support system  Outcome: Progressing     Problem: Knowledge Deficit  Goal: Patient/family/caregiver demonstrates understanding of disease process, treatment plan, medications, and discharge instructions  Description: Complete learning assessment and assess knowledge base    Interventions:  - Provide teaching at level of understanding  - Provide teaching via preferred learning methods  Outcome: Progressing     Problem: Potential for Falls  Goal: Patient will remain free of falls  Description: INTERVENTIONS:  - Educate patient/family on patient safety including physical limitations  - Instruct patient to call for assistance with activity   - Consult OT/PT to assist with strengthening/mobility   - Keep Call bell within reach  - Keep bed low and locked with side rails adjusted as appropriate  - Keep care items and personal belongings within reach  - Initiate and maintain comfort rounds  - Make Fall Risk Sign visible to staff  - Offer Toileting every 2  Hours, in advance of need  - Initiate/Maintain bed and chair alarm  - Apply yellow socks and bracelet for high fall risk patients  - Consider moving patient to room near nurses station  Outcome: Progressing

## 2022-07-24 NOTE — RESPIRATORY THERAPY NOTE
Pt BS clear, diminished, remains on room air with SPO2 93%  Denies SOB or distress  Pt takes Advair and PRN Albuteral MDI, will D/C scheduled bronchodilators at this time and continue with pt pts home bronchodialtor therapy

## 2022-07-24 NOTE — PROGRESS NOTES
[facility] 45  Progress Note - Vicki Carmichael 7/4/1927, 80 y o  female MRN: [id number]  Unit/Bed#: -01 Encounter: [id number]  Primary Care Provider: Jessica Thomas MD   Date and time admitted to hospital: 7/22/2022  3:28 PM    Encephalopathy  Assessment & Plan  Delirium , with underlying dementia ,   Will give haldol prn ,    Aspiration into airway  Assessment & Plan  Pt had episode of aspiration this afternoon,  Will keep NPO    check CXR and aspiration precautions   Cont rocephin      Hypokalemia  Assessment & Plan  · K 3 1  · Status post repletion in the ED  · Trend BMP  · Replete as necessary    Stage 3a chronic kidney disease [facility])  Assessment & Plan  Lab Results   Component Value Date    EGFR 69 07/24/2022    EGFR 64 07/23/2022    EGFR 51 07/22/2022    CREATININE 0 74 07/24/2022    CREATININE 0 78 07/23/2022    CREATININE 0 94 07/22/2022   · Creatinine at baseline  · Avoid hypotension and nephrotoxic agents  · Trend BMP    Continuous opioid dependence ([address] )  Assessment & Plan  · Continue home tramadol    Alzheimer's disease, early onset ([address] )  Assessment & Plan  · Supportive care    Asthma  Assessment & Plan  · Initially hypoxic at home however resolved with DuoNebs in the ED  · Saturating well on room air  · Continue home inhalers  · Respiratory protocol    Essential hypertension  Assessment & Plan  · Normotensive on presentation  · Hold antihypertensives at this time in the setting of normotension and lactic acidosis  · Monitor blood pressures    General weakness  Assessment & Plan  · Initially presents following a syncopal episode  · Likely heat exhaustion which has since resolved  · Will consult PT/OT  · Up with assistance    * Lactic acidosis  Assessment & Plan  · Unclear etiology  · Patient received 1 L of normal saline solution in the ED with mild improvement of lactate from 2 9-2 8 however after another L of IV fluids lactate up to 6 5 and now 6 8  · No signs of active infection  · Patient not hypotensive  · Will hydrate with Isolyte at 125 cc/hour  · Trend lactate until normalized  · Continue to monitor for signs of active infection            Subjective/Objective     Subjective:   Pt is sleepy, lethargic, but arousable, overnight events were reviewed  Patient is afebrile  Objective:  Vitals: Blood pressure 127/88, pulse 91, temperature 97 9 °F (36 6 °C), temperature source Tympanic, resp  rate 16, height 5' (1 524 m), weight 60 4 kg (133 lb 2 5 oz), SpO2 92 %  ,Body mass index is 26 01 kg/m²  Intake/Output Summary (Last 24 hours) at 7/24/2022 1439  Last data filed at 7/24/2022 1230  Gross per 24 hour   Intake 185 ml   Output 562 ml   Net -377 ml       Invasive Devices  Report    Peripheral Intravenous Line  Duration           Peripheral IV 07/22/22 Dorsal (posterior); Right Forearm 1 day    Peripheral IV 07/23/22 Left;Proximal;Ventral (anterior) Forearm 1 day                Physical Exam:   HEENT-PERRLA, moist oral mucosa  Neck-supple, no JVD elevation   Respiratory-equal air entry bilaterally, no rales or rhonchi  Cardiovascular system-S1, S2 heard,  Abdomen-soft, nontender, no guarding or rigidity, bowel sounds heard  Extremities-no pedal edema  Peripheral pulses palpable  Musculoskeletal-no contractures  Central nervous system-lethargic but arousable,   Skin-no rash noted        Lab, Imaging and other studies: I have personally reviewed pertinent reports      VTE Pharmacologic Prophylaxis: Heparin  VTE Mechanical Prophylaxis: sequential compression device

## 2022-07-25 NOTE — ASSESSMENT & PLAN NOTE
Lab Results   Component Value Date    EGFR 74 07/25/2022    EGFR 69 07/24/2022    EGFR 64 07/23/2022    CREATININE 0 67 07/25/2022    CREATININE 0 74 07/24/2022    CREATININE 0 78 07/23/2022   · Creatinine at baseline  · Avoid hypotension and nephrotoxic agents  · Trend BMP

## 2022-07-25 NOTE — SPEECH THERAPY NOTE
Speech/Language Pathology Progress Note    Patient Name: Zach Palacio  EGXURJUHI Date: 7/25/2022     Problem List  Principal Problem:    Lactic acidosis  Active Problems:    General weakness    Essential hypertension    Asthma    Alzheimer's disease, early onset ([facility] [address] )    Continuous opioid dependence ([facility] [address] )    Stage 3a chronic kidney disease ([facility] [address] )    Hypokalemia    Aspiration into airway    Encephalopathy    Subjective:  "My throat hurts and is scratchy on the right side" and "I don't know why I'm having trouble swallowing "    Objective:  Pt was seen for diagnostic dysphagia therapy to  ensure tolerance of current diet (puree/NTL ) and to assess potential for safe diet upgrade  Pt was alert and positioned upright  Jefferson Yoon was assessed with tsps of pudding and nectar thick juice, followed by 6 sips of thin soda and water then 2 bites of fig riley cookie followed by a sip of water  Pt mandated v slow rate, stating "I feel like I can't swallow any more" after most bites/sips  Mastication was timely and effective appearing with the 2 bites of Fig Riley  Bolus formation and transfer appeared to be grossly effective  Swallow initiation appeared prompt  Laryngeal rise suspected to be good  No immediate cough on tsps of pudding or sips of nectar juice or thin water/soda or the 1st bite of Fig Riley  Immediately after swallowing the 2nd bite of cookie, she c/o stasis/sticking and evidenced multiple swallows  She was then offered 1 sip of water to "wash down" food with significant distressful coughing resulting  Assessment:  Jefferson Yoon presented with s/s suggestive of significant pharyngo-esophageal dysphagia with soft food  Plan/Recommendations:  Continue current conservative diet as desired and tolerated  Consider VBS  Continue SLP evaluation/diagnostic tx (PS just learned of plan for Home Hospice upon d/c)

## 2022-07-25 NOTE — PROGRESS NOTES
[facility] [address]  66   Progress Note - Mikki Leung 7/4/1927, 80 y o  female MRN: [id number]  Unit/Bed#: -01 Encounter: [id number]  Primary Care Provider: Kadie Harry MD   Date and time admitted to hospital: 7/22/2022  3:28 PM    Encephalopathy  Assessment & Plan  Delirium , with underlying dementia ,   Will give haldol prn ,    Aspiration into airway  Assessment & Plan  Pt had episode of aspiration this afternoon,  Will keep NPO    check CXR and aspiration precautions   Cont rocephin      Hypokalemia  Assessment & Plan  · K 3 1  · Status post repletion in the ED  · Trend BMP  · Replete as necessary    Stage 3a chronic kidney disease [facility])  Assessment & Plan  Lab Results   Component Value Date    EGFR 74 07/25/2022    EGFR 69 07/24/2022    EGFR 64 07/23/2022    CREATININE 0 67 07/25/2022    CREATININE 0 74 07/24/2022    CREATININE 0 78 07/23/2022   · Creatinine at baseline  · Avoid hypotension and nephrotoxic agents  · Trend BMP    Continuous opioid dependence ([address] )  Assessment & Plan  · Continue home tramadol    Alzheimer's disease, early onset ([address] )  Assessment & Plan  · Supportive care    Asthma  Assessment & Plan  · Initially hypoxic at home however resolved with DuoNebs in the ED  · Saturating well on room air  · Continue home inhalers  · Respiratory protocol    Essential hypertension  Assessment & Plan  · Normotensive on presentation  · Hold antihypertensives at this time in the setting of normotension and lactic acidosis  · Monitor blood pressures    General weakness  Assessment & Plan  · Initially presents following a syncopal episode  · Likely heat exhaustion which has since resolved  · Will consult PT/OT  · Up with assistance    * Lactic acidosis  Assessment & Plan  · Unclear etiology  · Patient received 1 L of normal saline solution in the ED with mild improvement of lactate from 2 9-2 8 however after another L of IV fluids lactate up to 6 5 and now 6 8  · No signs of active infection  · Patient not hypotensive  · Will hydrate with Isolyte at 125 cc/hour  · Trend lactate until normalized  · Continue to monitor for signs of active infection        Discharge disposition-daughter wants to take her home on hospice at home on Wednesday  Subjective/Objective     Subjective:   Patient is feeling better  More awake alert  However has generalized weakness and tiredness  Patient is afebrile  Objective:  Vitals: Blood pressure 152/82, pulse 90, temperature 98 9 °F (37 2 °C), temperature source Tympanic, resp  rate 18, height 5' (1 524 m), weight 60 4 kg (133 lb 2 5 oz), SpO2 93 %  ,Body mass index is 26 01 kg/m²  Intake/Output Summary (Last 24 hours) at 7/25/2022 1928  Last data filed at 7/25/2022 0930  Gross per 24 hour   Intake 1330 ml   Output --   Net 1330 ml       Invasive Devices  Report    Peripheral Intravenous Line  Duration           Peripheral IV 07/24/22 Right Antecubital 1 day          Drain  Duration           External Urinary Catheter <1 day                Physical Exam:   HEENT-PERRLA, moist oral mucosa, frail  Neck-supple, no JVD elevation   Respiratory-equal air entry bilaterally, no rales or rhonchi  Cardiovascular system-S1, S2 heard, systolic murmur heard   Abdomen-soft, nontender, no guarding or rigidity, bowel sounds heard  Extremities-no pedal edema  Peripheral pulses palpable  Musculoskeletal-no contractures  Central nervous system-no acute focal neurological deficit ,no sensory or motor deficit noted  Skin-no rash noted        Lab, Imaging and other studies: I have personally reviewed pertinent reports      VTE Pharmacologic Prophylaxis: Heparin  VTE Mechanical Prophylaxis: sequential compression device

## 2022-07-25 NOTE — PHYSICAL THERAPY NOTE
PHYSICAL THERAPY EVALUATION NOTE          Patient Name: Mai Lomas  UZRNY'[id number] Date: 2022      AGE:   89 y o  Mrn:   [id number]  ADMIT DX:  Lactic acidosis [E87 2]  SOB (shortness of breath) [R06 02]  Near syncope [R55]  Heat exhaustion, initial encounter [T67  5XXA]    Past Medical History:  Past Medical History:   Diagnosis Date    Arthritis     Asthma     Cancer ([facility] )     Chondrocalcinosis     Chronic sciatica     Dementia ([facility] )     Diverticulosis     GERD (gastroesophageal reflux disease)     Gout     High blood pressure     History of low potassium     Hyperlipidemia     Insomnia     Iron deficiency anemia     Low vitamin D level     Myocardial infarction ([facility] )     Neuropathy     Stage 3a chronic kidney disease ([place] [address] ) 2021    Uterine cancer [facility])        Past Surgical History:  Past Surgical History:   Procedure Laterality Date    APPENDECTOMY      CATARACT EXTRACTION Right     COLONOSCOPY      ? EGD  2019    upper- Hiatal hernia    ESOPHAGOGASTRODUODENOSCOPY      HYSTERECTOMY      TONSILLECTOMY       Length Of Stay: 2        PHYSICAL THERAPY EVALUATION:    Patient's identity confirmed via 2 patient identifiers (full name and ) at start of session       22 0847   PT Last Visit   PT Visit Date 22   Note Type   Note type Evaluation   Pain Assessment   Pain Assessment Tool FLACC   Pain Location/Orientation Location: Abdomen   Pain Onset/Description Frequency: Intermittent   Hospital Pain Intervention(s) Repositioned; Ambulation/increased activity   Pain Rating: FLACC (Rest) - Face 0   Pain Rating: FLACC (Rest) - Legs 0   Pain Rating: FLACC (Rest) - Activity 0   Pain Rating: FLACC (Rest) - Cry 0   Pain Rating: FLACC (Rest) - Consolability 0   Score: FLACC (Rest) 0   Pain Rating: FLACC (Activity) - Face 0   Pain Rating: FLACC (Activity) - Legs 0   Pain Rating: FLACC (Activity) - Activity 0   Pain Rating: FLACC (Activity) - Cry 1   Pain Rating: FLACC (Activity) - Consolability 0   Score: FLACC (Activity) 1   Restrictions/Precautions   Weight Bearing Precautions Per Order No   Other Precautions Cognitive; Chair Alarm; Bed Alarm;1:1;Aspiration; Fall Risk;Pain  (virtual 1:1)   Home Living   Type of Home Mobile home;House   Home Layout One level;Performs ADLs on one level; Able to live on main level with bedroom/bathroom  (4 HENRIQUE)   Home Equipment Walker;Cane  (RW, SPC, bedside commode)   Additional Comments Pt poor/unreliable historian due to imapired cognition; home set-up and PLOF obtained from chart review  Pt lives w/ daughter in a 1 level house w/ 4 HENRIQUE   Prior Function   Level of [place] Independent with ADLs and functional mobility; Needs assistance with IADLs   Lives With Daughter   Receives Help From Family   ADL Assistance Independent   IADLs Needs assistance  (family provides transportation)   Falls in the last 6 months 0  (no h/o falls per chart review)   Comments At baseline, pt ambulates indpendently w/o AD, performs ADLs independently, requires assistance w/ IDLs   General   Family/Caregiver Present No   Cognition   Overall Cognitive Status Impaired   Arousal/Participation Cooperative   Orientation Level Oriented to person;Disoriented to place; Disoriented to time;Disoriented to situation  (oriented to name and month/day of  only, stated born in "", reports current year as "[birth year]")   Memory Decreased recall of biographical information;Decreased short term memory;Decreased recall of recent events;Decreased recall of precautions   Following Commands Follows one step commands with increased time or repetition   Comments Pt ID via name and  and verified on wristband; pt pleasantly confused throughout   Strength RLE   RLE Overall Strength 3/5  (grossly assessed w/ functional mobility)   Strength LLE   LLE Overall Strength 3/5  (grossly assessed w/ functional mobility)   Bed Mobility   Supine to Sit 4  Minimal assistance Additional items Assist x 1;HOB elevated; Bedrails; Increased time required;Verbal cues;LE management   Sit to Supine 4  Minimal assistance   Additional items Assist x 1;HOB elevated; Increased time required;Verbal cues;LE management   Additional Comments pt able to maintain sitting balance at EOB w/ close supervision  Dependent Ax2 for repositioning towards HOB at end of session   Transfers   Stand pivot 3  Moderate assistance   Additional items Assist x 1; Increased time required;Verbal cues   Sliding Board transfer 3  Moderate assistance   Additional items Assist x 1; Increased time required;Verbal cues   Toilet transfer 3  Moderate assistance  (sit<>stand transfer to allow for smita-care)   Additional items Assist x 1; Increased time required;Commode  (VC for use of armrest)   Additional Comments pt performed stand-pivot EOB<>commode and and additional sit<>stand transfer from commode  All transfers performed w/ mod Ax1  Ambulation/Elevation   Gait pattern Not appropriate; Not tested   Ambulation/Elevation Additional Comments ambulation not tested due to pt reported fatigue and limited standing tolerance   Balance   Static Sitting Fair -   Dynamic Sitting Poor +   Static Standing Poor   Dynamic Standing Poor   Ambulatory   (not assessed)   Endurance Deficit   Endurance Deficit Yes   Endurance Deficit Description limited activity tolerance and decreased overall tolerance to functional mobility   Activity Tolerance   Activity Tolerance Patient limited by fatigue   Nurse Made Aware MÓNICA Landry   Assessment   Prognosis Fair   Problem List Decreased strength;Decreased endurance; Impaired balance;Decreased mobility; Decreased cognition; Impaired judgement;Decreased safety awareness;Pain   Assessment Mikki Leung is a 80 y o  Female who [facility] on 7/22/2022 w/ c/o SOB, diarrhea and diagnosis of lactic acidosis   Orders for PT eval and treat received, w/ activity orders of up w/ assist w/ aspiration and fall risk precautions  Comorbidities affecting pt at time of evaluation include: h/o COVID, asthma, arthritis, MI, dementia, HTN, neuropathy  Personal factors affecting DC include: inaccessible home environment, stairs to enter home, inability to ambulate household distances, inability to navigate level surfaces w/o external assistance, decreased cognition and inability to perform IADLs  At baseline, pt mobilizes independently w/ no AD, and w/ 0 fall(s) in the previous 6 months  Upon evaluation, pt presents w/ the following deficits: weakness, impaired balance, decreased endurance, pain limiting functional mobility and inability to ambulate at this time  Pt currently requires mod Ax1 for bed mobility and mod Ax1 for transfers  Pt's clinical presentation is unstable/unpredictable due to need for assist w/ all phases of mobility when usually mobilizing independently, pain impacting overall mobility status, need for input for mobility technique/safety, ongoing medical monitoring/management, and recent drastic decline in mobility compared to baseline  Pt is at an increased risk of falls due to impaired cognition, impaired balance, decreased safety awareness  From a PT/mobility standpoint, given the above findings, DC recommendation is: Post-acute inpatient rehabilitation  During current admission, pt will benefit from continued skilled inpatient PT in the acute care setting in order to address the above deficits and to maximize function and mobility prior to DC from acute care     Barriers to Discharge Inaccessible home environment   Goals   Patient Goals to be able to clear throat/mucus   STG Expiration Date 08/04/22   Short Term Goal #1 Pt will: perform bed mobility w/ supervision to decrease pt's burden of care and increase pt's independence w/ repositioning in bed; perform transfers w/ supervision to increase pt's OOB mobility; ambulate at least 100' w/ LRAD and supervision to increase pt's ambulatory endurance/tolerance; negotiate 4 steps w/ UE support and min Ax1 to facilitate pt returning to previous living environment; increase all balance ratings by at least 1 grade to decrease pt's risk of falls   PT Treatment Day 0   Plan   Treatment/Interventions Functional transfer training;LE strengthening/ROM; Elevations; Therapeutic exercise; Endurance training;Cognitive reorientation;Patient/family training;Equipment eval/education; Bed mobility;Gait training   PT Frequency 3-5x/wk   Recommendation   PT Discharge Recommendation Post acute rehabilitation services   Equipment Recommended   (to be determined w/ future PT tx sessions)   AM-PAC Basic Mobility Inpatient   Turning in Bed Without Bedrails 3   Lying on Back to Sitting on Edge of Flat Bed 2   Moving Bed to Chair 2   Standing Up From Chair 2   Walk in Room 1   Climb 3-5 Stairs 1   Basic Mobility Inpatient Raw Score 11   Basic Mobility Standardized Score 30 25   Highest Level Of Mobility   -HLM Goal 4: Move to chair/commode   JH-HLM Achieved 4: Move to chair/commode   End of Consult   Patient Position at End of Consult Supine;Bed/Chair alarm activated; All needs within reach  [facility] elevated, virtual 1:1, Respiratory therapist present in room)       The patient's AM-PAC Basic Mobility Inpatient Short Form Raw Score is 11  A Raw score of less than or equal to 16 suggests the patient may benefit from discharge to post-acute rehabilitation services  Please also refer to the recommendation of the Physical Therapist for safe discharge planning      Pt will benefit from skilled inpatient PT during this admission in order to facilitate progress towards goals and to maximize functional independence prior to [place] [id number] rec: post acute rehab        Marry Malagon, PT, DPT  07/25/22

## 2022-07-25 NOTE — CONSULTS
Consultation - Palliative and Supportive Care   Adan Hutson 80 y o  female [id number]    Assessment:  Encephalopathy  Alzheimer's disease  Aspiration into airway  Asthma  CKD3a  Hypokalemia  Weakness  Continuous opioid dependence  Goals of care counseling    Goals:  Level 3 code status  · Continue minimally invasive medical care until discharge  · Daughter/HCA Koffi Huffman has decided to proceed with hospice consult  · Edouard Oneill wants patient back home for hospice  · Hospice referral placed and liaison updated  · Family is hopeful for discharge Wednesday evening after air conditioning is installed  Plan:  Symptom management:  · Will defer symptom management to the primary team at this time  Social support provided with daughter Edouard Oneill:  Chika Rausch is finding comfort in knowing that she can bring her mom back home  · Supportive listening provided - Edouard Oneill is very tearful discussing the lack of engagement from  Her siblings  · Normalized experience of patient/family  · Provided anxiety containment    I have reviewed the patient's controlled substance dispensing history in the Prescription Drug Monitoring Program in compliance with the [facility] regulations before prescribing any controlled substances  Last refills  05/05/2022  1   01/10/2022  Tramadol Hcl 50 MG Tablet    60 00  30 Je New   [id number]   Corrina ([id number])    10 00 MME  Medicare   [place]   02/17/2022  1   01/10/2022  Tramadol Hcl 50 MG Tablet    60 00  30 Je New   [id number]   Corrina ([id number])            Decisional apparatus:  Based on chart review patient is not competent  Patient's substitute decision maker would default to her 6 adult children by [place] Act 169 however she had advanced directives naming her daughter Koffi Huffman as her primary HCA with daughter Rosalva Mendiola as her alternative HCA  Advance Directive/Living Will: on file and reviwed  POLST: none  POA Forms: None    We appreciate the invitation to be involved in this patient's care    We will continue to follow throughout this hospitalization  Please do not hesitate to reach our on call provider through our clinic answering service at  should you have acute symptom control concerns  Chucho Gao, MSN, CRNP, [facility]  Palliative and Supportive Care  Clinic/Answering Service: [phone]  You can find me on [employer]! IDENTIFICATION:  Consults  Physician Requesting Consult: Sergio Nagy*  Reason for Consult / Principal Problem: GOC counseling and SM secondary to dementia  History of Present Illness:  Gayle Mcfarlane is a 80 y o  female who presents with a palliative diagnosis of dementia  She was brought into the ED at [facility] on 22 secondary to shortness of breath, heat exhaustion and shortness of breath  She improved after fluids in the ED however just prior to discharge lactic acid slowly worsened from 2 5-6 8 with an unclear etiology  Additional IV fluids were were not beneficial in improving her lactic acidosis and she has been inpatient since that time  Spoke to patient's daughter Sarah Restrepo  Sarah Restrepo reports patient has 6 adult biological children however they are minimally engaged in her day-to-day life  Advanced directives name Minhwanda Restrepo as primary healthcare agent with daughter Juany as backup  Discussed patient's ongoing health issues and Sarah Restrepo really would not want patient to be brought back to the hospital again  She wants patient to be kept in the home and comfortable  Sarah Restrepo is familiar with hospice as her  and her father both  on hospice at home  We discussed the hospice philosophy and the services provided and Sarah Restrepo would like to proceed with hospice referral at this time  Minhwanda Lauro is having air-conditioning put in the house Wednesday morning and therefore is hopeful that patient could be discharged Wednesday evening as dangerous temperatures in the [facility] attributed to her hospitalization last week   Hospice referral placed  Review of Systems  No ROS as consult was completed virtually    Past Medical History:   Diagnosis Date    Arthritis     Asthma     Cancer ([facility] )     Chondrocalcinosis     Chronic sciatica     Dementia ([facility] )     Diverticulosis     GERD (gastroesophageal reflux disease)     Gout     High blood pressure     History of low potassium     Hyperlipidemia     Insomnia     Iron deficiency anemia     Low vitamin D level     Myocardial infarction ([facility] )     Neuropathy     Stage 3a chronic kidney disease ([facility] ) 2021    Uterine cancer ([place] [id number] )      Past Surgical History:   Procedure Laterality Date    APPENDECTOMY      CATARACT EXTRACTION Right     COLONOSCOPY      ?  EGD  2019    upper- Hiatal hernia    ESOPHAGOGASTRODUODENOSCOPY      HYSTERECTOMY      TONSILLECTOMY       Social History     Socioeconomic History    Marital status:      Spouse name: Not on file    Number of children: Not on file    Years of education: Not on file    Highest education level: Not on file   Occupational History    Occupation: Retired    Tobacco Use    Smoking status: Never Smoker    Smokeless tobacco: Never Used   Vaping Use    Vaping Use: Never used   Substance and Sexual Activity    Alcohol use: No    Drug use: No    Sexual activity: Not on file   Other Topics Concern    Not on file   Social History Narrative    Most recent tobacco use screenin2020    Do you currently or have you served in [employer]: No    Were you activated, into active duty, as a member of the [employer] [facility] or as a Reservist: No    Occupation: retired    Marital status:      Sexual orientation: Heterosexual    Diet: Regular    General stress level: Low    Alcohol intake: None    Caffeine intake: Occasional    Chewing tobacco: none    Illicit drugs: denies    Guns present in home: No    Seat belts used routinely: Yes    Sunscreen used routinely: Yes    Smoke alarm in home: Yes    Advance directive: Yes    Live alone or with others: with others    International travel: none    Pets: No    Sexually active: No     Social Determinants of Health     Financial Resource Strain: Not on file   Food Insecurity: No Food Insecurity    Worried About Running Out of Food in the Last Year: Never true    [address] in the Last Year: Never true   Transportation Needs: No Transportation Needs    Lack of Transportation (Medical): No    Lack of Transportation (Non-Medical): No   Physical Activity: Not on file   Stress: Not on file   Social Connections: Not on file   Intimate Partner Violence: Not on file   Housing Stability: Unknown    Unable to Pay for Housing in the Last Year: No    Number of Places Lived in the Last Year: Not on file    Unstable Housing in the Last Year: No     Family History   Problem Relation Age of Onset    Cancer Sister     Stroke Sister     Heart disease Brother      Medications:  all current active meds have been reviewed    No Known Allergies    Objective:  /77 (BP Location: Left arm)   Pulse 80   Temp 100 2 °F (37 9 °C) (Tympanic)   Resp 18   Ht 5' (1 524 m)   Wt 60 4 kg (133 lb 2 5 oz)   SpO2 90%   BMI 26 01 kg/m²   Physical Exam:  No PE as consult was completed virtually    Lab Results:   I have personally reviewed pertinent labs  , CBC:   Lab Results   Component Value Date    WBC 9 56 07/25/2022    HGB 9 9 (L) 07/25/2022    HCT 30 5 (L) 07/25/2022    MCV 94 07/25/2022     07/25/2022    MCH 30 4 07/25/2022    MCHC 32 5 07/25/2022    RDW 16 1 (H) 07/25/2022    MPV 11 1 07/25/2022   , CMP:   Lab Results   Component Value Date    SODIUM 145 07/25/2022    K 3 4 (L) 07/25/2022     (H) 07/25/2022    CO2 26 07/25/2022    BUN 8 07/25/2022    CREATININE 0 67 07/25/2022    CALCIUM 7 8 (L) 07/25/2022    EGFR 74 07/25/2022     Imaging Studies: I have personally reviewed pertinent reports    EKG, Pathology, and Other Studies: I have personally reviewed pertinent reports  Counseling / Coordination of Care  Total floor / unit time spent today 45 minutes  Greater than 50% of total time was spent with the patient and / or family counseling and / or coordination of care  A description of the counseling / coordination of care: Reviewed chart, provided medical updates, determined goals of care, discussed palliative care and symptom management, discussed comfort care and hospice care, discussed code status, determined competency and POA/HCA, determined social/family support, provided psychosocial support  Reviewed with SLIM Dr Moe Lamb, hospice liaison Anastasiya Hale, and [employer]  Portions of this document may have been created using dictation software and as such some "sound alike" terms may have been generated by the system  Do not hesitate to contact me with any questions or clarifications

## 2022-07-25 NOTE — PLAN OF CARE
Problem: PHYSICAL THERAPY ADULT  Goal: Performs mobility at highest level of function for planned discharge setting  See evaluation for individualized goals  Description: Treatment/Interventions: Functional transfer training, LE strengthening/ROM, Elevations, Therapeutic exercise, Endurance training, Cognitive reorientation, Patient/family training, Equipment eval/education, Bed mobility, Gait training  Equipment Recommended:  (to be determined w/ future PT tx sessions)       See flowsheet documentation for full assessment, interventions and recommendations  7/25/2022 1046 by Jagdish Dominguez PT  Note: Prognosis: Fair  Problem List: Decreased strength, Decreased endurance, Impaired balance, Decreased mobility, Decreased cognition, Impaired judgement, Decreased safety awareness, Pain  Assessment: Yahaira Alonso is a 80 y o  Female who [facility]22/2022 w/ c/o SOB, diarrhea and diagnosis of lactic acidosis  Orders for PT eval and treat received, w/ activity orders of up w/ assist w/ aspiration and fall risk precautions  Comorbidities affecting pt at time of evaluation include: h/o COVID, asthma, arthritis, MI, dementia, HTN, neuropathy  Personal factors affecting DC include: inaccessible home environment, stairs to enter home, inability to ambulate household distances, inability to navigate level surfaces w/o external assistance, decreased cognition and inability to perform IADLs  At baseline, pt mobilizes independently w/ no AD, and w/ 0 fall(s) in the previous 6 months  Upon evaluation, pt presents w/ the following deficits: weakness, impaired balance, decreased endurance, pain limiting functional mobility and inability to ambulate at this time  Pt currently requires mod Ax1 for bed mobility and mod Ax1 for transfers   Pt's clinical presentation is unstable/unpredictable due to need for assist w/ all phases of mobility when usually mobilizing independently, pain impacting overall mobility status, need for input for mobility technique/safety, ongoing medical monitoring/management, and recent drastic decline in mobility compared to baseline  Pt is at an increased risk of falls due to impaired cognition, impaired balance, decreased safety awareness  From a PT/mobility standpoint, given the above findings, DC recommendation is: Post-acute inpatient rehabilitation  During current admission, pt will benefit from continued skilled inpatient PT in the acute care setting in order to address the above deficits and to maximize function and mobility prior to DC from acute care  Barriers to Discharge: Inaccessible home environment     PT Discharge Recommendation: Post acute rehabilitation services    See flowsheet documentation for full assessment

## 2022-07-25 NOTE — PLAN OF CARE
Problem: MOBILITY - ADULT  Goal: Maintain or return to baseline ADL function  Description: INTERVENTIONS:  -  Assess patient's ability to carry out ADLs; assess patient's baseline for ADL function and identify physical deficits which impact ability to perform ADLs (bathing, care of mouth/teeth, toileting, grooming, dressing, etc )  - Assess/evaluate cause of self-care deficits   - Assess range of motion  - Assess patient's mobility; develop plan if impaired  - Assess patient's need for assistive devices and provide as appropriate  - Encourage maximum independence but intervene and supervise when necessary  - Involve family in performance of ADLs  - Assess for home care needs following discharge   - Consider OT consult to assist with ADL evaluation and planning for discharge  - Provide patient education as appropriate  Outcome: Progressing     Problem: Prexisting or High Potential for Compromised Skin Integrity  Goal: Skin integrity is maintained or improved  Description: INTERVENTIONS:  - Identify patients at risk for skin breakdown  - Assess and monitor skin integrity  - Assess and monitor nutrition and hydration status  - Monitor labs   - Assess for incontinence   - Turn and reposition patient  - Assist with mobility/ambulation  - Relieve pressure over bony prominences  - Avoid friction and shearing  - Provide appropriate hygiene as needed including keeping skin clean and dry  - Evaluate need for skin moisturizer/barrier cream  - Collaborate with interdisciplinary team   - Patient/family teaching  - Consider wound care consult   Outcome: Progressing     Problem: PAIN - ADULT  Goal: Verbalizes/displays adequate comfort level or baseline comfort level  Description: Interventions:  - Encourage patient to monitor pain and request assistance  - Assess pain using appropriate pain scale  - Administer analgesics based on type and severity of pain and evaluate response  - Implement non-pharmacological measures as appropriate and evaluate response  - Consider cultural and social influences on pain and pain management  - Notify physician/advanced practitioner if interventions unsuccessful or patient reports new pain  Outcome: Progressing     Problem: INFECTION - ADULT  Goal: Absence or prevention of progression during hospitalization  Description: INTERVENTIONS:  - Assess and monitor for signs and symptoms of infection  - Monitor lab/diagnostic results  - Monitor all insertion sites, i e  indwelling lines, tubes, and drains  - Monitor endotracheal if appropriate and nasal secretions for changes in amount and color  - [place] appropriate cooling/warming therapies per order  - Administer medications as ordered  - Instruct and encourage patient and family to use good hand hygiene technique  - Identify and instruct in appropriate isolation precautions for identified infection/condition  Outcome: Progressing     Problem: SAFETY ADULT  Goal: Maintain or return to baseline ADL function  Description: INTERVENTIONS:  -  Assess patient's ability to carry out ADLs; assess patient's baseline for ADL function and identify physical deficits which impact ability to perform ADLs (bathing, care of mouth/teeth, toileting, grooming, dressing, etc )  - Assess/evaluate cause of self-care deficits   - Assess range of motion  - Assess patient's mobility; develop plan if impaired  - Assess patient's need for assistive devices and provide as appropriate  - Encourage maximum independence but intervene and supervise when necessary  - Involve family in performance of ADLs  - Assess for home care needs following discharge   - Consider OT consult to assist with ADL evaluation and planning for discharge  - Provide patient education as appropriate  Outcome: Progressing     Problem: DISCHARGE PLANNING  Goal: Discharge to home or other facility with appropriate resources  Description: INTERVENTIONS:  - Identify barriers to discharge w/patient and caregiver  - Arrange for needed discharge resources and transportation as appropriate  - Identify discharge learning needs (meds, wound care, etc )  - Arrange for interpretive services to assist at discharge as needed  - Refer to Case Management Department for coordinating discharge planning if the patient needs post-hospital services based on physician/advanced practitioner order or complex needs related to functional status, cognitive ability, or social support system  Outcome: Progressing     Problem: Knowledge Deficit  Goal: Patient/family/caregiver demonstrates understanding of disease process, treatment plan, medications, and discharge instructions  Description: Complete learning assessment and assess knowledge base    Interventions:  - Provide teaching at level of understanding  - Provide teaching via preferred learning methods  Outcome: Progressing     Problem: Potential for Falls  Goal: Patient will remain free of falls  Description: INTERVENTIONS:  - Educate patient/family on patient safety including physical limitations  - Instruct patient to call for assistance with activity   - Consult OT/PT to assist with strengthening/mobility   - Keep Call bell within reach  - Keep bed low and locked with side rails adjusted as appropriate  - Keep care items and personal belongings within reach  - Initiate and maintain comfort rounds  - Make Fall Risk Sign visible to staff  - Offer Toileting every 3 Hours, in advance of need  - Initiate/Maintain bed alarm  - Obtain necessary fall risk management equipment:   - Apply yellow socks and bracelet for high fall risk patients  - Consider moving patient to room near nurses station  Outcome: Progressing

## 2022-07-26 PROBLEM — Z71.89 GOALS OF CARE, COUNSELING/DISCUSSION: Status: ACTIVE | Noted: 2022-01-01

## 2022-07-26 NOTE — ASSESSMENT & PLAN NOTE
· K 3 1  · Status post repletion in the ED  · Trend BMP  · Replete as necessary  · Improved to 3 4, refused po dose today

## 2022-07-26 NOTE — PLAN OF CARE
Problem: MOBILITY - ADULT  Goal: Maintain or return to baseline ADL function  Description: INTERVENTIONS:  -  Assess patient's ability to carry out ADLs; assess patient's baseline for ADL function and identify physical deficits which impact ability to perform ADLs (bathing, care of mouth/teeth, toileting, grooming, dressing, etc )  - Assess/evaluate cause of self-care deficits   - Assess range of motion  - Assess patient's mobility; develop plan if impaired  - Assess patient's need for assistive devices and provide as appropriate  - Encourage maximum independence but intervene and supervise when necessary  - Involve family in performance of ADLs  - Assess for home care needs following discharge   - Consider OT consult to assist with ADL evaluation and planning for discharge  - Provide patient education as appropriate  Outcome: Progressing  Goal: Maintains/Returns to pre admission functional level  Description: INTERVENTIONS:  - Perform BMAT or MOVE assessment daily    - Set and communicate daily mobility goal to care team and patient/family/caregiver  - Collaborate with rehabilitation services on mobility goals if consulted  - Perform Range of Motion 2 times a day  - Reposition patient every 2 hours    - Dangle patient 2 times a day  - Stand patient 2 times a day  - Ambulate patient 2 times a day  - Out of bed to chair 2 times a day   - Out of bed for meals 2 times a day  - Out of bed for toileting  - Record patient progress and toleration of activity level   Outcome: Progressing     Problem: Prexisting or High Potential for Compromised Skin Integrity  Goal: Skin integrity is maintained or improved  Description: INTERVENTIONS:  - Identify patients at risk for skin breakdown  - Assess and monitor skin integrity  - Assess and monitor nutrition and hydration status  - Monitor labs   - Assess for incontinence   - Turn and reposition patient  - Assist with mobility/ambulation  - Relieve pressure over bony prominences  - Avoid friction and shearing  - Provide appropriate hygiene as needed including keeping skin clean and dry  - Evaluate need for skin moisturizer/barrier cream  - Collaborate with interdisciplinary team   - Patient/family teaching  - Consider wound care consult   Outcome: Progressing     Problem: PAIN - ADULT  Goal: Verbalizes/displays adequate comfort level or baseline comfort level  Description: Interventions:  - Encourage patient to monitor pain and request assistance  - Assess pain using appropriate pain scale  - Administer analgesics based on type and severity of pain and evaluate response  - Implement non-pharmacological measures as appropriate and evaluate response  - Consider cultural and social influences on pain and pain management  - Notify physician/advanced practitioner if interventions unsuccessful or patient reports new pain  Outcome: Progressing     Problem: INFECTION - ADULT  Goal: Absence or prevention of progression during hospitalization  Description: INTERVENTIONS:  - Assess and monitor for signs and symptoms of infection  - Monitor lab/diagnostic results  - Monitor all insertion sites, i e  indwelling lines, tubes, and drains  - Monitor endotracheal if appropriate and nasal secretions for changes in amount and color  - [place] appropriate cooling/warming therapies per order  - Administer medications as ordered  - Instruct and encourage patient and family to use good hand hygiene technique  - Identify and instruct in appropriate isolation precautions for identified infection/condition  Outcome: Progressing  Goal: Absence of fever/infection during neutropenic period  Description: INTERVENTIONS:  - Monitor WBC    Outcome: Progressing     Problem: SAFETY ADULT  Goal: Maintain or return to baseline ADL function  Description: INTERVENTIONS:  -  Assess patient's ability to carry out ADLs; assess patient's baseline for ADL function and identify physical deficits which impact ability to perform ADLs (bathing, care of mouth/teeth, toileting, grooming, dressing, etc )  - Assess/evaluate cause of self-care deficits   - Assess range of motion  - Assess patient's mobility; develop plan if impaired  - Assess patient's need for assistive devices and provide as appropriate  - Encourage maximum independence but intervene and supervise when necessary  - Involve family in performance of ADLs  - Assess for home care needs following discharge   - Consider OT consult to assist with ADL evaluation and planning for discharge  - Provide patient education as appropriate  Outcome: Progressing  Goal: Maintains/Returns to pre admission functional level  Description: INTERVENTIONS:  - Perform BMAT or MOVE assessment daily    - Set and communicate daily mobility goal to care team and patient/family/caregiver  - Collaborate with rehabilitation services on mobility goals if consulted  - Perform Range of Motion 2 times a day  - Reposition patient every 2 hours    - Dangle patient 2 times a day  - Stand patient 2 times a day  - Ambulate patient 2 times a day  - Out of bed to chair 2 times a day   - Out of bed for meals 2 times a day  - Out of bed for toileting  - Record patient progress and toleration of activity level   Outcome: Progressing  Goal: Patient will remain free of falls  Description: INTERVENTIONS:  - Educate patient/family on patient safety including physical limitations  - Instruct patient to call for assistance with activity   - Consult OT/PT to assist with strengthening/mobility   - Keep Call bell within reach  - Keep bed low and locked with side rails adjusted as appropriate  - Keep care items and personal belongings within reach  - Initiate and maintain comfort rounds  - Make Fall Risk Sign visible to staff  - Offer Toileting every 2 Hours, in advance of need  - Initiate/Maintain BED alarm  - Obtain necessary fall risk management equipment: - Apply yellow socks and bracelet for high fall risk patients  - Consider moving patient to room near nurses station  Outcome: Progressing     Problem: DISCHARGE PLANNING  Goal: Discharge to home or other facility with appropriate resources  Description: INTERVENTIONS:  - Identify barriers to discharge w/patient and caregiver  - Arrange for needed discharge resources and transportation as appropriate  - Identify discharge learning needs (meds, wound care, etc )  - Arrange for interpretive services to assist at discharge as needed  - Refer to Case Management Department for coordinating discharge planning if the patient needs post-hospital services based on physician/advanced practitioner order or complex needs related to functional status, cognitive ability, or social support system  Outcome: Progressing     Problem: Knowledge Deficit  Goal: Patient/family/caregiver demonstrates understanding of disease process, treatment plan, medications, and discharge instructions  Description: Complete learning assessment and assess knowledge base    Interventions:  - Provide teaching at level of understanding  - Provide teaching via preferred learning methods  Outcome: Progressing     Problem: Potential for Falls  Goal: Patient will remain free of falls  Description: INTERVENTIONS:  - Educate patient/family on patient safety including physical limitations  - Instruct patient to call for assistance with activity   - Consult OT/PT to assist with strengthening/mobility   - Keep Call bell within reach  - Keep bed low and locked with side rails adjusted as appropriate  - Keep care items and personal belongings within reach  - Initiate and maintain comfort rounds  - Make Fall Risk Sign visible to staff  - Offer Toileting every 2 Hours, in advance of need  - Initiate/Maintain BED alarm  - Obtain necessary fall risk management equipment: - Apply yellow socks and bracelet for high fall risk patients  - Consider moving patient to room near nurses station  Outcome: Progressing     Problem: Nutrition/Hydration-ADULT  Goal: Nutrient/Hydration intake appropriate for improving, restoring or maintaining nutritional needs  Description: Monitor and assess patient's nutrition/hydration status for malnutrition  Collaborate with interdisciplinary team and initiate plan and interventions as ordered  Monitor patient's weight and dietary intake as ordered or per policy  Utilize nutrition screening tool and intervene as necessary  Determine patient's food preferences and provide high-protein, high-caloric foods as appropriate       INTERVENTIONS:  - Monitor oral intake, urinary output, labs, and treatment plans  - Assess nutrition and hydration status and recommend course of action  - Evaluate amount of meals eaten  - Assist patient with eating if necessary   - Allow adequate time for meals  - Recommend/ encourage appropriate diets, oral nutritional supplements, and vitamin/mineral supplements  - Order, calculate, and assess calorie counts as needed  - Recommend, monitor, and adjust tube feedings and TPN/PPN based on assessed needs  - Assess need for intravenous fluids  - Provide specific nutrition/hydration education as appropriate  - Include patient/family/caregiver in decisions related to nutrition  Outcome: Progressing

## 2022-07-26 NOTE — HOSPICE NOTE
Received hospice referral   I spoke with pt's daughter David Yadav to discuss hospice services  Hospice benefits reviewed per Medicare guidelines and all questions answered  Dr Jarad Schmidt approved for home hospice LOC  Discussed equipment need at home  Equipment ordered from [employer] [address] for delivery on 7/28/22 in the morning  HUMBERTO Goss updated and will arrange transport for 7/28 in the afternoon  Will follow pt until discharge

## 2022-07-26 NOTE — ASSESSMENT & PLAN NOTE
Palliative was consulted 7/25  · Continue minimally invasive medical care until discharge  · Daughter/[facility] Juwan Szymanski has decided to proceed with hospice consult  · Susie Cuevas wants patient back home for hospice  · Hospice referral placed and liaison updated  · Family is hopeful for discharge Wednesday evening after air conditioning is installed

## 2022-07-26 NOTE — PROGRESS NOTES
[address]  Progress Note - Mica Anthony 7/4/1927, 80 y o  female MRN: [id number]  Unit/Bed#: -01 Encounter: [id number]  Primary Care Provider: Glo Worley MD   Date and time admitted to hospital: 7/22/2022  3:28 PM    Goals of care, counseling/discussion  Assessment & Plan  Palliative was consulted 7/25  · Continue minimally invasive medical care until discharge  · Daughter/[facility] Asad Castano has decided to proceed with hospice consult  · Pilo Perdomo wants patient back home for hospice  · Hospice referral placed and liaison updated  · Family is hopeful for discharge Wednesday evening after air conditioning is installed        * Lactic acidosis  Assessment & Plan  · Unclear etiology  · Patient received 1 L of normal saline solution in the ED with mild improvement of lactate from 2 9-2 8 however after another L of IV fluids lactate up to 6 5 and now 6 8  · No signs of active infection  · Patient not hypotensive  · Will hydrate with Isolyte at 125 cc/hour  · Trend lactate until normalized  · Continue to monitor for signs of active infection    Encephalopathy  Assessment & Plan  Delirium , with underlying dementia ,   Will give haldol prn ,    Aspiration into airway  Assessment & Plan  Pt had episode of aspiration this afternoon,  Will keep NPO    check CXR and aspiration precautions   Cont rocephin      Hypokalemia  Assessment & Plan  · K 3 1  · Status post repletion in the ED  · Trend BMP  · Replete as necessary  · Improved to 3 4, refused po dose today    Stage 3a chronic kidney disease [facility])  Assessment & Plan  Lab Results   Component Value Date    EGFR 74 07/25/2022    EGFR 69 07/24/2022    EGFR 64 07/23/2022    CREATININE 0 67 07/25/2022    CREATININE 0 74 07/24/2022    CREATININE 0 78 07/23/2022   · Creatinine at baseline  · Avoid hypotension and nephrotoxic agents  · Trend BMP    Continuous opioid dependence (HCC)  Assessment & Plan  · Continue home tramadol    Alzheimer's disease, early onset [facility])  Assessment & Plan  · Supportive care    Asthma  Assessment & Plan  · Initially hypoxic at home however resolved with DuoNebs in the ED  · Saturating well on room air  · Continue home inhalers  · Respiratory protocol    Essential hypertension  Assessment & Plan  · Normotensive on presentation  · Hold antihypertensives at this time in the setting of normotension and lactic acidosis  · Monitor blood pressures    General weakness  Assessment & Plan  · Initially presents following a syncopal episode  · Likely heat exhaustion which has since resolved  · Will consult PT/OT  · Up with assistance          VTE Pharmacologic Prophylaxis: VTE Score: 3 Moderate Risk (Score 3-4) - Pharmacological DVT Prophylaxis Ordered: heparin  Patient Centered Rounds: I performed bedside rounds with nursing staff today  Discussions with Specialists or Other Care Team Provider: case management    Education and Discussions with Family / Patient: Updated  (daughter) via phone  Time Spent for Care: 30 minutes  More than 50% of total time spent on counseling and coordination of care as described above  Current Length of Stay: 3 day(s)  Current Patient Status: Inpatient   Certification Statement: The patient will continue to require additional inpatient hospital stay due to pending hospice consult  Discharge Plan: Anticipate discharge tomorrow to home hospice    Code Status: Level 3 - DNAR and DNI    Subjective:   Patient seen examined at bedside  Unable to provide much history secondary to confusion  Denies any pain fever chills sob or abdominal pain  Objective:     Vitals:   Temp (24hrs), Av 9 °F (37 2 °C), Min:98 5 °F (36 9 °C), Max:99 3 °F (37 4 °C)    Temp:  [98 5 °F (36 9 °C)-99 3 °F (37 4 °C)] 99 3 °F (37 4 °C)  HR:  [72-90] 72  Resp:  [18-20] 20  BP: (146-156)/(75-82) 156/75  SpO2:  [91 %-94 %] 94 %  Body mass index is 26 01 kg/m²       Input and Output Summary (last 24 hours): Intake/Output Summary (Last 24 hours) at 7/26/2022 1246  Last data filed at 7/26/2022 0901  Gross per 24 hour   Intake 220 ml   Output 1900 ml   Net -1680 ml       Physical Exam:   Physical Exam  Vitals reviewed  HENT:      Head: Normocephalic and atraumatic  Right Ear: External ear normal       Left Ear: External ear normal       Nose: Nose normal       Mouth/Throat:      Mouth: Mucous membranes are moist       Pharynx: Oropharynx is clear  Eyes:      Extraocular Movements: Extraocular movements intact  Cardiovascular:      Rate and Rhythm: Normal rate and regular rhythm  Heart sounds: Murmur heard  Pulmonary:      Breath sounds: Normal breath sounds  Abdominal:      General: Abdomen is flat  Palpations: Abdomen is soft  Tenderness: There is no abdominal tenderness  Musculoskeletal:      Cervical back: Normal range of motion  Right lower leg: No edema  Left lower leg: No edema  Skin:     General: Skin is warm and dry  Neurological:      Mental Status: She is alert  She is disoriented  Psychiatric:         Mood and Affect: Mood normal          Behavior: Behavior normal           Additional Data:     Labs:  Results from last 7 days   Lab Units 07/25/22  0532 07/24/22  1015 07/23/22  0548   WBC Thousand/uL 9 56   < > 9 52   HEMOGLOBIN g/dL 9 9*   < > 9 4*   HEMATOCRIT % 30 5*   < > 29 1*   PLATELETS Thousands/uL 159   < > 166   NEUTROS PCT %  --   --  93*   LYMPHS PCT %  --   --  5*   MONOS PCT %  --   --  1*   EOS PCT %  --   --  0    < > = values in this interval not displayed       Results from last 7 days   Lab Units 07/25/22  0532 07/23/22  0548 07/22/22  1545   SODIUM mmol/L 145   < > 140   POTASSIUM mmol/L 3 4*   < > 3 1*   CHLORIDE mmol/L 111*   < > 107   CO2 mmol/L 26   < > 23   BUN mg/dL 8   < > 13   CREATININE mg/dL 0 67   < > 0 94   ANION GAP mmol/L 8   < > 10   CALCIUM mg/dL 7 8*   < > 8 1*   ALBUMIN g/dL  --   --  3 1*   TOTAL BILIRUBIN mg/dL  --   -- 0  65   ALK PHOS U/L  --   --  50   ALT U/L  --   --  10   AST U/L  --   --  20   GLUCOSE RANDOM mg/dL 83   < > 215*    < > = values in this interval not displayed  Results from last 7 days   Lab Units 07/23/22  0555 07/23/22  0215 07/23/22  0024 07/22/22  2213 07/22/22  1734 07/22/22  1545   LACTIC ACID mmol/L 3 3* 6 3* 6 8* 8 2*   < > 2 9*   PROCALCITONIN ng/ml  --   --   --   --   --  0 05    < > = values in this interval not displayed         Lines/Drains:  Invasive Devices  Report    Peripheral Intravenous Line  Duration           Peripheral IV 07/25/22 Right;Dorsal (posterior) Forearm <1 day          Drain  Duration           External Urinary Catheter <1 day                      Imaging: Reviewed radiology reports from this admission including: chest xray    Recent Cultures (last 7 days):         Last 24 Hours Medication List:   Current Facility-Administered Medications   Medication Dose Route Frequency Provider Last Rate    acetaminophen  650 mg Oral Q4H PRN Watt Sorrel, DO      albuterol  2 puff Inhalation Q4H PRN Watt Sorrel, DO      albuterol  2 5 mg Nebulization Q6H PRN Thelma Belle MD      cefTRIAXone  1,000 mg Intravenous Q24H Chey Shelby DO      fluticasone-vilanterol  1 puff Inhalation Daily Watt Sorrel, DO      gabapentin  100 mg Oral TID Watt Sorrel, DO      haloperidol lactate  1 mg Intramuscular Q6H PRN Thelma Belle MD      heparin (porcine)  5,000 Units Subcutaneous Q8H [address] Chey Shelby DO      multi-electrolyte  75 mL/hr Intravenous Continuous Thelma Belle MD 75 mL/hr (07/26/22 0002)    ondansetron  4 mg Intravenous Q6H PRN Watt Sorrel, DO      QUEtiapine  12 5 mg Oral HS Emy Ayala MD      traMADol  50 mg Oral Q8H PRN Thelma Belle MD          Today, Patient Was Seen By: Nixon Leger DO    **Please Note: This note may have been constructed using a voice recognition system  **

## 2022-07-26 NOTE — QUICK NOTE
7/26/2022 3:47 PM -  Ewa Loomis chart and case were reviewed by Lilly Berrios  Mode of review included electronic chart check, and reviewed case with Dr Rayshawn Rios on [facility]  Primary team is managing symptoms    Patient is being accepted onto [facility] for a routine level of care at home with her daughter Raghavendra Kim with discharged planned for Wednesday evening  For dispo plan, please review Case Management notes  Patient is not appropriate for outpatient follow-up  For urgent issues or any questions/concerns, please notify on-call provider via [facility]  You may also call our answering service 24/7 at   MARIO Berrios  Palliative and Supportive Care  Clinic/Answering Service: [phone]  You can find me on [employer]!

## 2022-07-26 NOTE — PLAN OF CARE
Problem: SAFETY ADULT  Goal: Patient will remain free of falls  Description: INTERVENTIONS:  - Educate patient/family on patient safety including physical limitations  - Instruct patient to call for assistance with activity   - Consult OT/PT to assist with strengthening/mobility   - Keep Call bell within reach  - Keep bed low and locked with side rails adjusted as appropriate  - Keep care items and personal belongings within reach  - Initiate and maintain comfort rounds  - Make Fall Risk Sign visible to staff  - Initiate/Maintain bed alarm  - Apply yellow socks and bracelet for high fall risk patients  - Consider moving patient to room near nurses station  Outcome: Progressing     Problem: DISCHARGE PLANNING  Goal: Discharge to home or other facility with appropriate resources  Description: INTERVENTIONS:  - Identify barriers to discharge w/patient and caregiver  - Arrange for needed discharge resources and transportation as appropriate  - Identify discharge learning needs (meds, wound care, etc )  - Arrange for interpretive services to assist at discharge as needed  - Refer to Case Management Department for coordinating discharge planning if the patient needs post-hospital services based on physician/advanced practitioner order or complex needs related to functional status, cognitive ability, or social support system  Outcome: Progressing     Problem: Potential for Falls  Goal: Patient will remain free of falls  Description: INTERVENTIONS:  - Educate patient/family on patient safety including physical limitations  - Instruct patient to call for assistance with activity   - Consult OT/PT to assist with strengthening/mobility   - Keep Call bell within reach  - Keep bed low and locked with side rails adjusted as appropriate  - Keep care items and personal belongings within reach  - Initiate and maintain comfort rounds  - Make Fall Risk Sign visible to staff  - Initiate/Maintain bed alarm  - Apply yellow socks and bracelet for high fall risk patients  - Consider moving patient to room near nurses station  Outcome: Progressing

## 2022-07-26 NOTE — ASSESSMENT & PLAN NOTE
· Pt had episode of aspiration while admitted  · C/w dysphagia I and necator thick liquids  · Aspiration Precaution  · Cont rocephin

## 2022-07-27 NOTE — DISCHARGE INSTR - DIET
Consider Pureed food and lquid by tsp as desired and tolerated  Continue to crush medication for maximized ease and safety

## 2022-07-27 NOTE — SPEECH THERAPY NOTE
SLP Note    Patient Name: Soha Oh  PSTMF'P Date: 7/27/2022    Subjective:  "I had something stuck on the roof of my mouth" (referring to small amt of thick phlegm pt able to remove with tongue after being provided with liquid  Objective:  Pt was seen for diagnostic dysphagia therapy  No family present  Records were reviewed to begin  Aware of plan for patient to be discharged home with family tomorrow  Patient awakened with ease and was positioned upright  She was assessed with bites of pureed meat, vegetables, 2 oz of ice cream, as well as tsp of nectar thick and thin liquid  Patient was able to retrieve all materials from the tsp  Bolus transfer and control appeared to be grossly within functional limits when her head was supported by the pillow  Without pillow support, effort was required for patient to maintain head control  She presented with relatively prompt appearing swallow and no gross overt signs of aspiration this p m  However, in previous sessions, she presented with symptoms highly suggestive of significant pharyngeal dysphagia with textured food and thin liquid when straw was utilized  Educational materials were gathered/copied for the family including NDD 1/pureed diet, tips for pureed food as well as general aspiration precautions and strategies to maximize safety of intake  Materials were left for review by family as I was unable to reach them  All recommendations were also placed on the a the S as discharge is planned for tomorrow with home hospice services  Assessment:  Patient was alert, attentive and fully upright with her head supported this p m  Shelvy Mingle She tolerated small amounts of puree, ice cream, as well as nectar and thin liquid by tsp    In previous sessions, she evidence symptoms suggestive of significant pharyngeal dysphagia with textured food and thin liquid by straw sips    Plan/Recommendations:  Continue to offer pureed diet, as well as nectar thickened and thin liquid by tsp as desired and tolerated  Continue to crush medication for maximized ease and safety  I remain available to family should any additional questions or concerns arise      Annmarie Gardner [facility] , [address] Therapist [phone]

## 2022-07-27 NOTE — PLAN OF CARE
Problem: MOBILITY - ADULT  Goal: Maintain or return to baseline ADL function  Description: INTERVENTIONS:  -  Assess patient's ability to carry out ADLs; assess patient's baseline for ADL function and identify physical deficits which impact ability to perform ADLs (bathing, care of mouth/teeth, toileting, grooming, dressing, etc )  - Assess/evaluate cause of self-care deficits   - Assess range of motion  - Assess patient's mobility; develop plan if impaired  - Assess patient's need for assistive devices and provide as appropriate  - Encourage maximum independence but intervene and supervise when necessary  - Involve family in performance of ADLs  - Assess for home care needs following discharge   - Consider OT consult to assist with ADL evaluation and planning for discharge  - Provide patient education as appropriate  Outcome: Progressing  Goal: Maintains/Returns to pre admission functional level  Description: INTERVENTIONS:  - Perform BMAT or MOVE assessment daily    - Set and communicate daily mobility goal to care team and patient/family/caregiver     - Collaborate with rehabilitation services on mobility goals if consulted  -- Out of bed for toileting  - Record patient progress and toleration of activity level   Outcome: Progressing     Problem: PAIN - ADULT  Goal: Verbalizes/displays adequate comfort level or baseline comfort level  Description: Interventions:  - Encourage patient to monitor pain and request assistance  - Assess pain using appropriate pain scale  - Administer analgesics based on type and severity of pain and evaluate response  - Implement non-pharmacological measures as appropriate and evaluate response  - Consider cultural and social influences on pain and pain management  - Notify physician/advanced practitioner if interventions unsuccessful or patient reports new pain  Outcome: Progressing     Problem: SAFETY ADULT  Goal: Maintain or return to baseline ADL function  Description: INTERVENTIONS:  -  Assess patient's ability to carry out ADLs; assess patient's baseline for ADL function and identify physical deficits which impact ability to perform ADLs (bathing, care of mouth/teeth, toileting, grooming, dressing, etc )  - Assess/evaluate cause of self-care deficits   - Assess range of motion  - Assess patient's mobility; develop plan if impaired  - Assess patient's need for assistive devices and provide as appropriate  - Encourage maximum independence but intervene and supervise when necessary  - Involve family in performance of ADLs  - Assess for home care needs following discharge   - Consider OT consult to assist with ADL evaluation and planning for discharge  - Provide patient education as appropriate  Outcome: Progressing  Goal: Maintains/Returns to pre admission functional level  Description: INTERVENTIONS:  - Perform BMAT or MOVE assessment daily    - Set and communicate daily mobility goal to care team and patient/family/caregiver  - Collaborate with rehabilitation services on mobility goals if consulted  - Perform Range of Motion 2 times a day  - Reposition patient every 2  hours    - Record patient progress and toleration of activity level   Outcome: Progressing

## 2022-07-27 NOTE — PROGRESS NOTES
[place]  66   Progress Note - Vik Jacqueline 1927, 80 y o  female MRN: [id number]  Unit/Bed#: -01 Encounter: [id number]  Primary Care Provider: Ilene Henry MD   Date and time admitted to hospital: 2022  3:28 PM    Goals of care, counseling/discussion  Assessment & Plan  Palliative was consulted   · Continue minimally invasive medical care until discharge  · Daughter/[facility] Juwan Szymanski has decided to proceed with hospice consult  · Susie Cuevas wants patient back home for hospice  · Hospice referral placed and liaison updated  · Patient is to be discharged to Home with Home Hospice on ()      * Encephalopathy  Assessment & Plan  · Delirium   · Underlying dementia  · C/w Virtual 1:1    Aspiration into airway  Assessment & Plan  · Pt had episode of aspiration while admitted  · C/w dysphagia I and necator thick liquids  · Aspiration Precaution  · Cont rocephin        VTE Pharmacologic Prophylaxis: VTE Score: 3 Moderate Risk (Score 3-4) - Pharmacological DVT Prophylaxis Ordered: heparin  Patient Centered Rounds: I performed bedside rounds with nursing staff today  Discussions with Specialists or Other Care Team Provider: Case Management    Education and Discussions with Family / Patient: Updated  (daughter) via phone  Time Spent for Care: 15 minutes  More than 50% of total time spent on counseling and coordination of care as described above  Current Length of Stay: 4 day(s)  Current Patient Status: Inpatient   Certification Statement: The patient will continue to require additional inpatient hospital stay due to pending Hospice equipment delivery  Discharge Plan: Anticipate discharge tomorrow to home with home services  Code Status: Level 3 - DNAR and DNI    Subjective:   Patient asleep  Resting comfortably  Does not appear to be in acute distress       Objective:     Vitals:   Temp (24hrs), Av 3 °F (36 8 °C), Min:98 1 °F (36 7 °C), Max:98 5 °F (36 9 °C)    Temp:  [98 1 °F (36 7 °C)-98 5 °F (36 9 °C)] 98 5 °F (36 9 °C)  HR:  [62-68] 68  Resp:  [16-20] 16  BP: (142-153)/(60-86) 142/60  SpO2:  [92 %-97 %] 92 %  Body mass index is 25 36 kg/m²  Input and Output Summary (last 24 hours): Intake/Output Summary (Last 24 hours) at 7/27/2022 1223  Last data filed at 7/27/2022 1050  Gross per 24 hour   Intake 2057 5 ml   Output --   Net 2057 5 ml       Physical Exam:   Physical Exam  Vitals and nursing note reviewed  Constitutional:       General: She is sleeping  She is not in acute distress  HENT:      Head: Normocephalic  Mouth/Throat:      Mouth: Mucous membranes are dry  Pulmonary:      Effort: Pulmonary effort is normal  No respiratory distress  Neurological:      Mental Status: Mental status is at baseline  Psychiatric:         Cognition and Memory: Cognition is impaired  Memory is impaired  Judgment: Judgment is impulsive  Additional Data:     Labs:  Results from last 7 days   Lab Units 07/25/22  0532 07/24/22  1015 07/23/22  0548   WBC Thousand/uL 9 56   < > 9 52   HEMOGLOBIN g/dL 9 9*   < > 9 4*   HEMATOCRIT % 30 5*   < > 29 1*   PLATELETS Thousands/uL 159   < > 166   NEUTROS PCT %  --   --  93*   LYMPHS PCT %  --   --  5*   MONOS PCT %  --   --  1*   EOS PCT %  --   --  0    < > = values in this interval not displayed  Results from last 7 days   Lab Units 07/25/22  0532 07/23/22  0548 07/22/22  1545   SODIUM mmol/L 145   < > 140   POTASSIUM mmol/L 3 4*   < > 3 1*   CHLORIDE mmol/L 111*   < > 107   CO2 mmol/L 26   < > 23   BUN mg/dL 8   < > 13   CREATININE mg/dL 0 67   < > 0 94   ANION GAP mmol/L 8   < > 10   CALCIUM mg/dL 7 8*   < > 8 1*   ALBUMIN g/dL  --   --  3 1*   TOTAL BILIRUBIN mg/dL  --   --  0 65   ALK PHOS U/L  --   --  50   ALT U/L  --   --  10   AST U/L  --   --  20   GLUCOSE RANDOM mg/dL 83   < > 215*    < > = values in this interval not displayed                   Results from last 7 days   Lab Units 07/23/22  0555 07/23/22  0215 07/23/22  0024 07/22/22  2213 07/22/22  1734 07/22/22  1545   LACTIC ACID mmol/L 3 3* 6 3* 6 8* 8 2*   < > 2 9*   PROCALCITONIN ng/ml  --   --   --   --   --  0 05    < > = values in this interval not displayed  Lines/Drains:  Invasive Devices  Report    Peripheral Intravenous Line  Duration           Peripheral IV 07/25/22 Right;Dorsal (posterior) Forearm 1 day          Drain  Duration           External Urinary Catheter 1 day                      Imaging: No pertinent imaging reviewed  Recent Cultures (last 7 days):         Last 24 Hours Medication List:   Current Facility-Administered Medications   Medication Dose Route Frequency Provider Last Rate    acetaminophen  650 mg Oral Q4H PRN Madan Armstrong, DO      albuterol  2 puff Inhalation Q4H PRN Madan Armstrong, DO      albuterol  2 5 mg Nebulization Q6H PRN Corinne Ramirez MD      cefTRIAXone  1,000 mg Intravenous Q24H Chey Shelby DO 1,000 mg (07/26/22 2026)    dextrose 5 % and sodium chloride 0 9 %  50 mL/hr Intravenous Continuous MARIO Perea      fluticasone-vilanterol  1 puff Inhalation Daily Madan Armstrong, DO      gabapentin  100 mg Oral TID Madan Armstrong, DO      haloperidol lactate  1 mg Intramuscular Q6H PRN Corinne Ramirez MD      heparin (porcine)  5,000 Units Subcutaneous Q8H [address] Chey Shelby,       ondansetron  4 mg Intravenous Q6H PRN Madan Armstrong, DO      QUEtiapine  12 5 mg Oral HS Emy Cui MD      traMADol  50 mg Oral Q8H PRN Corinne Ramirez MD          Today, Patient Was Seen By: MARIO Perea    **Please Note: This note may have been constructed using a voice recognition system  **

## 2022-07-27 NOTE — PLAN OF CARE
Problem: SAFETY ADULT  Goal: Patient will remain free of falls  Description: INTERVENTIONS:  - Educate patient/family on patient safety including physical limitations  - Instruct patient to call for assistance with activity   - Consult OT/PT to assist with strengthening/mobility   - Keep Call bell within reach  - Keep bed low and locked with side rails adjusted as appropriate  - Keep care items and personal belongings within reach  - Initiate and maintain comfort rounds  - Make Fall Risk Sign visible to staff  - Apply yellow socks and bracelet for high fall risk patients  - Consider moving patient to room near nurses station  Outcome: Progressing     Problem: DISCHARGE PLANNING  Goal: Discharge to home or other facility with appropriate resources  Description: INTERVENTIONS:  - Identify barriers to discharge w/patient and caregiver  - Arrange for needed discharge resources and transportation as appropriate  - Identify discharge learning needs (meds, wound care, etc )  - Arrange for interpretive services to assist at discharge as needed  - Refer to Case Management Department for coordinating discharge planning if the patient needs post-hospital services based on physician/advanced practitioner order or complex needs related to functional status, cognitive ability, or social support system  Outcome: Progressing

## 2022-07-27 NOTE — ASSESSMENT & PLAN NOTE
Palliative was consulted 7/25  · Continue minimally invasive medical care until discharge  · Daughter/[facility] Yessica Chakraborty has decided to proceed with hospice consult  · Guerline Zuñiga wants patient back home for hospice  · Hospice referral placed and liaison updated     · Patient is to be discharged to Home with Home Hospice on (7/28)

## 2022-07-28 NOTE — DISCHARGE SUMMARY
[place] 45  Discharge- Laury Carnes 7/4/1927, 80 y o  female MRN: [id number]  Unit/Bed#: -01 Encounter: [id number]  Primary Care Provider: David Lu MD   Date and time admitted to hospital: 7/22/2022  3:28 PM    Goals of care, counseling/discussion  Assessment & Plan  Palliative was consulted 7/25  · Continue minimally invasive medical care until discharge  · Daughter/[facility] Mary Galdamez has decided to proceed with hospice consult  · Alesia Joyner wants patient back home for hospice  · Hospice referral placed and liaison updated  · Patient is to be discharged to Home with Home Hospice today (7/28)      * Encephalopathy  Assessment & Plan  · Delirium with underlying dementia  · C/w Virtual 1:1 while admitted    Aspiration into airway  Assessment & Plan  · Pt had episode of aspiration while admitted  · C/w dysphagia I and necator thick liquids  · Aspiration Precaution  · Completed 6 days of IV Rocephin      Medical Problems             Resolved Problems  Date Reviewed: 7/28/2022   None               Discharging Physician / Practitioner: MARIO Esteban  PCP: David Lu MD  Admission Date:   Admission Orders (From admission, onward)     Ordered        07/23/22 1317  Inpatient Admission  Once            07/22/22 2116  Place in Observation  Once                      Discharge Date: 07/28/22    Consultations During Hospital Stay:  · Speech Therapy  · Palliative Medicine  · Hospice    Procedures Performed:   · None    Significant Findings / Test Results:     XR chest portable   Final Result by Kimmy Brown MD (07/24 1230)      No acute cardiopulmonary disease  Workstation performed: [id number]         XR chest 1 view portable   ED Interpretation by Nadine Raymond MD (07/22 1702)   Left pleural effusion, no focal consolidation      Final Result by Isabella Bush DO (07/23 0122)      No acute cardiopulmonary disease                    Workstation performed: [id number]             Incidental Findings:   · None    Test Results Pending at Discharge (will require follow up): · None     Outpatient Tests Requested:  · None    Complications:  None    Reason for Admission: Syncopal Episode    Hospital Course:   Marisela Dietrich is a 80 y o  female patient with a PMH of  hypertension, stage IIIA CKD, asthma, dementia who originally presented to the hospital on 7/22/2022 due to syncopal episode  Patient originally stated that she was out in the heat earlier today when she began to feel lightheaded  She also stated that she felt short of breath  Her daughter called EMS  She was found be saturating at 85% on room air and was treated with nebulizers in the ED which resulted in improvement of her respiratory status  the ED staff was attempted to discharge the patient however patient's lactic acidosis slowly worsened from 2 5-6 8  Unclear etiology as there are no signs of active infection or hypotension  She received IV fluid hydration in the ED without improvement of her lactic acidosis    During admission, patients mentation worsened  Patient began experiencing episodes of confusion and delirium likely in the setting of undiagnosed dementia  Patient began having difficulties with swallowing  Speech was consulted  Patient experienced an episode of aspiration while admitted  Patient completed 6 days of Antibiotic therapy  Mentation with minimal improvement  Palliative medicine was eventually consulted  Family wished to pursue a more comfort approach  Patient will be discharged home with Home hospice  Please see above list of diagnoses and related plan for additional information  Condition at Discharge: poor    Discharge Day Visit / Exam:   Subjective:  Patient sleeping, resting comfortable  Does not appear in acute distress      Vitals: Blood Pressure: 154/76 (07/27/22 2001)  Pulse: 79 (07/27/22 2001)  Temperature: 98 7 °F (37 1 °C) (07/27/22 2001)  Temp Source: Oral (07/27/22 2001)  Respirations: 18 (07/27/22 2001)  Height: 5' (152 4 cm) (07/22/22 2300)  Weight - Scale: 58 9 kg (129 lb 13 6 oz) (07/27/22 0500)  SpO2: 92 % (07/27/22 2001)  Exam:   Physical Exam  Vitals and nursing note reviewed  Constitutional:       General: She is sleeping  She is not in acute distress  HENT:      Head: Normocephalic  Nose: Nose normal  No congestion  Mouth/Throat:      Mouth: Mucous membranes are dry  Pharynx: Oropharynx is clear  Cardiovascular:      Rate and Rhythm: Normal rate  Pulses: Normal pulses  Heart sounds: Murmur heard  Pulmonary:      Effort: Pulmonary effort is normal  No respiratory distress  Abdominal:      General: Bowel sounds are normal       Palpations: Abdomen is soft  Tenderness: There is abdominal tenderness  Musculoskeletal:         General: Normal range of motion  Cervical back: Normal range of motion  Right lower leg: No edema  Left lower leg: No edema  Skin:     Capillary Refill: Capillary refill takes less than 2 seconds  Coloration: Skin is pale  Neurological:      Motor: Weakness (Generalized) present  Psychiatric:         Speech: Speech normal          Behavior: Behavior is cooperative  Cognition and Memory: Memory is impaired  Judgment: Judgment is impulsive  Discussion with Family: Updated  (daughter) via phone  Discharge instructions/Information to patient and family:   See after visit summary for information provided to patient and family  Provisions for Follow-Up Care:  See after visit summary for information related to follow-up care and any pertinent home health orders  Disposition:   Other: Home with Home Hospice    Planned Readmission: No     Discharge Statement:  I spent 60 minutes discharging the patient  This time was spent on the day of discharge  I had direct contact with the patient on the day of discharge   Greater than 50% of the total time was spent examining patient, answering all patient questions, arranging and discussing plan of care with patient as well as directly providing post-discharge instructions  Additional time then spent on discharge activities  Discharge Medications:  See after visit summary for reconciled discharge medications provided to patient and/or family        **Please Note: This note may have been constructed using a voice recognition system**

## 2022-07-28 NOTE — PLAN OF CARE
Problem: MOBILITY - ADULT  Goal: Maintain or return to baseline ADL function  Description: INTERVENTIONS:  -  Assess patient's ability to carry out ADLs; assess patient's baseline for ADL function and identify physical deficits which impact ability to perform ADLs (bathing, care of mouth/teeth, toileting, grooming, dressing, etc )  - Assess/evaluate cause of self-care deficits   - Assess range of motion  - Assess patient's mobility; develop plan if impaired  - Assess patient's need for assistive devices and provide as appropriate  - Encourage maximum independence but intervene and supervise when necessary  - Involve family in performance of ADLs  - Assess for home care needs following discharge   - Consider OT consult to assist with ADL evaluation and planning for discharge  - Provide patient education as appropriate  Outcome: Progressing     Problem: Prexisting or High Potential for Compromised Skin Integrity  Goal: Skin integrity is maintained or improved  Description: INTERVENTIONS:  - Identify patients at risk for skin breakdown  - Assess and monitor skin integrity  - Assess and monitor nutrition and hydration status  - Monitor labs   - Assess for incontinence   - Turn and reposition patient  - Assist with mobility/ambulation  - Relieve pressure over bony prominences  - Avoid friction and shearing  - Provide appropriate hygiene as needed including keeping skin clean and dry  - Evaluate need for skin moisturizer/barrier cream  - Collaborate with interdisciplinary team   - Patient/family teaching  - Consider wound care consult   Outcome: Progressing     Problem: PAIN - ADULT  Goal: Verbalizes/displays adequate comfort level or baseline comfort level  Description: Interventions:  - Encourage patient to monitor pain and request assistance  - Assess pain using appropriate pain scale  - Administer analgesics based on type and severity of pain and evaluate response  - Implement non-pharmacological measures as appropriate and evaluate response  - Consider cultural and social influences on pain and pain management  - Notify physician/advanced practitioner if interventions unsuccessful or patient reports new pain  Outcome: Progressing     Problem: INFECTION - ADULT  Goal: Absence or prevention of progression during hospitalization  Description: INTERVENTIONS:  - Assess and monitor for signs and symptoms of infection  - Monitor lab/diagnostic results  - Monitor all insertion sites, i e  indwelling lines, tubes, and drains  - Monitor endotracheal if appropriate and nasal secretions for changes in amount and color  - [place] appropriate cooling/warming therapies per order  - Administer medications as ordered  - Instruct and encourage patient and family to use good hand hygiene technique  - Identify and instruct in appropriate isolation precautions for identified infection/condition  Outcome: Progressing     Problem: SAFETY ADULT  Goal: Maintain or return to baseline ADL function  Description: INTERVENTIONS:  -  Assess patient's ability to carry out ADLs; assess patient's baseline for ADL function and identify physical deficits which impact ability to perform ADLs (bathing, care of mouth/teeth, toileting, grooming, dressing, etc )  - Assess/evaluate cause of self-care deficits   - Assess range of motion  - Assess patient's mobility; develop plan if impaired  - Assess patient's need for assistive devices and provide as appropriate  - Encourage maximum independence but intervene and supervise when necessary  - Involve family in performance of ADLs  - Assess for home care needs following discharge   - Consider OT consult to assist with ADL evaluation and planning for discharge  - Provide patient education as appropriate  Outcome: Progressing     Problem: DISCHARGE PLANNING  Goal: Discharge to home or other facility with appropriate resources  Description: INTERVENTIONS:  - Identify barriers to discharge w/patient and caregiver  - Arrange for needed discharge resources and transportation as appropriate  - Identify discharge learning needs (meds, wound care, etc )  - Arrange for interpretive services to assist at discharge as needed  - Refer to Case Management Department for coordinating discharge planning if the patient needs post-hospital services based on physician/advanced practitioner order or complex needs related to functional status, cognitive ability, or social support system  Outcome: Progressing     Problem: Knowledge Deficit  Goal: Patient/family/caregiver demonstrates understanding of disease process, treatment plan, medications, and discharge instructions  Description: Complete learning assessment and assess knowledge base  Interventions:  - Provide teaching at level of understanding  - Provide teaching via preferred learning methods  Outcome: Progressing     Problem: Potential for Falls  Goal: Patient will remain free of falls  Description: INTERVENTIONS:  - Educate patient/family on patient safety including physical limitations  - Instruct patient to call for assistance with activity   - Consult OT/PT to assist with strengthening/mobility   - Keep Call bell within reach  - Keep bed low and locked with side rails adjusted as appropriate  - Keep care items and personal belongings within reach  - Initiate and maintain comfort rounds  - Make Fall Risk Sign visible to staff  - Offer Toileting every 3 Hours, in advance of need  - Initiate/Maintain bedalarm  - Obtain necessary fall risk management equipment:   - Apply yellow socks and bracelet for high fall risk patients  - Consider moving patient to room near nurses station  Outcome: Progressing     Problem: Nutrition/Hydration-ADULT  Goal: Nutrient/Hydration intake appropriate for improving, restoring or maintaining nutritional needs  Description: Monitor and assess patient's nutrition/hydration status for malnutrition   Collaborate with interdisciplinary team and initiate plan and interventions as ordered  Monitor patient's weight and dietary intake as ordered or per policy  Utilize nutrition screening tool and intervene as necessary  Determine patient's food preferences and provide high-protein, high-caloric foods as appropriate       INTERVENTIONS:  - Monitor oral intake, urinary output, labs, and treatment plans  - Assess nutrition and hydration status and recommend course of action  - Evaluate amount of meals eaten  - Assist patient with eating if necessary   - Allow adequate time for meals  - Recommend/ encourage appropriate diets, oral nutritional supplements, and vitamin/mineral supplements  - Order, calculate, and assess calorie counts as needed  - Recommend, monitor, and adjust tube feedings and TPN/PPN based on assessed needs  - Assess need for intravenous fluids  - Provide specific nutrition/hydration education as appropriate  - Include patient/family/caregiver in decisions related to nutrition  Outcome: Progressing

## 2022-07-28 NOTE — CASE MANAGEMENT
Case Management Discharge Planning Note    Patient name Corinne Mason  Location /-30 MRN [id number]  : 1927 Date 2022       Current Admission Date: 2022  Current Admission Diagnosis:Encephalopathy   Patient Active Problem List    Diagnosis Date Noted    Goals of care, counseling/discussion 2022    Aspiration into airway 2022    Encephalopathy 2022    SIRS (systemic inflammatory response syndrome) ([facility] ) 2022    Hypokalemia 2022    Lactic acidosis 2022    Elevated troponin 2022    Alzheimer's disease, early onset ([facility] ) 2021    Continuous opioid dependence ([facility] ) 2021    Stage 3a chronic kidney disease ([facility] ) 2021    Depression 2017    General weakness 2017    UTI (urinary tract infection) 2017    Neuropathy 2017    Gout 2017    Essential hypertension 2017    Asthma 2017      LOS (days): 5  Geometric Mean LOS (GMLOS) (days): 2 70  Days to GMLOS:-2 2     OBJECTIVE:  Risk of Unplanned Readmission Score: 25 23         Current admission status: Inpatient   Preferred Pharmacy:   [facility], [address] [address]  Phone: [phone] Fax: [phone]    Primary Care Provider: King Cano MD    Primary Insurance: [facility]  Secondary Insurance:     DISCHARGE DETAILS:                                          Other Referral/Resources/Interventions Provided:  Interventions: Transportation  Referral Comments: Transportation referral placed to [facility] via ecin - transport time confirmed for 1330 via [facility] to home  All parties notified of same  hospice liaison, estelle petty dtr of transport time to home today  Transport at Discharge : [facility]        Transported by [employer] and Unit #):  [facility]  ETA of Transport (Date): 22  ETA of Transport (Time): 1330

## 2022-07-28 NOTE — PLAN OF CARE
Problem: MOBILITY - ADULT  Goal: Maintain or return to baseline ADL function  Description: INTERVENTIONS:  -  Assess patient's ability to carry out ADLs; assess patient's baseline for ADL function and identify physical deficits which impact ability to perform ADLs (bathing, care of mouth/teeth, toileting, grooming, dressing, etc )  - Assess/evaluate cause of self-care deficits   - Assess range of motion  - Assess patient's mobility; develop plan if impaired  - Assess patient's need for assistive devices and provide as appropriate  - Encourage maximum independence but intervene and supervise when necessary  - Involve family in performance of ADLs  - Assess for home care needs following discharge   - Consider OT consult to assist with ADL evaluation and planning for discharge  - Provide patient education as appropriate  Outcome: Progressing  Goal: Maintains/Returns to pre admission functional level  Description: INTERVENTIONS:  - Perform BMAT or MOVE assessment daily    - Set and communicate daily mobility goal to care team and patient/family/caregiver  - Collaborate with rehabilitation services on mobility goals if consulted  - Perform Range of Motion 2 times a day  - Reposition patient every 2 hours    - Dangle patient 2 times a day  - Stand patient 2 times a day  - Ambulate patient 2 times a day  - Out of bed to chair 2 times a day   - Out of bed for meals 2 times a day  - Out of bed for toileting  - Record patient progress and toleration of activity level   Outcome: Progressing     Problem: Prexisting or High Potential for Compromised Skin Integrity  Goal: Skin integrity is maintained or improved  Description: INTERVENTIONS:  - Identify patients at risk for skin breakdown  - Assess and monitor skin integrity  - Assess and monitor nutrition and hydration status  - Monitor labs   - Assess for incontinence   - Turn and reposition patient  - Assist with mobility/ambulation  - Relieve pressure over bony prominences  - Avoid friction and shearing  - Provide appropriate hygiene as needed including keeping skin clean and dry  - Evaluate need for skin moisturizer/barrier cream  - Collaborate with interdisciplinary team   - Patient/family teaching  - Consider wound care consult   Outcome: Progressing     Problem: PAIN - ADULT  Goal: Verbalizes/displays adequate comfort level or baseline comfort level  Description: Interventions:  - Encourage patient to monitor pain and request assistance  - Assess pain using appropriate pain scale  - Administer analgesics based on type and severity of pain and evaluate response  - Implement non-pharmacological measures as appropriate and evaluate response  - Consider cultural and social influences on pain and pain management  - Notify physician/advanced practitioner if interventions unsuccessful or patient reports new pain  Outcome: Progressing     Problem: INFECTION - ADULT  Goal: Absence or prevention of progression during hospitalization  Description: INTERVENTIONS:  - Assess and monitor for signs and symptoms of infection  - Monitor lab/diagnostic results  - Monitor all insertion sites, i e  indwelling lines, tubes, and drains  - Monitor endotracheal if appropriate and nasal secretions for changes in amount and color  - [place] appropriate cooling/warming therapies per order  - Administer medications as ordered  - Instruct and encourage patient and family to use good hand hygiene technique  - Identify and instruct in appropriate isolation precautions for identified infection/condition  Outcome: Progressing  Goal: Absence of fever/infection during neutropenic period  Description: INTERVENTIONS:  - Monitor WBC    Outcome: Progressing     Problem: SAFETY ADULT  Goal: Maintain or return to baseline ADL function  Description: INTERVENTIONS:  -  Assess patient's ability to carry out ADLs; assess patient's baseline for ADL function and identify physical deficits which impact ability to perform ADLs (bathing, care of mouth/teeth, toileting, grooming, dressing, etc )  - Assess/evaluate cause of self-care deficits   - Assess range of motion  - Assess patient's mobility; develop plan if impaired  - Assess patient's need for assistive devices and provide as appropriate  - Encourage maximum independence but intervene and supervise when necessary  - Involve family in performance of ADLs  - Assess for home care needs following discharge   - Consider OT consult to assist with ADL evaluation and planning for discharge  - Provide patient education as appropriate  Outcome: Progressing  Goal: Maintains/Returns to pre admission functional level  Description: INTERVENTIONS:  - Perform BMAT or MOVE assessment daily    - Set and communicate daily mobility goal to care team and patient/family/caregiver  - Collaborate with rehabilitation services on mobility goals if consulted  - Perform Range of Motion 2 times a day  - Reposition patient every 2 hours    - Dangle patient 2 times a day  - Stand patient 2 times a day  - Ambulate patient 2 times a day  - Out of bed to chair 2 times a day   - Out of bed for meals 2 times a day  - Out of bed for toileting  - Record patient progress and toleration of activity level   Outcome: Progressing  Goal: Patient will remain free of falls  Description: INTERVENTIONS:  - Educate patient/family on patient safety including physical limitations  - Instruct patient to call for assistance with activity   - Consult OT/PT to assist with strengthening/mobility   - Keep Call bell within reach  - Keep bed low and locked with side rails adjusted as appropriate  - Keep care items and personal belongings within reach  - Initiate and maintain comfort rounds  - Make Fall Risk Sign visible to staff  - Offer Toileting every 2 Hours, in advance of need  - Initiate/Maintain alarm  - Obtain necessary fall risk management equipment  - Apply yellow socks and bracelet for high fall risk patients  - Consider moving patient to room near nurses station  Outcome: Progressing     Problem: DISCHARGE PLANNING  Goal: Discharge to home or other facility with appropriate resources  Description: INTERVENTIONS:  - Identify barriers to discharge w/patient and caregiver  - Arrange for needed discharge resources and transportation as appropriate  - Identify discharge learning needs (meds, wound care, etc )  - Arrange for interpretive services to assist at discharge as needed  - Refer to Case Management Department for coordinating discharge planning if the patient needs post-hospital services based on physician/advanced practitioner order or complex needs related to functional status, cognitive ability, or social support system  Outcome: Progressing     Problem: Knowledge Deficit  Goal: Patient/family/caregiver demonstrates understanding of disease process, treatment plan, medications, and discharge instructions  Description: Complete learning assessment and assess knowledge base    Interventions:  - Provide teaching at level of understanding  - Provide teaching via preferred learning methods  Outcome: Progressing     Problem: Potential for Falls  Goal: Patient will remain free of falls  Description: INTERVENTIONS:  - Educate patient/family on patient safety including physical limitations  - Instruct patient to call for assistance with activity   - Consult OT/PT to assist with strengthening/mobility   - Keep Call bell within reach  - Keep bed low and locked with side rails adjusted as appropriate  - Keep care items and personal belongings within reach  - Initiate and maintain comfort rounds  - Make Fall Risk Sign visible to staff  - Offer Toileting every 2 Hours, in advance of need  - Initiate/Maintain alarm  - Obtain necessary fall risk management equipment  - Apply yellow socks and bracelet for high fall risk patients  - Consider moving patient to room near nurses station  Outcome: Progressing     Problem: Nutrition/Hydration-ADULT  Goal: Nutrient/Hydration intake appropriate for improving, restoring or maintaining nutritional needs  Description: Monitor and assess patient's nutrition/hydration status for malnutrition  Collaborate with interdisciplinary team and initiate plan and interventions as ordered  Monitor patient's weight and dietary intake as ordered or per policy  Utilize nutrition screening tool and intervene as necessary  Determine patient's food preferences and provide high-protein, high-caloric foods as appropriate       INTERVENTIONS:  - Monitor oral intake, urinary output, labs, and treatment plans  - Assess nutrition and hydration status and recommend course of action  - Evaluate amount of meals eaten  - Assist patient with eating if necessary   - Allow adequate time for meals  - Recommend/ encourage appropriate diets, oral nutritional supplements, and vitamin/mineral supplements  - Order, calculate, and assess calorie counts as needed  - Recommend, monitor, and adjust tube feedings and TPN/PPN based on assessed needs  - Assess need for intravenous fluids  - Provide specific nutrition/hydration education as appropriate  - Include patient/family/caregiver in decisions related to nutrition  Outcome: Progressing

## 2022-07-28 NOTE — ASSESSMENT & PLAN NOTE
· Pt had episode of aspiration while admitted  · C/w dysphagia I and necator thick liquids  · Aspiration Precaution  · Completed 6 days of IV Rocephin

## 2022-07-28 NOTE — ASSESSMENT & PLAN NOTE
Palliative was consulted 7/25  · Continue minimally invasive medical care until discharge  · Daughter/[facility] Wade Ortiz has decided to proceed with hospice consult  · Lauro Rosa wants patient back home for hospice  · Hospice referral placed and liaison updated     · Patient is to be discharged to Home with [facility] today (7/28)

## 2022-07-29 NOTE — UTILIZATION REVIEW
Notification of Discharge   This is a Notification of Discharge from our facility [address]  Please be advised that this patient has been discharge from our facility  Below you will find the admission and discharge date and time including the patients disposition  UTILIZATION REVIEW CONTACT:  JOSIAH Murdock  Utilization   Network Utilization Review Department  Phone: [phone] x carefully listen to the prompts  All voicemails are confidential   Email: [email or web address]     PHYSICIAN ADVISORY SERVICES:  FOR [id number] REVIEW - MEDICAL NECESSITY DENIAL  Phone: [phone]  Fax: [phone]  Email: [email or web address]     PRESENTATION DATE: 7/22/2022  3:28 PM  OBERVATION ADMISSION DATE:   INPATIENT ADMISSION DATE: 7/23/22  1:17 PM   DISCHARGE DATE: 7/28/2022  2:13 PM  DISPOSITION: Home with [place] with [address] INFORMATION:  Send all requests for admission clinical reviews, approved or denied determinations and any other requests to dedicated fax number below belonging to the campus where the patient is receiving treatment   List of dedicated fax numbers:  [address] [place] [address] DENIALS (Administrative/Medical Necessity) [phone]   [address] [place] [address] (Maternity/NICU/Pediatrics) [phone]   Lupe Fines [phone]   [address] [facility] [phone]   [address] [place] [address] [phone]   [address] [place] [address] [address] [place] [address],[address] Floor [address] [phone]   [facility]  [phone]   [address] [place] [address], [facility]  [address] [facility] [address] [facility] [phone]

## 2022-07-31 NOTE — CASE COMMUNICATION
TC returning daughter Portia's phone call to main Shanique pitt reports morphine 5 mg and loraz 0 5 mg given as ordered and not effective and it has not been 2 hours and pt is moaning and restless/uncomfortable  TC to [address] [place] [address], orders to increase morphine to 10 mg and lorazepam 1 mg, both q 8 and q 2 prn   TC to Shanique and reviewed new orders and Leeta Ship understanding and she will call back if new dosing is not effect janet

## 2022-07-31 NOTE — CASE COMMUNICATION
Ship to Pt   Home   Branch: [facility]     Tab Briefs    Large [id number]    12/pk  2                    Bed wedge [id number]  [id number]

## 2022-08-07 ENCOUNTER — HOME CARE VISIT (OUTPATIENT)
Dept: HOME HOSPICE | Facility: HOSPICE | Age: 87
End: 2022-08-07
Payer: MEDICARE

## 2022-08-11 ENCOUNTER — HOME CARE VISIT (OUTPATIENT)
Dept: HOME HOSPICE | Facility: HOSPICE | Age: 87
End: 2022-08-11
Payer: MEDICARE

## 2022-08-19 ENCOUNTER — HOME CARE VISIT (OUTPATIENT)
Dept: HOME HOSPICE | Facility: HOSPICE | Age: 87
End: 2022-08-19
Payer: MEDICARE

## 2022-08-19 NOTE — HOSPICE
Junaidletitia Angel, Bereavement Final IDG [id number] (1MR, 1LR) Due: 22  : 1927  SOC: 22  DOD: 22  Diagnosis: Dementia, CKD & COVID  Primary: Daughter - Tyra Velarde was a 80year old  woman on hospice at her daughter Portia's  Children: Irvin Diane, and Krish Navarrete were present at [address] open  There are 6 children  Two not involved  Angela Valera was one of sixteen children and grew up on a farm  Worked in [address] [place] [address]  Christine Mcdowell would come over often to support Keenan Masters stated her brothers have not been very present in their mother's life over the past few years  Keenansaran Marleynco has shoulder and leg issues  She lost 3 out of 4 of her husbands, one on hospice  "I have been around so much death I think God put me here to be a caretaker " Christine Mcdowell lost her  on hospice, as well  She is a retired CNA  Angela Valera was [employer], she was affiliated with [address] [facility] and [facility]  Angela Valera was described as a "spitfire" and "tough bird " [facility], and  Tayo refused to provide contact information for bereavement supports  Only Keenan Masters and Christine Mcdowell to be followed  TOD: Daughters were at bedside  By the end of RN visit 2 sons and son-in-law arrived as well  All very supportive of one another  CC: SABINE Patterson called Keenan Masters to offer condolences  Keenan Masters tearfully stated "I got disowned"  She said her siblings are upset they are not getting money, thinking there was a will leaving them thousands of dollars, when there was not  Christine Mdcowell reportedly called Keenan Masters grejodiy after taking out a life insurance policy on their mother  It was stated, Christine Mcdowell assisted in care but her brothers did not, only visiting to bring lunch on Sundays  Keenan Masters voiced being upset their mother lived with her and she provided the care, but they expect money  She said she didn't like her brothers anyway and is okay not being in contact with them   Voiced good support from a friend and cousin and that she would be staying away from a lot of family at the   Raghavendra Kim stated she can cope with death, but it is hard being more distanced from her family  Call Assignments:  Teresita Gonzalez to reassess daughter Lelo Mulligan at   (Due: 22)  ZENAIDA Tamez to reassess daughter Chasity Askew at [facility]  Please request her address for bereavement follow-up, as able   (Due: 22)

## 2024-05-28 NOTE — TELEPHONE ENCOUNTER
Patient's daughter called and stated her mom was complaining of burning again while she peed  She was previously in the hospital two weeks ago for a UTI and was requesting maybe a stronger antibiotic?  Please advise Urine culture negative
